# Patient Record
Sex: MALE | Race: WHITE | NOT HISPANIC OR LATINO | Employment: FULL TIME | URBAN - METROPOLITAN AREA
[De-identification: names, ages, dates, MRNs, and addresses within clinical notes are randomized per-mention and may not be internally consistent; named-entity substitution may affect disease eponyms.]

---

## 2017-03-29 ENCOUNTER — GENERIC CONVERSION - ENCOUNTER (OUTPATIENT)
Dept: OTHER | Facility: OTHER | Age: 52
End: 2017-03-29

## 2017-04-08 ENCOUNTER — TRANSCRIBE ORDERS (OUTPATIENT)
Dept: ADMINISTRATIVE | Facility: HOSPITAL | Age: 52
End: 2017-04-08

## 2017-04-08 ENCOUNTER — APPOINTMENT (OUTPATIENT)
Dept: LAB | Facility: MEDICAL CENTER | Age: 52
End: 2017-04-08
Payer: COMMERCIAL

## 2017-04-08 DIAGNOSIS — E78.00 PURE HYPERCHOLESTEROLEMIA: ICD-10-CM

## 2017-04-08 DIAGNOSIS — E03.9 HYPOTHYROIDISM: ICD-10-CM

## 2017-04-08 LAB
ALBUMIN SERPL BCP-MCNC: 3.7 G/DL (ref 3.5–5)
ALP SERPL-CCNC: 72 U/L (ref 46–116)
ALT SERPL W P-5'-P-CCNC: 23 U/L (ref 12–78)
AST SERPL W P-5'-P-CCNC: 13 U/L (ref 5–45)
BILIRUB DIRECT SERPL-MCNC: 0.09 MG/DL (ref 0–0.2)
BILIRUB SERPL-MCNC: 0.41 MG/DL (ref 0.2–1)
CHOLEST SERPL-MCNC: 151 MG/DL (ref 50–200)
CK SERPL-CCNC: 98 U/L (ref 39–308)
HDLC SERPL-MCNC: 47 MG/DL (ref 40–60)
LDLC SERPL CALC-MCNC: 82 MG/DL (ref 0–100)
PROT SERPL-MCNC: 7.1 G/DL (ref 6.4–8.2)
TRIGL SERPL-MCNC: 109 MG/DL
TSH SERPL DL<=0.05 MIU/L-ACNC: 3.84 UIU/ML (ref 0.36–3.74)

## 2017-04-08 PROCEDURE — 84443 ASSAY THYROID STIM HORMONE: CPT

## 2017-04-08 PROCEDURE — 82550 ASSAY OF CK (CPK): CPT

## 2017-04-08 PROCEDURE — 80061 LIPID PANEL: CPT

## 2017-04-08 PROCEDURE — 80076 HEPATIC FUNCTION PANEL: CPT

## 2017-04-08 PROCEDURE — 36415 COLL VENOUS BLD VENIPUNCTURE: CPT

## 2017-04-11 ENCOUNTER — ALLSCRIPTS OFFICE VISIT (OUTPATIENT)
Dept: OTHER | Facility: OTHER | Age: 52
End: 2017-04-11

## 2017-04-11 DIAGNOSIS — E78.00 PURE HYPERCHOLESTEROLEMIA: ICD-10-CM

## 2017-04-11 DIAGNOSIS — E03.9 HYPOTHYROIDISM: ICD-10-CM

## 2017-07-03 ENCOUNTER — APPOINTMENT (EMERGENCY)
Dept: RADIOLOGY | Facility: HOSPITAL | Age: 52
End: 2017-07-03
Payer: COMMERCIAL

## 2017-07-03 ENCOUNTER — GENERIC CONVERSION - ENCOUNTER (OUTPATIENT)
Dept: OTHER | Facility: OTHER | Age: 52
End: 2017-07-03

## 2017-07-03 ENCOUNTER — ANESTHESIA EVENT (OUTPATIENT)
Dept: PERIOP | Facility: HOSPITAL | Age: 52
End: 2017-07-03

## 2017-07-03 ENCOUNTER — HOSPITAL ENCOUNTER (OUTPATIENT)
Facility: HOSPITAL | Age: 52
Setting detail: OBSERVATION
Discharge: HOME | End: 2017-07-04
Attending: EMERGENCY MEDICINE | Admitting: INTERNAL MEDICINE
Payer: COMMERCIAL

## 2017-07-03 DIAGNOSIS — K08.89 DENTALGIA: ICD-10-CM

## 2017-07-03 DIAGNOSIS — K02.9 DENTAL CARIES: ICD-10-CM

## 2017-07-03 DIAGNOSIS — K04.7 DENTAL ABSCESS: ICD-10-CM

## 2017-07-03 DIAGNOSIS — L03.211 FACIAL CELLULITIS: Primary | ICD-10-CM

## 2017-07-03 PROBLEM — J45.909 ASTHMA: Chronic | Status: ACTIVE | Noted: 2017-07-03

## 2017-07-03 PROBLEM — I10 BENIGN ESSENTIAL HTN: Chronic | Status: ACTIVE | Noted: 2017-07-03

## 2017-07-03 PROBLEM — M17.0 PRIMARY OSTEOARTHRITIS OF BOTH KNEES: Chronic | Status: ACTIVE | Noted: 2017-07-03

## 2017-07-03 PROBLEM — E78.5 HLD (HYPERLIPIDEMIA): Chronic | Status: ACTIVE | Noted: 2017-07-03

## 2017-07-03 PROBLEM — E03.9 HYPOTHYROID: Chronic | Status: ACTIVE | Noted: 2017-07-03

## 2017-07-03 LAB
ANION GAP SERPL CALCULATED.3IONS-SCNC: 6 MMOL/L (ref 4–13)
BASOPHILS # BLD AUTO: 0.03 THOUSANDS/ΜL (ref 0–0.1)
BASOPHILS NFR BLD AUTO: 0 % (ref 0–1)
BUN SERPL-MCNC: 10 MG/DL (ref 5–25)
CALCIUM SERPL-MCNC: 8.8 MG/DL (ref 8.3–10.1)
CHLORIDE SERPL-SCNC: 100 MMOL/L (ref 100–108)
CO2 SERPL-SCNC: 32 MMOL/L (ref 21–32)
CREAT SERPL-MCNC: 0.76 MG/DL (ref 0.6–1.3)
EOSINOPHIL # BLD AUTO: 0.29 THOUSAND/ΜL (ref 0–0.61)
EOSINOPHIL NFR BLD AUTO: 3 % (ref 0–6)
ERYTHROCYTE [DISTWIDTH] IN BLOOD BY AUTOMATED COUNT: 15.1 % (ref 11.6–15.1)
GFR SERPL CREATININE-BSD FRML MDRD: >60 ML/MIN/1.73SQ M
GLUCOSE SERPL-MCNC: 116 MG/DL (ref 65–140)
HCT VFR BLD AUTO: 46.3 % (ref 36.5–49.3)
HGB BLD-MCNC: 15.5 G/DL (ref 12–17)
LYMPHOCYTES # BLD AUTO: 1.37 THOUSANDS/ΜL (ref 0.6–4.47)
LYMPHOCYTES NFR BLD AUTO: 14 % (ref 14–44)
MCH RBC QN AUTO: 29 PG (ref 26.8–34.3)
MCHC RBC AUTO-ENTMCNC: 33.5 G/DL (ref 31.4–37.4)
MCV RBC AUTO: 87 FL (ref 82–98)
MONOCYTES # BLD AUTO: 0.89 THOUSAND/ΜL (ref 0.17–1.22)
MONOCYTES NFR BLD AUTO: 9 % (ref 4–12)
NEUTROPHILS # BLD AUTO: 7.35 THOUSANDS/ΜL (ref 1.85–7.62)
NEUTS SEG NFR BLD AUTO: 74 % (ref 43–75)
NRBC BLD AUTO-RTO: 0 /100 WBCS
PLATELET # BLD AUTO: 157 THOUSANDS/UL (ref 149–390)
PMV BLD AUTO: 10.6 FL (ref 8.9–12.7)
POTASSIUM SERPL-SCNC: 3.8 MMOL/L (ref 3.5–5.3)
RBC # BLD AUTO: 5.34 MILLION/UL (ref 3.88–5.62)
SODIUM SERPL-SCNC: 138 MMOL/L (ref 136–145)
WBC # BLD AUTO: 9.96 THOUSAND/UL (ref 4.31–10.16)

## 2017-07-03 PROCEDURE — 94760 N-INVAS EAR/PLS OXIMETRY 1: CPT

## 2017-07-03 PROCEDURE — 36415 COLL VENOUS BLD VENIPUNCTURE: CPT | Performed by: EMERGENCY MEDICINE

## 2017-07-03 PROCEDURE — 94762 N-INVAS EAR/PLS OXIMTRY CONT: CPT

## 2017-07-03 PROCEDURE — 96365 THER/PROPH/DIAG IV INF INIT: CPT

## 2017-07-03 PROCEDURE — 85025 COMPLETE CBC W/AUTO DIFF WBC: CPT | Performed by: EMERGENCY MEDICINE

## 2017-07-03 PROCEDURE — 99285 EMERGENCY DEPT VISIT HI MDM: CPT

## 2017-07-03 PROCEDURE — 70491 CT SOFT TISSUE NECK W/DYE: CPT

## 2017-07-03 PROCEDURE — 80048 BASIC METABOLIC PNL TOTAL CA: CPT | Performed by: EMERGENCY MEDICINE

## 2017-07-03 PROCEDURE — 93005 ELECTROCARDIOGRAM TRACING: CPT | Performed by: STUDENT IN AN ORGANIZED HEALTH CARE EDUCATION/TRAINING PROGRAM

## 2017-07-03 RX ORDER — FLUTICASONE PROPIONATE 220 UG/1
1 AEROSOL, METERED RESPIRATORY (INHALATION) EVERY 12 HOURS SCHEDULED
Status: DISCONTINUED | OUTPATIENT
Start: 2017-07-03 | End: 2017-07-04 | Stop reason: HOSPADM

## 2017-07-03 RX ORDER — HYDROCODONE BITARTRATE AND ACETAMINOPHEN 5; 300 MG/1; MG/1
1 TABLET ORAL EVERY 6 HOURS PRN
COMMUNITY
End: 2018-10-23 | Stop reason: ALTCHOICE

## 2017-07-03 RX ORDER — ATORVASTATIN CALCIUM 20 MG/1
20 TABLET, FILM COATED ORAL DAILY
COMMUNITY
End: 2018-05-10 | Stop reason: SDUPTHER

## 2017-07-03 RX ORDER — SODIUM CHLORIDE 9 MG/ML
125 INJECTION, SOLUTION INTRAVENOUS CONTINUOUS
Status: DISCONTINUED | OUTPATIENT
Start: 2017-07-03 | End: 2017-07-04

## 2017-07-03 RX ORDER — ATORVASTATIN CALCIUM 20 MG/1
20 TABLET, FILM COATED ORAL
Status: DISCONTINUED | OUTPATIENT
Start: 2017-07-03 | End: 2017-07-04 | Stop reason: HOSPADM

## 2017-07-03 RX ORDER — AMOXICILLIN AND CLAVULANATE POTASSIUM 875; 125 MG/1; MG/1
1 TABLET, FILM COATED ORAL EVERY 12 HOURS SCHEDULED
COMMUNITY
End: 2017-07-04 | Stop reason: HOSPADM

## 2017-07-03 RX ORDER — MORPHINE SULFATE 4 MG/ML
4 INJECTION, SOLUTION INTRAMUSCULAR; INTRAVENOUS EVERY 4 HOURS PRN
Status: DISCONTINUED | OUTPATIENT
Start: 2017-07-03 | End: 2017-07-04

## 2017-07-03 RX ORDER — CLINDAMYCIN PHOSPHATE 600 MG/50ML
600 INJECTION INTRAVENOUS EVERY 8 HOURS
Status: DISCONTINUED | OUTPATIENT
Start: 2017-07-03 | End: 2017-07-04 | Stop reason: HOSPADM

## 2017-07-03 RX ORDER — CLINDAMYCIN PHOSPHATE 600 MG/50ML
600 INJECTION INTRAVENOUS ONCE
Status: COMPLETED | OUTPATIENT
Start: 2017-07-03 | End: 2017-07-03

## 2017-07-03 RX ORDER — LOSARTAN POTASSIUM 50 MG/1
50 TABLET ORAL DAILY
COMMUNITY
End: 2018-07-25 | Stop reason: SDUPTHER

## 2017-07-03 RX ORDER — LEVOTHYROXINE SODIUM 0.2 MG/1
225 TABLET ORAL DAILY
COMMUNITY
End: 2018-06-22 | Stop reason: SDUPTHER

## 2017-07-03 RX ORDER — LOSARTAN POTASSIUM 50 MG/1
50 TABLET ORAL DAILY
Status: DISCONTINUED | OUTPATIENT
Start: 2017-07-03 | End: 2017-07-04 | Stop reason: HOSPADM

## 2017-07-03 RX ORDER — ACETAMINOPHEN 325 MG/1
650 TABLET ORAL EVERY 6 HOURS PRN
Status: DISCONTINUED | OUTPATIENT
Start: 2017-07-03 | End: 2017-07-03

## 2017-07-03 RX ORDER — IBUPROFEN 600 MG/1
600 TABLET ORAL EVERY 6 HOURS PRN
Status: DISCONTINUED | OUTPATIENT
Start: 2017-07-03 | End: 2017-07-03

## 2017-07-03 RX ADMIN — DEXAMETHASONE SODIUM PHOSPHATE 10 MG: 10 INJECTION, SOLUTION INTRAMUSCULAR; INTRAVENOUS at 22:00

## 2017-07-03 RX ADMIN — ATORVASTATIN CALCIUM 20 MG: 20 TABLET, FILM COATED ORAL at 15:41

## 2017-07-03 RX ADMIN — CLINDAMYCIN PHOSPHATE 600 MG: 12 INJECTION, SOLUTION INTRAMUSCULAR; INTRAVENOUS at 13:07

## 2017-07-03 RX ADMIN — IOHEXOL 84 ML: 350 INJECTION, SOLUTION INTRAVENOUS at 05:21

## 2017-07-03 RX ADMIN — SODIUM CHLORIDE 125 ML/HR: 0.9 INJECTION, SOLUTION INTRAVENOUS at 17:49

## 2017-07-03 RX ADMIN — LOSARTAN POTASSIUM 50 MG: 50 TABLET, FILM COATED ORAL at 09:39

## 2017-07-03 RX ADMIN — DEXAMETHASONE SODIUM PHOSPHATE 10 MG: 10 INJECTION, SOLUTION INTRAMUSCULAR; INTRAVENOUS at 07:31

## 2017-07-03 RX ADMIN — DEXAMETHASONE SODIUM PHOSPHATE 10 MG: 10 INJECTION, SOLUTION INTRAMUSCULAR; INTRAVENOUS at 15:40

## 2017-07-03 RX ADMIN — MORPHINE SULFATE 4 MG: 4 INJECTION, SOLUTION INTRAMUSCULAR; INTRAVENOUS at 07:37

## 2017-07-03 RX ADMIN — CLINDAMYCIN PHOSPHATE 600 MG: 12 INJECTION, SOLUTION INTRAMUSCULAR; INTRAVENOUS at 19:47

## 2017-07-03 RX ADMIN — SODIUM CHLORIDE 125 ML/HR: 0.9 INJECTION, SOLUTION INTRAVENOUS at 07:33

## 2017-07-03 RX ADMIN — CLINDAMYCIN PHOSPHATE 600 MG: 12 INJECTION, SOLUTION INTRAMUSCULAR; INTRAVENOUS at 05:20

## 2017-07-04 ENCOUNTER — ANESTHESIA (OUTPATIENT)
Dept: PERIOP | Facility: HOSPITAL | Age: 52
End: 2017-07-04

## 2017-07-04 VITALS
HEIGHT: 72 IN | OXYGEN SATURATION: 96 % | BODY MASS INDEX: 37.93 KG/M2 | WEIGHT: 280 LBS | RESPIRATION RATE: 18 BRPM | HEART RATE: 64 BPM | DIASTOLIC BLOOD PRESSURE: 62 MMHG | TEMPERATURE: 97.7 F | SYSTOLIC BLOOD PRESSURE: 129 MMHG

## 2017-07-04 LAB
ANION GAP SERPL CALCULATED.3IONS-SCNC: 3 MMOL/L (ref 4–13)
BUN SERPL-MCNC: 14 MG/DL (ref 5–25)
CALCIUM SERPL-MCNC: 8.8 MG/DL (ref 8.3–10.1)
CHLORIDE SERPL-SCNC: 105 MMOL/L (ref 100–108)
CO2 SERPL-SCNC: 31 MMOL/L (ref 21–32)
CREAT SERPL-MCNC: 0.78 MG/DL (ref 0.6–1.3)
ERYTHROCYTE [DISTWIDTH] IN BLOOD BY AUTOMATED COUNT: 14.9 % (ref 11.6–15.1)
GFR SERPL CREATININE-BSD FRML MDRD: >60 ML/MIN/1.73SQ M
GLUCOSE SERPL-MCNC: 161 MG/DL (ref 65–140)
HCT VFR BLD AUTO: 43.7 % (ref 36.5–49.3)
HGB BLD-MCNC: 14.1 G/DL (ref 12–17)
MCH RBC QN AUTO: 28.2 PG (ref 26.8–34.3)
MCHC RBC AUTO-ENTMCNC: 32.3 G/DL (ref 31.4–37.4)
MCV RBC AUTO: 87 FL (ref 82–98)
PLATELET # BLD AUTO: 151 THOUSANDS/UL (ref 149–390)
PMV BLD AUTO: 10.1 FL (ref 8.9–12.7)
POTASSIUM SERPL-SCNC: 4.4 MMOL/L (ref 3.5–5.3)
RBC # BLD AUTO: 5 MILLION/UL (ref 3.88–5.62)
SODIUM SERPL-SCNC: 139 MMOL/L (ref 136–145)
WBC # BLD AUTO: 13.75 THOUSAND/UL (ref 4.31–10.16)

## 2017-07-04 PROCEDURE — 80048 BASIC METABOLIC PNL TOTAL CA: CPT | Performed by: STUDENT IN AN ORGANIZED HEALTH CARE EDUCATION/TRAINING PROGRAM

## 2017-07-04 PROCEDURE — 85027 COMPLETE CBC AUTOMATED: CPT | Performed by: STUDENT IN AN ORGANIZED HEALTH CARE EDUCATION/TRAINING PROGRAM

## 2017-07-04 RX ORDER — ACETAMINOPHEN 325 MG/1
650 TABLET ORAL EVERY 6 HOURS PRN
Status: DISCONTINUED | OUTPATIENT
Start: 2017-07-04 | End: 2017-07-04 | Stop reason: HOSPADM

## 2017-07-04 RX ORDER — CLINDAMYCIN HYDROCHLORIDE 300 MG/1
300 CAPSULE ORAL 4 TIMES DAILY
Qty: 32 CAPSULE | Refills: 0 | Status: SHIPPED | OUTPATIENT
Start: 2017-07-04 | End: 2017-07-12

## 2017-07-04 RX ADMIN — FLUTICASONE PROPIONATE 1 PUFF: 220 AEROSOL, METERED RESPIRATORY (INHALATION) at 06:39

## 2017-07-04 RX ADMIN — ATORVASTATIN CALCIUM 20 MG: 20 TABLET, FILM COATED ORAL at 16:53

## 2017-07-04 RX ADMIN — LOSARTAN POTASSIUM 50 MG: 50 TABLET, FILM COATED ORAL at 10:55

## 2017-07-04 RX ADMIN — SODIUM CHLORIDE 125 ML/HR: 0.9 INJECTION, SOLUTION INTRAVENOUS at 02:25

## 2017-07-04 RX ADMIN — CLINDAMYCIN PHOSPHATE 600 MG: 12 INJECTION, SOLUTION INTRAMUSCULAR; INTRAVENOUS at 12:30

## 2017-07-04 RX ADMIN — CLINDAMYCIN PHOSPHATE 600 MG: 12 INJECTION, SOLUTION INTRAMUSCULAR; INTRAVENOUS at 19:32

## 2017-07-04 RX ADMIN — LEVOTHYROXINE SODIUM 225 MCG: 125 TABLET ORAL at 06:36

## 2017-07-04 RX ADMIN — CLINDAMYCIN PHOSPHATE 600 MG: 12 INJECTION, SOLUTION INTRAMUSCULAR; INTRAVENOUS at 04:07

## 2017-07-04 RX ADMIN — DEXAMETHASONE SODIUM PHOSPHATE 10 MG: 10 INJECTION, SOLUTION INTRAMUSCULAR; INTRAVENOUS at 04:18

## 2017-07-05 LAB
ATRIAL RATE: 67 BPM
P AXIS: 33 DEGREES
PR INTERVAL: 168 MS
QRS AXIS: 63 DEGREES
QRSD INTERVAL: 92 MS
QT INTERVAL: 390 MS
QTC INTERVAL: 412 MS
T WAVE AXIS: 42 DEGREES
VENTRICULAR RATE: 67 BPM

## 2017-07-19 ENCOUNTER — ALLSCRIPTS OFFICE VISIT (OUTPATIENT)
Dept: OTHER | Facility: OTHER | Age: 52
End: 2017-07-19

## 2017-11-01 ENCOUNTER — APPOINTMENT (OUTPATIENT)
Dept: LAB | Facility: MEDICAL CENTER | Age: 52
End: 2017-11-01
Payer: COMMERCIAL

## 2017-11-01 DIAGNOSIS — E03.9 HYPOTHYROIDISM: ICD-10-CM

## 2017-11-01 DIAGNOSIS — E78.00 PURE HYPERCHOLESTEROLEMIA: ICD-10-CM

## 2017-11-01 LAB
CHOLEST SERPL-MCNC: 169 MG/DL (ref 50–200)
HDLC SERPL-MCNC: 43 MG/DL (ref 40–60)
LDLC SERPL CALC-MCNC: 99 MG/DL (ref 0–100)
TRIGL SERPL-MCNC: 133 MG/DL
TSH SERPL DL<=0.05 MIU/L-ACNC: 8.07 UIU/ML (ref 0.36–3.74)

## 2017-11-01 PROCEDURE — 84443 ASSAY THYROID STIM HORMONE: CPT

## 2017-11-01 PROCEDURE — 36415 COLL VENOUS BLD VENIPUNCTURE: CPT

## 2017-11-01 PROCEDURE — 80061 LIPID PANEL: CPT

## 2017-11-07 ENCOUNTER — GENERIC CONVERSION - ENCOUNTER (OUTPATIENT)
Dept: OTHER | Facility: OTHER | Age: 52
End: 2017-11-07

## 2017-11-07 DIAGNOSIS — I10 ESSENTIAL (PRIMARY) HYPERTENSION: ICD-10-CM

## 2017-11-07 DIAGNOSIS — E03.9 HYPOTHYROIDISM: ICD-10-CM

## 2017-11-07 DIAGNOSIS — E78.00 PURE HYPERCHOLESTEROLEMIA: ICD-10-CM

## 2018-01-13 VITALS
HEART RATE: 65 BPM | HEIGHT: 72 IN | WEIGHT: 289 LBS | SYSTOLIC BLOOD PRESSURE: 130 MMHG | DIASTOLIC BLOOD PRESSURE: 76 MMHG | BODY MASS INDEX: 39.14 KG/M2

## 2018-01-13 VITALS
HEART RATE: 75 BPM | WEIGHT: 284 LBS | DIASTOLIC BLOOD PRESSURE: 74 MMHG | BODY MASS INDEX: 38.47 KG/M2 | OXYGEN SATURATION: 95 % | SYSTOLIC BLOOD PRESSURE: 116 MMHG | HEIGHT: 72 IN | TEMPERATURE: 99.4 F

## 2018-01-22 VITALS
BODY MASS INDEX: 40.01 KG/M2 | HEIGHT: 72 IN | TEMPERATURE: 99.1 F | DIASTOLIC BLOOD PRESSURE: 72 MMHG | WEIGHT: 295.38 LBS | SYSTOLIC BLOOD PRESSURE: 124 MMHG | OXYGEN SATURATION: 96 % | HEART RATE: 73 BPM

## 2018-02-24 ENCOUNTER — APPOINTMENT (OUTPATIENT)
Dept: LAB | Facility: MEDICAL CENTER | Age: 53
End: 2018-02-24
Payer: COMMERCIAL

## 2018-02-24 DIAGNOSIS — E78.00 PURE HYPERCHOLESTEROLEMIA: ICD-10-CM

## 2018-02-24 DIAGNOSIS — I10 ESSENTIAL (PRIMARY) HYPERTENSION: ICD-10-CM

## 2018-02-24 DIAGNOSIS — E03.9 HYPOTHYROIDISM: ICD-10-CM

## 2018-02-24 LAB
ALBUMIN SERPL BCP-MCNC: 3.8 G/DL (ref 3.5–5)
ALP SERPL-CCNC: 69 U/L (ref 46–116)
ALT SERPL W P-5'-P-CCNC: 23 U/L (ref 12–78)
ANION GAP SERPL CALCULATED.3IONS-SCNC: 4 MMOL/L (ref 4–13)
AST SERPL W P-5'-P-CCNC: 16 U/L (ref 5–45)
BASOPHILS # BLD AUTO: 0.04 THOUSANDS/ΜL (ref 0–0.1)
BASOPHILS NFR BLD AUTO: 1 % (ref 0–1)
BILIRUB SERPL-MCNC: 0.47 MG/DL (ref 0.2–1)
BILIRUB UR QL STRIP: NEGATIVE
BUN SERPL-MCNC: 16 MG/DL (ref 5–25)
CALCIUM SERPL-MCNC: 8.9 MG/DL (ref 8.3–10.1)
CHLORIDE SERPL-SCNC: 103 MMOL/L (ref 100–108)
CHOLEST SERPL-MCNC: 135 MG/DL (ref 50–200)
CLARITY UR: CLEAR
CO2 SERPL-SCNC: 31 MMOL/L (ref 21–32)
COLOR UR: YELLOW
CREAT SERPL-MCNC: 0.77 MG/DL (ref 0.6–1.3)
EOSINOPHIL # BLD AUTO: 0.33 THOUSAND/ΜL (ref 0–0.61)
EOSINOPHIL NFR BLD AUTO: 5 % (ref 0–6)
ERYTHROCYTE [DISTWIDTH] IN BLOOD BY AUTOMATED COUNT: 15.6 % (ref 11.6–15.1)
GFR SERPL CREATININE-BSD FRML MDRD: 106 ML/MIN/1.73SQ M
GLUCOSE P FAST SERPL-MCNC: 89 MG/DL (ref 65–99)
GLUCOSE UR STRIP-MCNC: NEGATIVE MG/DL
HCT VFR BLD AUTO: 42.9 % (ref 36.5–49.3)
HDLC SERPL-MCNC: 53 MG/DL (ref 40–60)
HGB BLD-MCNC: 13.6 G/DL (ref 12–17)
HGB UR QL STRIP.AUTO: NEGATIVE
KETONES UR STRIP-MCNC: NEGATIVE MG/DL
LDLC SERPL CALC-MCNC: 68 MG/DL (ref 0–100)
LEUKOCYTE ESTERASE UR QL STRIP: NEGATIVE
LYMPHOCYTES # BLD AUTO: 2.01 THOUSANDS/ΜL (ref 0.6–4.47)
LYMPHOCYTES NFR BLD AUTO: 32 % (ref 14–44)
MCH RBC QN AUTO: 27.9 PG (ref 26.8–34.3)
MCHC RBC AUTO-ENTMCNC: 31.7 G/DL (ref 31.4–37.4)
MCV RBC AUTO: 88 FL (ref 82–98)
MONOCYTES # BLD AUTO: 0.71 THOUSAND/ΜL (ref 0.17–1.22)
MONOCYTES NFR BLD AUTO: 11 % (ref 4–12)
NEUTROPHILS # BLD AUTO: 3.25 THOUSANDS/ΜL (ref 1.85–7.62)
NEUTS SEG NFR BLD AUTO: 51 % (ref 43–75)
NITRITE UR QL STRIP: NEGATIVE
NRBC BLD AUTO-RTO: 0 /100 WBCS
PH UR STRIP.AUTO: 6.5 [PH] (ref 4.5–8)
PLATELET # BLD AUTO: 156 THOUSANDS/UL (ref 149–390)
PMV BLD AUTO: 10.2 FL (ref 8.9–12.7)
POTASSIUM SERPL-SCNC: 4.4 MMOL/L (ref 3.5–5.3)
PROT SERPL-MCNC: 7.2 G/DL (ref 6.4–8.2)
PROT UR STRIP-MCNC: NEGATIVE MG/DL
RBC # BLD AUTO: 4.88 MILLION/UL (ref 3.88–5.62)
SODIUM SERPL-SCNC: 138 MMOL/L (ref 136–145)
SP GR UR STRIP.AUTO: 1.02 (ref 1–1.03)
TRIGL SERPL-MCNC: 68 MG/DL
TSH SERPL DL<=0.05 MIU/L-ACNC: 2.24 UIU/ML (ref 0.36–3.74)
UROBILINOGEN UR QL STRIP.AUTO: 0.2 E.U./DL
WBC # BLD AUTO: 6.35 THOUSAND/UL (ref 4.31–10.16)

## 2018-02-24 PROCEDURE — 80053 COMPREHEN METABOLIC PANEL: CPT

## 2018-02-24 PROCEDURE — 81003 URINALYSIS AUTO W/O SCOPE: CPT

## 2018-02-24 PROCEDURE — 85025 COMPLETE CBC W/AUTO DIFF WBC: CPT

## 2018-02-24 PROCEDURE — 84443 ASSAY THYROID STIM HORMONE: CPT

## 2018-02-24 PROCEDURE — 80061 LIPID PANEL: CPT

## 2018-02-24 PROCEDURE — 36415 COLL VENOUS BLD VENIPUNCTURE: CPT

## 2018-04-04 ENCOUNTER — OFFICE VISIT (OUTPATIENT)
Dept: INTERNAL MEDICINE CLINIC | Age: 53
End: 2018-04-04
Payer: COMMERCIAL

## 2018-04-04 VITALS
TEMPERATURE: 98.2 F | DIASTOLIC BLOOD PRESSURE: 76 MMHG | WEIGHT: 291.4 LBS | SYSTOLIC BLOOD PRESSURE: 122 MMHG | HEIGHT: 71 IN | BODY MASS INDEX: 40.8 KG/M2 | HEART RATE: 84 BPM | OXYGEN SATURATION: 96 %

## 2018-04-04 DIAGNOSIS — E03.9 HYPOTHYROIDISM, UNSPECIFIED TYPE: Primary | Chronic | ICD-10-CM

## 2018-04-04 DIAGNOSIS — I10 BENIGN ESSENTIAL HTN: Chronic | ICD-10-CM

## 2018-04-04 PROCEDURE — 3074F SYST BP LT 130 MM HG: CPT | Performed by: INTERNAL MEDICINE

## 2018-04-04 PROCEDURE — 99214 OFFICE O/P EST MOD 30 MIN: CPT | Performed by: INTERNAL MEDICINE

## 2018-04-04 PROCEDURE — 3008F BODY MASS INDEX DOCD: CPT | Performed by: INTERNAL MEDICINE

## 2018-04-04 PROCEDURE — 3078F DIAST BP <80 MM HG: CPT | Performed by: INTERNAL MEDICINE

## 2018-04-04 RX ORDER — ALBUTEROL SULFATE 90 UG/1
1-2 AEROSOL, METERED RESPIRATORY (INHALATION)
COMMUNITY
Start: 2015-01-12 | End: 2021-01-07 | Stop reason: SDUPTHER

## 2018-04-04 RX ORDER — LEVOTHYROXINE SODIUM 0.05 MG/1
TABLET ORAL
Refills: 0 | COMMUNITY
Start: 2018-03-29 | End: 2018-07-25 | Stop reason: SDUPTHER

## 2018-04-04 NOTE — PROGRESS NOTES
Assessment/Plan:     1  Hypothyroidism   TSH is within normal limits  We will continue levothyroxine at 250 mcg daily  Will check TSH before next visit     2  Hypertension   blood pressure is well controlled with losartan 50 mg daily  Will continue same dose     3  Hyperlipidemia   lipid profile is in excellent range  Continue Lipitor 20 mg daily   also advised about diet exercise and some weight loss     Diagnoses and all orders for this visit:    Hypothyroidism, unspecified type  -     TSH, 3rd generation with T4 reflex; Future    Benign essential HTN    Other orders  -     levothyroxine 50 mcg tablet;   -     albuterol (PROAIR HFA) 90 mcg/act inhaler; Inhale 1-2 puffs          Subjective:          Patient ID: Robert Haines is a 50 y o  male  Hypertension   This is a chronic problem  The current episode started more than 1 year ago  The problem is controlled  Pertinent negatives include no anxiety, chest pain, headaches, malaise/fatigue, neck pain, palpitations, peripheral edema or shortness of breath  There are no associated agents to hypertension  There are no known risk factors for coronary artery disease  Past treatments include angiotensin blockers  There is no history of angina  There is no history of chronic renal disease or a hypertension causing med  Hyperlipidemia   He has no history of chronic renal disease  Pertinent negatives include no chest pain or shortness of breath  Asthma   There is no cough or shortness of breath  Pertinent negatives include no chest pain, ear pain, fever, headaches, malaise/fatigue, postnasal drip, sore throat or trouble swallowing  His past medical history is significant for asthma         The following portions of the patient's history were reviewed and updated as appropriate: allergies, current medications, past family history, past medical history, past social history, past surgical history and problem list     Review of Systems   Constitutional: Negative for fatigue, fever and malaise/fatigue  HENT: Negative for congestion, ear discharge, ear pain, postnasal drip, sinus pressure, sore throat, tinnitus and trouble swallowing  Eyes: Negative for discharge, itching and visual disturbance  Respiratory: Negative for cough and shortness of breath  Cardiovascular: Negative for chest pain and palpitations  Gastrointestinal: Negative for abdominal pain, diarrhea, nausea and vomiting  Endocrine: Negative for cold intolerance and polyuria  Genitourinary: Negative for difficulty urinating, dysuria and urgency  Musculoskeletal: Negative for arthralgias and neck pain  Skin: Negative for rash  Allergic/Immunologic: Negative for environmental allergies  Neurological: Negative for dizziness, weakness and headaches  Psychiatric/Behavioral: Negative  The patient is not nervous/anxious            Past Medical History:   Diagnosis Date    Asthma     Disease of thyroid gland     Hematoma of left axilla     Last Assessed:9/23/2015    Hypertension     Hypothyroidism     Toxic multinodular goiter     Last Assessed:12/26/2013         Current Outpatient Prescriptions:     albuterol (PROAIR HFA) 90 mcg/act inhaler, Inhale 1-2 puffs, Disp: , Rfl:     atorvastatin (LIPITOR) 20 mg tablet, Take 20 mg by mouth daily, Disp: , Rfl:     Fluticasone Propionate, Inhal, (FLOVENT DISKUS) 250 MCG/BLIST AEPB, Inhale 1 puff daily, Disp: , Rfl:     levothyroxine 200 mcg tablet, Take 225 mcg by mouth daily, Disp: , Rfl:     losartan (COZAAR) 50 mg tablet, Take 50 mg by mouth daily, Disp: , Rfl:     HYDROcodone-acetaminophen (XODOL) 5-300 MG per tablet, Take 1 tablet by mouth every 6 (six) hours as needed for moderate pain, Disp: , Rfl:     levothyroxine 50 mcg tablet, , Disp: , Rfl: 0    Allergies   Allergen Reactions    Cat Hair Extract        Social History   Past Surgical History:   Procedure Laterality Date    KNEE ARTHROSCOPY Left      Family History   Problem Relation Age of Onset    Diabetes Mother      mellitus    Hypothyroidism Sister     Cancer Maternal Grandmother        Objective:  /76 (BP Location: Left arm, Patient Position: Sitting, Cuff Size: Standard)   Pulse 84   Temp 98 2 °F (36 8 °C) (Tympanic)   Ht 5' 10 59" (1 793 m)   Wt 132 kg (291 lb 6 4 oz)   SpO2 96%   BMI 41 11 kg/m²   Body mass index is 41 11 kg/m²  Physical Exam   Constitutional: He appears well-developed  HENT:   Head: Normocephalic  Right Ear: External ear normal    Left Ear: External ear normal    Mouth/Throat: Oropharynx is clear and moist    Eyes: Pupils are equal, round, and reactive to light  No scleral icterus  Neck: Normal range of motion  Neck supple  No tracheal deviation present  No thyromegaly present  Cardiovascular: Normal rate, regular rhythm and normal heart sounds  Pulmonary/Chest: Effort normal and breath sounds normal  No respiratory distress  He exhibits no tenderness  Abdominal: Soft  Bowel sounds are normal  He exhibits no mass  There is no tenderness  Musculoskeletal: Normal range of motion  He exhibits edema  Trace bilateral pedal edema noted with superficial varicose veins   Lymphadenopathy:     He has no cervical adenopathy  Neurological: He is alert  No cranial nerve deficit  Skin: Skin is warm  Psychiatric: He has a normal mood and affect   His behavior is normal

## 2018-05-10 DIAGNOSIS — E03.9 HYPOTHYROIDISM, UNSPECIFIED TYPE: Primary | ICD-10-CM

## 2018-05-10 DIAGNOSIS — E78.00 HYPERCHOLESTEROLEMIA: ICD-10-CM

## 2018-05-10 RX ORDER — ATORVASTATIN CALCIUM 20 MG/1
20 TABLET, FILM COATED ORAL DAILY
Qty: 90 TABLET | Refills: 1 | Status: SHIPPED | OUTPATIENT
Start: 2018-05-10 | End: 2019-01-08 | Stop reason: SDUPTHER

## 2018-05-10 RX ORDER — LEVOTHYROXINE SODIUM 0.2 MG/1
200 TABLET ORAL DAILY
Qty: 30 TABLET | Refills: 5 | Status: CANCELLED | OUTPATIENT
Start: 2018-05-10

## 2018-06-22 DIAGNOSIS — J45.909 PERSISTENT ASTHMA WITHOUT COMPLICATION, UNSPECIFIED ASTHMA SEVERITY: ICD-10-CM

## 2018-06-22 DIAGNOSIS — E03.9 HYPOTHYROIDISM, UNSPECIFIED TYPE: Primary | ICD-10-CM

## 2018-06-22 RX ORDER — LEVOTHYROXINE SODIUM 0.2 MG/1
225 TABLET ORAL DAILY
Qty: 30 TABLET | Refills: 3 | Status: SHIPPED | OUTPATIENT
Start: 2018-06-22 | End: 2018-11-27 | Stop reason: SDUPTHER

## 2018-07-25 DIAGNOSIS — E03.9 HYPOTHYROIDISM, UNSPECIFIED TYPE: ICD-10-CM

## 2018-07-25 DIAGNOSIS — I10 ESSENTIAL HYPERTENSION, BENIGN: Primary | ICD-10-CM

## 2018-07-25 RX ORDER — LOSARTAN POTASSIUM 50 MG/1
50 TABLET ORAL DAILY
Qty: 90 TABLET | Refills: 0 | Status: SHIPPED | OUTPATIENT
Start: 2018-07-25 | End: 2018-11-27 | Stop reason: SDUPTHER

## 2018-07-25 RX ORDER — LEVOTHYROXINE SODIUM 0.05 MG/1
50 TABLET ORAL DAILY
Qty: 90 TABLET | Refills: 0 | Status: SHIPPED | OUTPATIENT
Start: 2018-07-25 | End: 2018-11-27 | Stop reason: SDUPTHER

## 2018-07-25 NOTE — TELEPHONE ENCOUNTER
Please ensure pt is taking 250mcg of levothyroxine daily and that he does not need refill of the 200mcg  Thanks  rx sent to pharm

## 2018-07-26 NOTE — TELEPHONE ENCOUNTER
LMOM for pt  To make sure he is still taking Levothyroxine 250 mcg daily and if he needs a refill of 200 mcg also

## 2018-07-26 NOTE — TELEPHONE ENCOUNTER
Patient called back--he is taking 50mcg and 200 mcg of the levothyroxcin  Georgette Ormond He only needed the 50 filled    That is what we filled

## 2018-08-22 ENCOUNTER — APPOINTMENT (OUTPATIENT)
Dept: URGENT CARE | Age: 53
End: 2018-08-22
Payer: OTHER MISCELLANEOUS

## 2018-08-22 PROCEDURE — G0383 LEV 4 HOSP TYPE B ED VISIT: HCPCS

## 2018-08-22 PROCEDURE — 99284 EMERGENCY DEPT VISIT MOD MDM: CPT

## 2018-10-15 ENCOUNTER — APPOINTMENT (OUTPATIENT)
Dept: LAB | Facility: MEDICAL CENTER | Age: 53
End: 2018-10-15
Payer: COMMERCIAL

## 2018-10-15 DIAGNOSIS — E03.9 HYPOTHYROIDISM, UNSPECIFIED TYPE: Chronic | ICD-10-CM

## 2018-10-15 LAB — TSH SERPL DL<=0.05 MIU/L-ACNC: 2.27 UIU/ML (ref 0.36–3.74)

## 2018-10-15 PROCEDURE — 36415 COLL VENOUS BLD VENIPUNCTURE: CPT

## 2018-10-15 PROCEDURE — 84443 ASSAY THYROID STIM HORMONE: CPT

## 2018-10-22 RX ORDER — GATIFLOXACIN 5 MG/ML
SOLUTION/ DROPS OPHTHALMIC
Refills: 1 | COMMUNITY
Start: 2018-09-08 | End: 2018-10-23 | Stop reason: ALTCHOICE

## 2018-10-23 ENCOUNTER — APPOINTMENT (OUTPATIENT)
Dept: URGENT CARE | Age: 53
End: 2018-10-23
Payer: OTHER MISCELLANEOUS

## 2018-10-23 ENCOUNTER — OFFICE VISIT (OUTPATIENT)
Dept: INTERNAL MEDICINE CLINIC | Age: 53
End: 2018-10-23
Payer: COMMERCIAL

## 2018-10-23 VITALS
DIASTOLIC BLOOD PRESSURE: 78 MMHG | HEART RATE: 78 BPM | TEMPERATURE: 97.7 F | OXYGEN SATURATION: 97 % | WEIGHT: 287.8 LBS | SYSTOLIC BLOOD PRESSURE: 126 MMHG | BODY MASS INDEX: 40.61 KG/M2

## 2018-10-23 DIAGNOSIS — J45.20 MILD INTERMITTENT ASTHMA WITHOUT COMPLICATION: Chronic | ICD-10-CM

## 2018-10-23 DIAGNOSIS — Z12.11 COLON CANCER SCREENING: Primary | ICD-10-CM

## 2018-10-23 DIAGNOSIS — E03.9 HYPOTHYROIDISM, UNSPECIFIED TYPE: Chronic | ICD-10-CM

## 2018-10-23 DIAGNOSIS — I10 BENIGN ESSENTIAL HTN: Chronic | ICD-10-CM

## 2018-10-23 PROCEDURE — G0382 LEV 3 HOSP TYPE B ED VISIT: HCPCS | Performed by: PREVENTIVE MEDICINE

## 2018-10-23 PROCEDURE — 3074F SYST BP LT 130 MM HG: CPT | Performed by: INTERNAL MEDICINE

## 2018-10-23 PROCEDURE — 3078F DIAST BP <80 MM HG: CPT | Performed by: INTERNAL MEDICINE

## 2018-10-23 PROCEDURE — 1036F TOBACCO NON-USER: CPT | Performed by: INTERNAL MEDICINE

## 2018-10-23 PROCEDURE — 99214 OFFICE O/P EST MOD 30 MIN: CPT | Performed by: INTERNAL MEDICINE

## 2018-10-23 PROCEDURE — 99283 EMERGENCY DEPT VISIT LOW MDM: CPT | Performed by: PREVENTIVE MEDICINE

## 2018-10-23 NOTE — PROGRESS NOTES
Assessment/Plan:    No problem-specific Assessment & Plan notes found for this encounter  Diagnoses and all orders for this visit:    Colon cancer screening  -     Ambulatory referral to Gastroenterology; Future    Hypothyroidism, unspecified type  Patient is taking 250 mcg of levothyroxine his TSH is normal he does not have any signs or symptoms of hypothyroidism or hyperthyroidism  Mild intermittent asthma without complication   patient has a mild intermittent asthma he does not complain of any asthma symptoms right now but on physical examination his lungs are little tighter and also some expiratory rhonchi I will recommend him to use his albuterol inhaler with least 2 times a day and if it is short of breath or cough then he can use it as needed  Benign essential HTN   attention is very well controlled       he had a laceration of his the finger on the left hand and was recently seen in the [place] [id number] that is the urgent care and the sutures were placed,  He had a tetanus shot 2 months ago so no new tetanus injection was given  Subjective:      Patient ID: Ana Em is a 48 y o  male  HPI   today is a laceration of the left hand  The following portions of the patient's history were reviewed and updated as appropriate: allergies, current medications, past family history, past medical history, past social history, past surgical history and problem list     Review of Systems   Constitutional: Negative for appetite change, fatigue and fever  HENT: Negative for congestion, ear discharge, ear pain, hearing loss, nosebleeds, postnasal drip, sinus pressure, sneezing, sore throat, tinnitus, trouble swallowing and voice change  Eyes: Negative for pain, discharge, redness, itching and visual disturbance  Respiratory: Negative for cough, chest tightness, shortness of breath and wheezing  Cardiovascular: Negative for chest pain, palpitations and leg swelling     Gastrointestinal: Negative for abdominal pain, blood in stool, constipation, diarrhea, nausea and vomiting  Endocrine: Negative for cold intolerance and polyuria  Genitourinary: Negative for difficulty urinating, dysuria, hematuria and urgency  Musculoskeletal: Negative for arthralgias, back pain, gait problem, joint swelling and neck pain  Skin: Negative for rash and wound  Allergic/Immunologic: Negative for environmental allergies  Neurological: Negative for dizziness, tremors, seizures, weakness, light-headedness, numbness and headaches  Hematological: Negative for adenopathy  Does not bruise/bleed easily  Psychiatric/Behavioral: Negative  Negative for behavioral problems and confusion  The patient is not nervous/anxious            Past Medical History:   Diagnosis Date    Asthma     Disease of thyroid gland     Hematoma of left axilla     Last Assessed:9/23/2015    Hypertension     Hypothyroidism     Toxic multinodular goiter     Last Assessed:12/26/2013         Current Outpatient Prescriptions:     albuterol (PROAIR HFA) 90 mcg/act inhaler, Inhale 1-2 puffs, Disp: , Rfl:     atorvastatin (LIPITOR) 20 mg tablet, Take 1 tablet (20 mg total) by mouth daily, Disp: 90 tablet, Rfl: 1    Fluticasone Propionate, Inhal, (FLOVENT DISKUS) 250 MCG/BLIST AEPB, Inhale 1 puff daily, Disp: 60 each, Rfl: 3    levothyroxine 200 mcg tablet, Take 1 tablet (200 mcg total) by mouth daily, Disp: 30 tablet, Rfl: 3    levothyroxine 50 mcg tablet, Take 1 tablet (50 mcg total) by mouth daily, Disp: 90 tablet, Rfl: 0    losartan (COZAAR) 50 mg tablet, Take 1 tablet (50 mg total) by mouth daily, Disp: 90 tablet, Rfl: 0    Allergies   Allergen Reactions    Cat Hair Extract        Social History   Past Surgical History:   Procedure Laterality Date    KNEE ARTHROSCOPY Left      Family History   Problem Relation Age of Onset    Diabetes Mother         mellitus    Hypothyroidism Sister     Cancer Maternal Grandmother        Objective:  BP 126/78 (BP Location: Left arm, Patient Position: Sitting, Cuff Size: Large)   Pulse 78   Temp 97 7 °F (36 5 °C) (Tympanic)   Wt 131 kg (287 lb 12 8 oz) Comment: with shoes  SpO2 97%   BMI 40 61 kg/m²        Physical Exam   Constitutional: He is oriented to person, place, and time  He appears well-developed and well-nourished  HENT:   Right Ear: External ear normal    Mouth/Throat: Oropharynx is clear and moist    Eyes: Pupils are equal, round, and reactive to light  Conjunctivae and EOM are normal    Neck: Normal range of motion  No JVD present  No thyromegaly present  Cardiovascular: Normal rate, regular rhythm, normal heart sounds and intact distal pulses  Pulmonary/Chest:   Decreased breath sounds bilaterally and some expiratory rhonchi   Abdominal: Soft  Bowel sounds are normal    Musculoskeletal: Normal range of motion  Lymphadenopathy:     He has no cervical adenopathy  Neurological: He is alert and oriented to person, place, and time  He has normal reflexes  Skin: Skin is dry  Psychiatric: He has a normal mood and affect  His behavior is normal  Judgment and thought content normal    Nursing note and vitals reviewed          Recent Results (from the past 672 hour(s))   TSH, 3rd generation with T4 reflex    Collection Time: 10/15/18  7:58 AM   Result Value Ref Range    TSH 3RD GENERATON 2 270 0 358 - 3 740 uIU/mL

## 2018-11-01 ENCOUNTER — APPOINTMENT (OUTPATIENT)
Dept: URGENT CARE | Age: 53
End: 2018-11-01
Payer: OTHER MISCELLANEOUS

## 2018-11-01 PROCEDURE — 99213 OFFICE O/P EST LOW 20 MIN: CPT | Performed by: PREVENTIVE MEDICINE

## 2018-11-27 DIAGNOSIS — I10 ESSENTIAL HYPERTENSION, BENIGN: ICD-10-CM

## 2018-11-27 DIAGNOSIS — E03.9 HYPOTHYROIDISM, UNSPECIFIED TYPE: ICD-10-CM

## 2018-11-27 RX ORDER — LEVOTHYROXINE SODIUM 0.05 MG/1
50 TABLET ORAL DAILY
Qty: 90 TABLET | Refills: 1 | Status: SHIPPED | OUTPATIENT
Start: 2018-11-27 | End: 2019-09-18 | Stop reason: SDUPTHER

## 2018-11-27 RX ORDER — LOSARTAN POTASSIUM 50 MG/1
50 TABLET ORAL DAILY
Qty: 90 TABLET | Refills: 1 | Status: SHIPPED | OUTPATIENT
Start: 2018-11-27 | End: 2019-09-18 | Stop reason: SDUPTHER

## 2018-11-27 RX ORDER — LEVOTHYROXINE SODIUM 0.2 MG/1
200 TABLET ORAL DAILY
Qty: 90 TABLET | Refills: 1 | Status: SHIPPED | OUTPATIENT
Start: 2018-11-27 | End: 2019-09-18 | Stop reason: SDUPTHER

## 2019-01-08 DIAGNOSIS — E78.00 HYPERCHOLESTEROLEMIA: ICD-10-CM

## 2019-01-08 RX ORDER — ATORVASTATIN CALCIUM 20 MG/1
20 TABLET, FILM COATED ORAL DAILY
Qty: 90 TABLET | Refills: 1 | Status: SHIPPED | OUTPATIENT
Start: 2019-01-08 | End: 2019-11-07 | Stop reason: SDUPTHER

## 2019-02-23 ENCOUNTER — TRANSCRIBE ORDERS (OUTPATIENT)
Dept: ADMINISTRATIVE | Facility: HOSPITAL | Age: 54
End: 2019-02-23

## 2019-02-23 ENCOUNTER — APPOINTMENT (OUTPATIENT)
Dept: LAB | Facility: MEDICAL CENTER | Age: 54
End: 2019-02-23
Payer: COMMERCIAL

## 2019-02-23 DIAGNOSIS — E03.9 HYPOTHYROIDISM, UNSPECIFIED TYPE: ICD-10-CM

## 2019-02-23 DIAGNOSIS — I10 BENIGN ESSENTIAL HTN: ICD-10-CM

## 2019-02-23 DIAGNOSIS — I10 ESSENTIAL HYPERTENSION, BENIGN: Primary | ICD-10-CM

## 2019-02-23 LAB
ALBUMIN SERPL BCP-MCNC: 3.9 G/DL (ref 3.5–5)
ALP SERPL-CCNC: 67 U/L (ref 46–116)
ALT SERPL W P-5'-P-CCNC: 22 U/L (ref 12–78)
ANION GAP SERPL CALCULATED.3IONS-SCNC: 3 MMOL/L (ref 4–13)
AST SERPL W P-5'-P-CCNC: 14 U/L (ref 5–45)
BASOPHILS # BLD AUTO: 0.06 THOUSANDS/ΜL (ref 0–0.1)
BASOPHILS NFR BLD AUTO: 1 % (ref 0–1)
BILIRUB SERPL-MCNC: 0.38 MG/DL (ref 0.2–1)
BUN SERPL-MCNC: 16 MG/DL (ref 5–25)
CALCIUM SERPL-MCNC: 8.7 MG/DL (ref 8.3–10.1)
CHLORIDE SERPL-SCNC: 106 MMOL/L (ref 100–108)
CHOLEST SERPL-MCNC: 155 MG/DL (ref 50–200)
CO2 SERPL-SCNC: 32 MMOL/L (ref 21–32)
CREAT SERPL-MCNC: 0.76 MG/DL (ref 0.6–1.3)
EOSINOPHIL # BLD AUTO: 0.31 THOUSAND/ΜL (ref 0–0.61)
EOSINOPHIL NFR BLD AUTO: 5 % (ref 0–6)
ERYTHROCYTE [DISTWIDTH] IN BLOOD BY AUTOMATED COUNT: 15.8 % (ref 11.6–15.1)
GFR SERPL CREATININE-BSD FRML MDRD: 106 ML/MIN/1.73SQ M
GLUCOSE P FAST SERPL-MCNC: 92 MG/DL (ref 65–99)
HCT VFR BLD AUTO: 45.8 % (ref 36.5–49.3)
HDLC SERPL-MCNC: 50 MG/DL (ref 40–60)
HGB BLD-MCNC: 14 G/DL (ref 12–17)
IMM GRANULOCYTES # BLD AUTO: 0.01 THOUSAND/UL (ref 0–0.2)
IMM GRANULOCYTES NFR BLD AUTO: 0 % (ref 0–2)
LDLC SERPL CALC-MCNC: 86 MG/DL (ref 0–100)
LYMPHOCYTES # BLD AUTO: 1.83 THOUSANDS/ΜL (ref 0.6–4.47)
LYMPHOCYTES NFR BLD AUTO: 30 % (ref 14–44)
MCH RBC QN AUTO: 28.4 PG (ref 26.8–34.3)
MCHC RBC AUTO-ENTMCNC: 30.6 G/DL (ref 31.4–37.4)
MCV RBC AUTO: 93 FL (ref 82–98)
MONOCYTES # BLD AUTO: 0.48 THOUSAND/ΜL (ref 0.17–1.22)
MONOCYTES NFR BLD AUTO: 8 % (ref 4–12)
NEUTROPHILS # BLD AUTO: 3.4 THOUSANDS/ΜL (ref 1.85–7.62)
NEUTS SEG NFR BLD AUTO: 56 % (ref 43–75)
NONHDLC SERPL-MCNC: 105 MG/DL
NRBC BLD AUTO-RTO: 0 /100 WBCS
PLATELET # BLD AUTO: 165 THOUSANDS/UL (ref 149–390)
PMV BLD AUTO: 10.6 FL (ref 8.9–12.7)
POTASSIUM SERPL-SCNC: 4.3 MMOL/L (ref 3.5–5.3)
PROT SERPL-MCNC: 7.2 G/DL (ref 6.4–8.2)
RBC # BLD AUTO: 4.93 MILLION/UL (ref 3.88–5.62)
SODIUM SERPL-SCNC: 141 MMOL/L (ref 136–145)
T4 FREE SERPL-MCNC: 0.92 NG/DL (ref 0.76–1.46)
TRIGL SERPL-MCNC: 97 MG/DL
TSH SERPL DL<=0.05 MIU/L-ACNC: 4.37 UIU/ML (ref 0.36–3.74)
WBC # BLD AUTO: 6.09 THOUSAND/UL (ref 4.31–10.16)

## 2019-02-23 PROCEDURE — 84443 ASSAY THYROID STIM HORMONE: CPT

## 2019-02-23 PROCEDURE — 80061 LIPID PANEL: CPT | Performed by: INTERNAL MEDICINE

## 2019-02-23 PROCEDURE — 84439 ASSAY OF FREE THYROXINE: CPT

## 2019-02-23 PROCEDURE — 85025 COMPLETE CBC W/AUTO DIFF WBC: CPT | Performed by: INTERNAL MEDICINE

## 2019-02-23 PROCEDURE — 36415 COLL VENOUS BLD VENIPUNCTURE: CPT | Performed by: INTERNAL MEDICINE

## 2019-02-23 PROCEDURE — 80053 COMPREHEN METABOLIC PANEL: CPT | Performed by: INTERNAL MEDICINE

## 2019-04-01 ENCOUNTER — OFFICE VISIT (OUTPATIENT)
Dept: INTERNAL MEDICINE CLINIC | Facility: CLINIC | Age: 54
End: 2019-04-01
Payer: COMMERCIAL

## 2019-04-01 VITALS
DIASTOLIC BLOOD PRESSURE: 92 MMHG | OXYGEN SATURATION: 97 % | SYSTOLIC BLOOD PRESSURE: 162 MMHG | TEMPERATURE: 99.1 F | WEIGHT: 294 LBS | BODY MASS INDEX: 41.48 KG/M2 | HEART RATE: 77 BPM

## 2019-04-01 DIAGNOSIS — Z12.11 COLON CANCER SCREENING: Primary | ICD-10-CM

## 2019-04-01 DIAGNOSIS — I10 BENIGN ESSENTIAL HTN: Chronic | ICD-10-CM

## 2019-04-01 DIAGNOSIS — J45.20 MILD INTERMITTENT ASTHMA WITHOUT COMPLICATION: Chronic | ICD-10-CM

## 2019-04-01 DIAGNOSIS — E78.01 FAMILIAL HYPERCHOLESTEROLEMIA: Chronic | ICD-10-CM

## 2019-04-01 DIAGNOSIS — E03.9 HYPOTHYROIDISM, UNSPECIFIED TYPE: Chronic | ICD-10-CM

## 2019-04-01 PROBLEM — L03.211 FACIAL CELLULITIS: Status: RESOLVED | Noted: 2017-07-03 | Resolved: 2019-04-01

## 2019-04-01 PROCEDURE — 1036F TOBACCO NON-USER: CPT | Performed by: INTERNAL MEDICINE

## 2019-04-01 PROCEDURE — 99214 OFFICE O/P EST MOD 30 MIN: CPT | Performed by: INTERNAL MEDICINE

## 2019-05-08 ENCOUNTER — CONSULT (OUTPATIENT)
Dept: BARIATRICS | Facility: CLINIC | Age: 54
End: 2019-05-08
Payer: COMMERCIAL

## 2019-05-08 VITALS
TEMPERATURE: 99.1 F | RESPIRATION RATE: 16 BRPM | HEIGHT: 71 IN | WEIGHT: 285.5 LBS | DIASTOLIC BLOOD PRESSURE: 70 MMHG | BODY MASS INDEX: 39.97 KG/M2 | HEART RATE: 78 BPM | SYSTOLIC BLOOD PRESSURE: 132 MMHG

## 2019-05-08 DIAGNOSIS — E78.01 FAMILIAL HYPERCHOLESTEROLEMIA: Chronic | ICD-10-CM

## 2019-05-08 DIAGNOSIS — I10 BENIGN ESSENTIAL HTN: Chronic | ICD-10-CM

## 2019-05-08 DIAGNOSIS — E03.9 HYPOTHYROIDISM, UNSPECIFIED TYPE: Chronic | ICD-10-CM

## 2019-05-08 DIAGNOSIS — E66.01 OBESITY, CLASS III, BMI 40-49.9 (MORBID OBESITY) (HCC): Primary | ICD-10-CM

## 2019-05-08 PROBLEM — E66.813 OBESITY, CLASS III, BMI 40-49.9 (MORBID OBESITY): Status: ACTIVE | Noted: 2019-05-08

## 2019-05-08 PROCEDURE — 99244 OFF/OP CNSLTJ NEW/EST MOD 40: CPT | Performed by: PHYSICIAN ASSISTANT

## 2019-06-12 ENCOUNTER — TELEPHONE (OUTPATIENT)
Dept: BARIATRICS | Facility: CLINIC | Age: 54
End: 2019-06-12

## 2019-09-18 DIAGNOSIS — E03.9 HYPOTHYROIDISM, UNSPECIFIED TYPE: ICD-10-CM

## 2019-09-18 DIAGNOSIS — I10 ESSENTIAL HYPERTENSION, BENIGN: ICD-10-CM

## 2019-09-18 NOTE — TELEPHONE ENCOUNTER
Pt is requesting refill on Levothyroxine 200mcg and 50mcg, losartan 50mg      Last OV:04/01/19  Future OV:10/01/19

## 2019-09-19 RX ORDER — LEVOTHYROXINE SODIUM 0.05 MG/1
50 TABLET ORAL DAILY
Qty: 90 TABLET | Refills: 1 | Status: SHIPPED | OUTPATIENT
Start: 2019-09-19 | End: 2020-06-16 | Stop reason: SDUPTHER

## 2019-09-19 RX ORDER — LEVOTHYROXINE SODIUM 0.2 MG/1
200 TABLET ORAL DAILY
Qty: 90 TABLET | Refills: 1 | Status: SHIPPED | OUTPATIENT
Start: 2019-09-19 | End: 2020-06-16 | Stop reason: SDUPTHER

## 2019-09-19 RX ORDER — LOSARTAN POTASSIUM 50 MG/1
50 TABLET ORAL DAILY
Qty: 90 TABLET | Refills: 1 | Status: SHIPPED | OUTPATIENT
Start: 2019-09-19 | End: 2020-06-16 | Stop reason: SDUPTHER

## 2019-09-21 ENCOUNTER — APPOINTMENT (OUTPATIENT)
Dept: LAB | Facility: MEDICAL CENTER | Age: 54
End: 2019-09-21
Payer: COMMERCIAL

## 2019-09-21 DIAGNOSIS — E03.9 HYPOTHYROIDISM, UNSPECIFIED TYPE: ICD-10-CM

## 2019-09-21 LAB
T4 FREE SERPL-MCNC: 0.68 NG/DL (ref 0.76–1.46)
TSH SERPL DL<=0.05 MIU/L-ACNC: 12 UIU/ML (ref 0.36–3.74)

## 2019-09-21 PROCEDURE — 84439 ASSAY OF FREE THYROXINE: CPT

## 2019-09-21 PROCEDURE — 36415 COLL VENOUS BLD VENIPUNCTURE: CPT

## 2019-09-21 PROCEDURE — 84443 ASSAY THYROID STIM HORMONE: CPT

## 2019-10-01 ENCOUNTER — OFFICE VISIT (OUTPATIENT)
Dept: INTERNAL MEDICINE CLINIC | Age: 54
End: 2019-10-01
Payer: COMMERCIAL

## 2019-10-01 VITALS
DIASTOLIC BLOOD PRESSURE: 72 MMHG | OXYGEN SATURATION: 96 % | BODY MASS INDEX: 39.06 KG/M2 | HEART RATE: 72 BPM | HEIGHT: 72 IN | SYSTOLIC BLOOD PRESSURE: 128 MMHG | TEMPERATURE: 97.8 F | WEIGHT: 288.4 LBS

## 2019-10-01 DIAGNOSIS — E66.01 OBESITY, CLASS III, BMI 40-49.9 (MORBID OBESITY) (HCC): ICD-10-CM

## 2019-10-01 DIAGNOSIS — M17.0 PRIMARY OSTEOARTHRITIS OF BOTH KNEES: Chronic | ICD-10-CM

## 2019-10-01 DIAGNOSIS — I10 BENIGN ESSENTIAL HTN: Chronic | ICD-10-CM

## 2019-10-01 DIAGNOSIS — E03.9 HYPOTHYROIDISM, UNSPECIFIED TYPE: Primary | Chronic | ICD-10-CM

## 2019-10-01 PROCEDURE — 3008F BODY MASS INDEX DOCD: CPT | Performed by: INTERNAL MEDICINE

## 2019-10-01 PROCEDURE — 3074F SYST BP LT 130 MM HG: CPT | Performed by: INTERNAL MEDICINE

## 2019-10-01 PROCEDURE — 99214 OFFICE O/P EST MOD 30 MIN: CPT | Performed by: INTERNAL MEDICINE

## 2019-10-01 PROCEDURE — 3078F DIAST BP <80 MM HG: CPT | Performed by: INTERNAL MEDICINE

## 2019-10-01 NOTE — PROGRESS NOTES
Assessment/Plan:  BMI Counseling: Body mass index is 39 49 kg/m²  The BMI is above normal  Nutrition recommendations include reducing portion sizes, decreasing overall calorie intake, 3-5 servings of fruits/vegetables daily, reducing fast food intake, consuming healthier snacks, decreasing soda and/or juice intake and moderation in carbohydrate intake  Exercise recommendations include exercising 3-5 times per week  Hypothyroid  TSH was 4 before now it is 12 according to the patient he is under lot of stress and was missing his doses he was very much compliant before and his TSH was always within normal range he does not have any symptoms of hypothyroidism but I recommend to continue to use his thyroid medication as it is because I do not want to increase his does when he is saying that he is not compliant I will check his TSH in 6 week and we will go from there I will recommend him to take his medications regularly 1st thing in the morning any fecal forget to take it in the morning then he can take it any time but at least he should take it, family if he forget to take it in the morning he should take it 2 hours after any kind of meal or any food       Diagnoses and all orders for this visit:    Hypothyroidism, unspecified type  -     TSH, 3rd generation with Free T4 reflex; Future    Benign essential HTN    Primary osteoarthritis of both knees    Obesity, Class III, BMI 40-49 9 (morbid obesity) ([facility] [address] )  -     Lipid panel; Future  -     Hemoglobin A1C; Future  -     Glucose, fasting; Future        Subjective:   Chief Complaint   Patient presents with    Follow-up     hypothyroidism- labs 9/21/19- pt states no meds needed today      Patient ID: Nahun Gonzalez is a 48 y o  male      HPI  This is a very pleasant 48 years young gentleman who is under lot of stress recently because of the family issues came for his regular follow-up visit for hypothyroidism hypertension morbid obesity he is doing well symptomatic, but his TSH is about 12 he is accepting that he is missing some of his the Synthroid doses I will repeat the TSH in about 6 week and will go from there I do not think so I will increase the dose he is already on 250 mcg of Synthroid, I will follow him up with the lipid panel and also TSH  Attention is controlled weight is stable but not going down I recommend diet and exercise he went to the weight management program but was not happy with them they were pushing into the meal plans, which he cannot of 4 or he cannot take care of it because he is traveling to work all the time  The following portions of the patient's history were reviewed and updated as appropriate: allergies, current medications, past family history, past medical history, past social history, past surgical history and problem list     Review of Systems   Constitutional: Positive for fatigue  Negative for activity change and fever  HENT: Negative for congestion, sinus pain and sore throat  Eyes: Negative for discharge  Respiratory: Negative for cough and shortness of breath  Cardiovascular: Negative for chest pain and palpitations  Gastrointestinal: Negative for constipation, diarrhea and nausea  Genitourinary: Negative for hematuria and urgency  Musculoskeletal: Negative for back pain and gait problem  Neurological: Negative for dizziness, weakness and light-headedness  Psychiatric/Behavioral: Negative for sleep disturbance  The patient is not nervous/anxious            Past Medical History:   Diagnosis Date    Asthma     Disease of thyroid gland     Hematoma of left axilla     Last Assessed:9/23/2015    Hypertension     Hyperthyroidism     Hypothyroidism     Toxic multinodular goiter     Last Assessed:12/26/2013         Current Outpatient Medications:     albuterol (PROAIR HFA) 90 mcg/act inhaler, Inhale 1-2 puffs, Disp: , Rfl:     atorvastatin (LIPITOR) 20 mg tablet, Take 1 tablet (20 mg total) by mouth daily, Disp: 90 tablet, Rfl: 1    Fluticasone Propionate, Inhal, (FLOVENT DISKUS) 250 MCG/BLIST AEPB, Inhale 1 puff daily, Disp: 60 each, Rfl: 3    levothyroxine 200 mcg tablet, Take 1 tablet (200 mcg total) by mouth daily, Disp: 90 tablet, Rfl: 1    levothyroxine 50 mcg tablet, Take 1 tablet (50 mcg total) by mouth daily, Disp: 90 tablet, Rfl: 1    losartan (COZAAR) 50 mg tablet, Take 1 tablet (50 mg total) by mouth daily, Disp: 90 tablet, Rfl: 1    Allergies   Allergen Reactions    Cat Hair Extract      Eye irritation       Social History   Past Surgical History:   Procedure Laterality Date    KNEE ARTHROSCOPY Left     THYROID SURGERY      Ablation     Family History   Problem Relation Age of Onset    Diabetes Mother         mellitus    Hypothyroidism Sister     Cancer Maternal Grandmother     Parkinsonism Father     Heart disease Neg Hx     Stroke Neg Hx        Objective:  /72 (BP Location: Left arm, Patient Position: Sitting, Cuff Size: Standard)   Pulse 72   Temp 97 8 °F (36 6 °C) (Tympanic)   Ht 5' 11 65" (1 82 m) Comment: shoes on  Wt 131 kg (288 lb 6 4 oz) Comment: shoes on  SpO2 96%   BMI 39 49 kg/m²        Physical Exam   Constitutional: He is oriented to person, place, and time  He appears well-developed and well-nourished  HENT:   Right Ear: External ear normal    Mouth/Throat: Oropharynx is clear and moist    Eyes: Pupils are equal, round, and reactive to light  Conjunctivae and EOM are normal    Neck: Normal range of motion  No JVD present  No thyromegaly present  Cardiovascular: Normal rate, regular rhythm, normal heart sounds and intact distal pulses  Pulmonary/Chest: Breath sounds normal    Abdominal: Soft  Bowel sounds are normal    Musculoskeletal: Normal range of motion  Lymphadenopathy:     He has no cervical adenopathy  Neurological: He is alert and oriented to person, place, and time  He has normal reflexes  Skin: Skin is dry     Psychiatric: He has a normal mood and affect   His behavior is normal  Judgment and thought content normal          Recent Results (from the past 672 hour(s))   TSH, 3rd generation with Free T4 reflex    Collection Time: 09/21/19  7:52 AM   Result Value Ref Range    TSH 3RD GENERATON 12 000 (H) 0 358 - 3 740 uIU/mL   T4, free    Collection Time: 09/21/19  7:52 AM   Result Value Ref Range    Free T4 0 68 (L) 0 76 - 1 46 ng/dL

## 2019-10-01 NOTE — ASSESSMENT & PLAN NOTE
Hypertension is controlled weight is very able he is not able to lose any weight this could be because of his hypothyroidism

## 2019-10-01 NOTE — ASSESSMENT & PLAN NOTE
TSH was 4 before now it is 12 according to the patient he is under lot of stress and was missing his doses he was very much compliant before and his TSH was always within normal range he does not have any symptoms of hypothyroidism but I recommend to continue to use his thyroid medication as it is because I do not want to increase his does when he is saying that he is not compliant I will check his TSH in 6 week and we will go from there I will recommend him to take his medications regularly 1st thing in the morning any fecal forget to take it in the morning then he can take it any time but at least he should take it, family if he forget to take it in the morning he should take it 2 hours after any kind of meal or any food

## 2019-10-25 DIAGNOSIS — J45.909 PERSISTENT ASTHMA WITHOUT COMPLICATION, UNSPECIFIED ASTHMA SEVERITY: ICD-10-CM

## 2019-11-07 DIAGNOSIS — E78.00 HYPERCHOLESTEROLEMIA: ICD-10-CM

## 2019-11-07 RX ORDER — ATORVASTATIN CALCIUM 20 MG/1
20 TABLET, FILM COATED ORAL DAILY
Qty: 90 TABLET | Refills: 1 | Status: SHIPPED | OUTPATIENT
Start: 2019-11-07 | End: 2020-06-16 | Stop reason: SDUPTHER

## 2019-12-14 ENCOUNTER — APPOINTMENT (OUTPATIENT)
Dept: LAB | Facility: MEDICAL CENTER | Age: 54
End: 2019-12-14
Payer: COMMERCIAL

## 2019-12-14 DIAGNOSIS — E66.01 OBESITY, CLASS III, BMI 40-49.9 (MORBID OBESITY) (HCC): ICD-10-CM

## 2019-12-14 DIAGNOSIS — E03.9 HYPOTHYROIDISM, UNSPECIFIED TYPE: ICD-10-CM

## 2019-12-14 LAB
CHOLEST SERPL-MCNC: 164 MG/DL (ref 50–200)
EST. AVERAGE GLUCOSE BLD GHB EST-MCNC: 120 MG/DL
GLUCOSE P FAST SERPL-MCNC: 101 MG/DL (ref 65–99)
HBA1C MFR BLD: 5.8 % (ref 4.2–6.3)
HDLC SERPL-MCNC: 44 MG/DL
LDLC SERPL CALC-MCNC: 99 MG/DL (ref 0–100)
NONHDLC SERPL-MCNC: 120 MG/DL
T4 FREE SERPL-MCNC: 0.96 NG/DL (ref 0.76–1.46)
TRIGL SERPL-MCNC: 105 MG/DL
TSH SERPL DL<=0.05 MIU/L-ACNC: 6.14 UIU/ML (ref 0.36–3.74)

## 2019-12-14 PROCEDURE — 84439 ASSAY OF FREE THYROXINE: CPT

## 2019-12-14 PROCEDURE — 36415 COLL VENOUS BLD VENIPUNCTURE: CPT

## 2019-12-14 PROCEDURE — 82947 ASSAY GLUCOSE BLOOD QUANT: CPT

## 2019-12-14 PROCEDURE — 84443 ASSAY THYROID STIM HORMONE: CPT

## 2019-12-14 PROCEDURE — 80061 LIPID PANEL: CPT

## 2019-12-14 PROCEDURE — 83036 HEMOGLOBIN GLYCOSYLATED A1C: CPT

## 2020-01-07 ENCOUNTER — OFFICE VISIT (OUTPATIENT)
Dept: INTERNAL MEDICINE CLINIC | Age: 55
End: 2020-01-07
Payer: COMMERCIAL

## 2020-01-07 VITALS
SYSTOLIC BLOOD PRESSURE: 122 MMHG | DIASTOLIC BLOOD PRESSURE: 76 MMHG | HEART RATE: 75 BPM | WEIGHT: 288.5 LBS | OXYGEN SATURATION: 98 % | BODY MASS INDEX: 39.08 KG/M2 | HEIGHT: 72 IN | TEMPERATURE: 98.5 F

## 2020-01-07 DIAGNOSIS — E66.01 OBESITY, CLASS III, BMI 40-49.9 (MORBID OBESITY) (HCC): Primary | ICD-10-CM

## 2020-01-07 DIAGNOSIS — J45.20 MILD INTERMITTENT ASTHMA WITHOUT COMPLICATION: Chronic | ICD-10-CM

## 2020-01-07 DIAGNOSIS — I10 BENIGN ESSENTIAL HTN: Chronic | ICD-10-CM

## 2020-01-07 DIAGNOSIS — Z11.4 ENCOUNTER FOR SCREENING FOR HIV: ICD-10-CM

## 2020-01-07 DIAGNOSIS — E03.9 HYPOTHYROIDISM, UNSPECIFIED TYPE: Chronic | ICD-10-CM

## 2020-01-07 PROCEDURE — 99213 OFFICE O/P EST LOW 20 MIN: CPT | Performed by: INTERNAL MEDICINE

## 2020-01-07 PROCEDURE — 3074F SYST BP LT 130 MM HG: CPT | Performed by: INTERNAL MEDICINE

## 2020-01-07 PROCEDURE — 1036F TOBACCO NON-USER: CPT | Performed by: INTERNAL MEDICINE

## 2020-01-07 PROCEDURE — 3008F BODY MASS INDEX DOCD: CPT | Performed by: INTERNAL MEDICINE

## 2020-01-07 PROCEDURE — 3078F DIAST BP <80 MM HG: CPT | Performed by: INTERNAL MEDICINE

## 2020-01-07 NOTE — PROGRESS NOTES
Assessment/Plan:     Diagnoses and all orders for this visit:    Obesity, Class III, BMI 40-49 9 (morbid obesity) (HCC)    Benign essential HTN    Mild intermittent asthma without complication    Hypothyroidism, unspecified type  -     TSH, 3rd generation with Free T4 reflex; Future    Encounter for screening for HIV  -     HIV 1/2 AG-AB combo; Future        BMI Counseling: Body mass index is 39 51 kg/m²  The BMI is above normal  Nutrition recommendations include decreasing portion sizes, encouraging healthy choices of fruits and vegetables and moderation in carbohydrate intake  Exercise recommendations include moderate physical activity 150 minutes/week  Subjective:   Chief Complaint   Patient presents with    Follow-up     Hypothyroidism-Labs results        Patient ID: Pradeep Avalso is a 48 y o  male  HPI  This is 48 years young gentleman who is here for the follow-up last time he had problem with the compliance of his medications and also his TSH was high this time the TSH is much better his lipid panel is excellent weight is still a problem he need to lose weight  Blood pressure is very well controlled  Continued to have the problem with dry skin  Mostly in his hands  Hypercholesterolemia he is on atorvastatin for that and he is doing well his lipid panel is excellent and the blood workup shows no problems with the lip liver enzymes elevation, asthma control is good  The following portions of the patient's history were reviewed and updated as appropriate: allergies, current medications, past family history, past medical history, past social history, past surgical history and problem list     Review of Systems   Constitutional: Negative for appetite change, fatigue and fever  HENT: Negative for congestion, ear pain, hearing loss, nosebleeds, sneezing, tinnitus and voice change  Eyes: Negative for pain, discharge and redness  Respiratory: Negative for cough, chest tightness and wheezing  Cardiovascular: Negative for chest pain, palpitations and leg swelling  Gastrointestinal: Negative for abdominal pain, blood in stool, constipation, diarrhea, nausea and vomiting  Genitourinary: Negative for difficulty urinating, dysuria, hematuria and urgency  Musculoskeletal: Negative for arthralgias, back pain, gait problem and joint swelling  Skin: Negative for rash and wound  Allergic/Immunologic: Negative for environmental allergies  Neurological: Negative for dizziness, tremors, seizures, weakness, light-headedness and numbness  Hematological: Negative for adenopathy  Does not bruise/bleed easily  Psychiatric/Behavioral: Negative for behavioral problems and confusion  The patient is not nervous/anxious            Past Medical History:   Diagnosis Date    Asthma     Disease of thyroid gland     Hematoma of left axilla     Last Assessed:9/23/2015    Hypertension     Hyperthyroidism     Hypothyroidism     Toxic multinodular goiter     Last Assessed:12/26/2013         Current Outpatient Medications:     albuterol (PROAIR HFA) 90 mcg/act inhaler, Inhale 1-2 puffs, Disp: , Rfl:     atorvastatin (LIPITOR) 20 mg tablet, Take 1 tablet (20 mg total) by mouth daily, Disp: 90 tablet, Rfl: 1    Fluticasone Propionate, Inhal, (FLOVENT DISKUS) 250 MCG/BLIST AEPB, Inhale 1 puff daily, Disp: 60 each, Rfl: 3    levothyroxine 200 mcg tablet, Take 1 tablet (200 mcg total) by mouth daily, Disp: 90 tablet, Rfl: 1    levothyroxine 50 mcg tablet, Take 1 tablet (50 mcg total) by mouth daily, Disp: 90 tablet, Rfl: 1    losartan (COZAAR) 50 mg tablet, Take 1 tablet (50 mg total) by mouth daily, Disp: 90 tablet, Rfl: 1    Allergies   Allergen Reactions    Cat Hair Extract      Eye irritation       Social History   Past Surgical History:   Procedure Laterality Date    KNEE ARTHROSCOPY Left     THYROID SURGERY      Ablation     Family History   Problem Relation Age of Onset    Diabetes Mother

## 2020-05-30 ENCOUNTER — APPOINTMENT (OUTPATIENT)
Dept: LAB | Facility: CLINIC | Age: 55
End: 2020-05-30
Payer: COMMERCIAL

## 2020-05-30 ENCOUNTER — TRANSCRIBE ORDERS (OUTPATIENT)
Dept: LAB | Facility: CLINIC | Age: 55
End: 2020-05-30

## 2020-05-30 DIAGNOSIS — Z11.4 ENCOUNTER FOR SCREENING FOR HIV: ICD-10-CM

## 2020-05-30 DIAGNOSIS — E03.9 HYPOTHYROIDISM, UNSPECIFIED TYPE: ICD-10-CM

## 2020-05-30 LAB — TSH SERPL DL<=0.05 MIU/L-ACNC: 4.06 UIU/ML (ref 0.36–3.74)

## 2020-05-30 PROCEDURE — 36415 COLL VENOUS BLD VENIPUNCTURE: CPT

## 2020-05-30 PROCEDURE — 84439 ASSAY OF FREE THYROXINE: CPT

## 2020-05-30 PROCEDURE — 84443 ASSAY THYROID STIM HORMONE: CPT

## 2020-05-30 PROCEDURE — 87389 HIV-1 AG W/HIV-1&-2 AB AG IA: CPT

## 2020-05-31 LAB
HIV 1+2 AB+HIV1 P24 AG SERPL QL IA: NORMAL
T4 FREE SERPL-MCNC: 1.01 NG/DL (ref 0.76–1.46)

## 2020-06-16 ENCOUNTER — OFFICE VISIT (OUTPATIENT)
Dept: INTERNAL MEDICINE CLINIC | Age: 55
End: 2020-06-16
Payer: COMMERCIAL

## 2020-06-16 VITALS
HEART RATE: 66 BPM | WEIGHT: 303.2 LBS | HEIGHT: 72 IN | BODY MASS INDEX: 41.07 KG/M2 | DIASTOLIC BLOOD PRESSURE: 78 MMHG | OXYGEN SATURATION: 98 % | TEMPERATURE: 98.9 F | SYSTOLIC BLOOD PRESSURE: 128 MMHG

## 2020-06-16 DIAGNOSIS — E78.00 HYPERCHOLESTEROLEMIA: ICD-10-CM

## 2020-06-16 DIAGNOSIS — L03.115 CELLULITIS OF RIGHT LEG: Primary | ICD-10-CM

## 2020-06-16 DIAGNOSIS — E03.9 HYPOTHYROIDISM, UNSPECIFIED TYPE: ICD-10-CM

## 2020-06-16 DIAGNOSIS — S81.801A WOUND OF RIGHT LOWER EXTREMITY, INITIAL ENCOUNTER: ICD-10-CM

## 2020-06-16 DIAGNOSIS — E66.01 OBESITY, CLASS III, BMI 40-49.9 (MORBID OBESITY) (HCC): ICD-10-CM

## 2020-06-16 DIAGNOSIS — I10 ESSENTIAL HYPERTENSION, BENIGN: ICD-10-CM

## 2020-06-16 PROCEDURE — 3078F DIAST BP <80 MM HG: CPT | Performed by: INTERNAL MEDICINE

## 2020-06-16 PROCEDURE — 1036F TOBACCO NON-USER: CPT | Performed by: INTERNAL MEDICINE

## 2020-06-16 PROCEDURE — 3074F SYST BP LT 130 MM HG: CPT | Performed by: INTERNAL MEDICINE

## 2020-06-16 PROCEDURE — 90714 TD VACC NO PRESV 7 YRS+ IM: CPT

## 2020-06-16 PROCEDURE — 99214 OFFICE O/P EST MOD 30 MIN: CPT | Performed by: INTERNAL MEDICINE

## 2020-06-16 PROCEDURE — 3008F BODY MASS INDEX DOCD: CPT | Performed by: INTERNAL MEDICINE

## 2020-06-16 PROCEDURE — 90471 IMMUNIZATION ADMIN: CPT

## 2020-06-16 RX ORDER — ATORVASTATIN CALCIUM 20 MG/1
20 TABLET, FILM COATED ORAL DAILY
Qty: 90 TABLET | Refills: 1 | Status: SHIPPED | OUTPATIENT
Start: 2020-06-16 | End: 2021-01-07 | Stop reason: SDUPTHER

## 2020-06-16 RX ORDER — LOSARTAN POTASSIUM 50 MG/1
50 TABLET ORAL DAILY
Qty: 90 TABLET | Refills: 1 | Status: SHIPPED | OUTPATIENT
Start: 2020-06-16 | End: 2021-01-07 | Stop reason: SDUPTHER

## 2020-06-16 RX ORDER — SULFAMETHOXAZOLE AND TRIMETHOPRIM 800; 160 MG/1; MG/1
1 TABLET ORAL EVERY 12 HOURS SCHEDULED
Qty: 14 TABLET | Refills: 0 | Status: SHIPPED | OUTPATIENT
Start: 2020-06-16 | End: 2020-06-23

## 2020-06-16 RX ORDER — LEVOTHYROXINE SODIUM 0.05 MG/1
50 TABLET ORAL DAILY
Qty: 90 TABLET | Refills: 1 | Status: SHIPPED | OUTPATIENT
Start: 2020-06-16 | End: 2021-01-07 | Stop reason: SDUPTHER

## 2020-06-16 RX ORDER — LEVOTHYROXINE SODIUM 0.2 MG/1
200 TABLET ORAL DAILY
Qty: 90 TABLET | Refills: 1 | Status: SHIPPED | OUTPATIENT
Start: 2020-06-16 | End: 2021-01-07 | Stop reason: SDUPTHER

## 2020-07-08 ENCOUNTER — HOSPITAL ENCOUNTER (OUTPATIENT)
Dept: ULTRASOUND IMAGING | Facility: HOSPITAL | Age: 55
Discharge: HOME | End: 2020-07-08
Payer: COMMERCIAL

## 2020-07-08 ENCOUNTER — OFFICE VISIT (OUTPATIENT)
Dept: INTERNAL MEDICINE CLINIC | Age: 55
End: 2020-07-08
Payer: COMMERCIAL

## 2020-07-08 VITALS
SYSTOLIC BLOOD PRESSURE: 124 MMHG | DIASTOLIC BLOOD PRESSURE: 74 MMHG | HEIGHT: 71 IN | TEMPERATURE: 97.2 F | WEIGHT: 304 LBS | HEART RATE: 69 BPM | OXYGEN SATURATION: 97 % | BODY MASS INDEX: 42.56 KG/M2

## 2020-07-08 DIAGNOSIS — L03.119 RECURRENT CELLULITIS OF LOWER EXTREMITY: ICD-10-CM

## 2020-07-08 DIAGNOSIS — I10 BENIGN ESSENTIAL HTN: Chronic | ICD-10-CM

## 2020-07-08 DIAGNOSIS — R60.0 LOWER EXTREMITY EDEMA: ICD-10-CM

## 2020-07-08 DIAGNOSIS — E03.9 HYPOTHYROIDISM, UNSPECIFIED TYPE: Primary | Chronic | ICD-10-CM

## 2020-07-08 PROCEDURE — 93970 EXTREMITY STUDY: CPT

## 2020-07-08 PROCEDURE — 3008F BODY MASS INDEX DOCD: CPT | Performed by: INTERNAL MEDICINE

## 2020-07-08 PROCEDURE — 1036F TOBACCO NON-USER: CPT | Performed by: INTERNAL MEDICINE

## 2020-07-08 PROCEDURE — 99214 OFFICE O/P EST MOD 30 MIN: CPT | Performed by: INTERNAL MEDICINE

## 2020-07-08 PROCEDURE — 3078F DIAST BP <80 MM HG: CPT | Performed by: INTERNAL MEDICINE

## 2020-07-08 PROCEDURE — 3074F SYST BP LT 130 MM HG: CPT | Performed by: INTERNAL MEDICINE

## 2020-07-08 RX ORDER — POTASSIUM CHLORIDE 20 MEQ/1
20 TABLET, EXTENDED RELEASE ORAL DAILY
Qty: 30 TABLET | Refills: 0 | Status: SHIPPED | OUTPATIENT
Start: 2020-07-08 | End: 2020-08-25 | Stop reason: SDUPTHER

## 2020-07-08 RX ORDER — CEPHALEXIN 500 MG/1
500 CAPSULE ORAL EVERY 6 HOURS SCHEDULED
Qty: 40 CAPSULE | Refills: 0 | Status: SHIPPED | OUTPATIENT
Start: 2020-07-08 | End: 2020-07-18

## 2020-07-08 RX ORDER — TORSEMIDE 20 MG/1
20 TABLET ORAL DAILY
Qty: 30 TABLET | Refills: 0 | Status: SHIPPED | OUTPATIENT
Start: 2020-07-08 | End: 2020-08-25 | Stop reason: SDUPTHER

## 2020-07-08 NOTE — PROGRESS NOTES
Assessment/Plan:    1  Bilateral lower extremity cellulitis  Will start patient on Keflex 500 mg 4 times a day for 10 days  2  Bilateral lower extremity edema  Will request venous Doppler to rule out DVT or venous insufficiency  Will also request echocardiogram  Will start patient on Demadex 20 mg tablet  He will take 2 tablet for 3 days then 1 tablet daily  Will also add K-Dur 20 mg once a day  Also advised to elevate legs while sitting on chair  3  Bronchial asthma  Advised to use the albuterol inhaler at least twice a day on regular basis along with Flovent    4  Hypothyroid  Continue with present dose of levothyroxine    5  Essential hypertension  Continue with present dose of losartan 50 mg daily            Follow-up in 1 week  Will request CBC, CMP and a BNP in before next visit  Diagnoses and all orders for this visit:    Hypothyroidism, unspecified type    Benign essential HTN    Lower extremity edema  -     torsemide (DEMADEX) 20 mg tablet; Take 1 tablet (20 mg total) by mouth daily  -     potassium chloride (K-DUR,KLOR-CON) 20 mEq tablet; Take 1 tablet (20 mEq total) by mouth daily  -     VAS lower limb venous duplex study, complete bilateral; Future  -     Echo complete with contrast if indicated; Future  -     CBC; Future  -     Comprehensive metabolic panel; Future  -     NT-BNP PRO; Future    Recurrent cellulitis of lower extremity  -     cephalexin (KEFLEX) 500 mg capsule; Take 1 capsule (500 mg total) by mouth every 6 (six) hours for 10 days  -     VAS lower limb venous duplex study, complete bilateral; Future               Subjective:          Patient ID: Gayle Amin is a 50 y o  male  Patient came to office with a complain of swelling of bilateral lower extremity and redness over the last few weeks  About 3 weeks ago he was treated with Bactrim DS b i d  For 7 days without any significant improvement  Denied any shortness of breath  No chest pain    As per patient swelling is less in the morning and get worse at the end of the day  The following portions of the patient's history were reviewed and updated as appropriate: allergies, current medications, past family history, past medical history, past social history, past surgical history and problem list     Review of Systems   Constitutional: Negative for fatigue and fever  HENT: Negative for congestion, ear discharge, ear pain, postnasal drip, sinus pressure, sore throat, tinnitus and trouble swallowing  Eyes: Negative for discharge, itching and visual disturbance  Respiratory: Negative for cough and shortness of breath  Cardiovascular: Positive for leg swelling  Negative for chest pain and palpitations  Gastrointestinal: Negative for abdominal pain, diarrhea, nausea and vomiting  Endocrine: Negative for cold intolerance and polyuria  Genitourinary: Negative for difficulty urinating, dysuria and urgency  Musculoskeletal: Negative for arthralgias and neck pain  Skin: Negative for rash  Allergic/Immunologic: Negative for environmental allergies  Neurological: Negative for dizziness, weakness and headaches  Psychiatric/Behavioral: The patient is not nervous/anxious            Past Medical History:   Diagnosis Date    Asthma     Disease of thyroid gland     Hematoma of left axilla     Last Assessed:9/23/2015    Hypertension     Hyperthyroidism     Hypothyroidism     Toxic multinodular goiter     Last Assessed:12/26/2013         Current Outpatient Medications:     albuterol (PROAIR HFA) 90 mcg/act inhaler, Inhale 1-2 puffs, Disp: , Rfl:     atorvastatin (LIPITOR) 20 mg tablet, Take 1 tablet (20 mg total) by mouth daily, Disp: 90 tablet, Rfl: 1    Fluticasone Propionate, Inhal, (FLOVENT DISKUS) 250 MCG/BLIST AEPB, Inhale 1 puff daily, Disp: 60 each, Rfl: 3    levothyroxine 200 mcg tablet, Take 1 tablet (200 mcg total) by mouth daily, Disp: 90 tablet, Rfl: 1    levothyroxine 50 mcg tablet, Take 1 tablet (50 mcg total) by mouth daily, Disp: 90 tablet, Rfl: 1    losartan (COZAAR) 50 mg tablet, Take 1 tablet (50 mg total) by mouth daily, Disp: 90 tablet, Rfl: 1    cephalexin (KEFLEX) 500 mg capsule, Take 1 capsule (500 mg total) by mouth every 6 (six) hours for 10 days, Disp: 40 capsule, Rfl: 0    potassium chloride (K-DUR,KLOR-CON) 20 mEq tablet, Take 1 tablet (20 mEq total) by mouth daily, Disp: 30 tablet, Rfl: 0    torsemide (DEMADEX) 20 mg tablet, Take 1 tablet (20 mg total) by mouth daily, Disp: 30 tablet, Rfl: 0    Allergies   Allergen Reactions    Cat Hair Extract      Eye irritation       Social History   Past Surgical History:   Procedure Laterality Date    KNEE ARTHROSCOPY Left     THYROID SURGERY      Ablation     Family History   Problem Relation Age of Onset    Diabetes Mother         mellitus    Hypothyroidism Sister     Cancer Maternal Grandmother     Parkinsonism Father     Heart disease Neg Hx     Stroke Neg Hx        Objective:  /74 (BP Location: Left arm, Patient Position: Sitting, Cuff Size: Large)   Pulse 69   Temp (!) 97 2 °F (36 2 °C) (Tympanic)   Ht 5' 11 46" (1 815 m) Comment: shoes on  Wt (!) 138 kg (304 lb) Comment: shoes on  SpO2 97%   BMI 41 86 kg/m²   Body mass index is 41 86 kg/m²  Physical Exam   Constitutional: He appears well-developed  HENT:   Head: Normocephalic  Mouth/Throat: Oropharynx is clear and moist    Eyes: Pupils are equal, round, and reactive to light  No scleral icterus  Neck: Normal range of motion  Neck supple  No tracheal deviation present  No thyromegaly present  Cardiovascular: Normal rate, regular rhythm and normal heart sounds  3+ bilateral lower extremity edema present   Pulmonary/Chest: Effort normal  No respiratory distress  He exhibits no tenderness  Scattered rhonchi   Abdominal: Soft  Bowel sounds are normal  He exhibits no mass  There is no tenderness  Musculoskeletal: Normal range of motion   He exhibits edema  Lymphadenopathy:     He has no cervical adenopathy  Neurological: He is alert  No cranial nerve deficit  Skin: Skin is warm  There is erythema  Bilateral lower extremity erythema present   Psychiatric: He has a normal mood and affect

## 2020-07-09 PROCEDURE — 93970 EXTREMITY STUDY: CPT | Performed by: SURGERY

## 2020-08-18 ENCOUNTER — HOSPITAL ENCOUNTER (OUTPATIENT)
Dept: NON INVASIVE DIAGNOSTICS | Facility: HOSPITAL | Age: 55
Discharge: HOME | End: 2020-08-18
Attending: INTERNAL MEDICINE
Payer: COMMERCIAL

## 2020-08-18 DIAGNOSIS — R60.0 LOWER EXTREMITY EDEMA: ICD-10-CM

## 2020-08-18 PROCEDURE — 93306 TTE W/DOPPLER COMPLETE: CPT | Performed by: INTERNAL MEDICINE

## 2020-08-18 PROCEDURE — 93306 TTE W/DOPPLER COMPLETE: CPT

## 2020-08-25 ENCOUNTER — OFFICE VISIT (OUTPATIENT)
Dept: INTERNAL MEDICINE CLINIC | Facility: CLINIC | Age: 55
End: 2020-08-25
Payer: COMMERCIAL

## 2020-08-25 VITALS
DIASTOLIC BLOOD PRESSURE: 78 MMHG | SYSTOLIC BLOOD PRESSURE: 138 MMHG | WEIGHT: 309 LBS | BODY MASS INDEX: 43.26 KG/M2 | HEART RATE: 78 BPM | HEIGHT: 71 IN | OXYGEN SATURATION: 94 % | TEMPERATURE: 98.2 F

## 2020-08-25 DIAGNOSIS — R60.0 LOWER EXTREMITY EDEMA: ICD-10-CM

## 2020-08-25 DIAGNOSIS — L03.115 CELLULITIS OF RIGHT LEG: ICD-10-CM

## 2020-08-25 DIAGNOSIS — I71.2 THORACIC AORTIC ANEURYSM WITHOUT RUPTURE (HCC): ICD-10-CM

## 2020-08-25 DIAGNOSIS — E03.9 HYPOTHYROIDISM, UNSPECIFIED TYPE: Primary | Chronic | ICD-10-CM

## 2020-08-25 DIAGNOSIS — E66.01 OBESITY, CLASS III, BMI 40-49.9 (MORBID OBESITY) (HCC): ICD-10-CM

## 2020-08-25 DIAGNOSIS — I10 BENIGN ESSENTIAL HTN: Chronic | ICD-10-CM

## 2020-08-25 PROCEDURE — 99214 OFFICE O/P EST MOD 30 MIN: CPT | Performed by: INTERNAL MEDICINE

## 2020-08-25 PROCEDURE — 1036F TOBACCO NON-USER: CPT | Performed by: INTERNAL MEDICINE

## 2020-08-25 PROCEDURE — 3075F SYST BP GE 130 - 139MM HG: CPT | Performed by: INTERNAL MEDICINE

## 2020-08-25 PROCEDURE — 3008F BODY MASS INDEX DOCD: CPT | Performed by: INTERNAL MEDICINE

## 2020-08-25 PROCEDURE — 3078F DIAST BP <80 MM HG: CPT | Performed by: INTERNAL MEDICINE

## 2020-08-25 RX ORDER — CEPHALEXIN 500 MG/1
500 CAPSULE ORAL EVERY 8 HOURS SCHEDULED
Qty: 30 CAPSULE | Refills: 0 | Status: SHIPPED | OUTPATIENT
Start: 2020-08-25 | End: 2020-09-04

## 2020-08-25 RX ORDER — TORSEMIDE 20 MG/1
20 TABLET ORAL 2 TIMES DAILY
Qty: 30 TABLET | Refills: 0 | Status: SHIPPED | OUTPATIENT
Start: 2020-08-25 | End: 2021-01-07

## 2020-08-25 RX ORDER — POTASSIUM CHLORIDE 20 MEQ/1
20 TABLET, EXTENDED RELEASE ORAL DAILY
Qty: 30 TABLET | Refills: 0 | Status: SHIPPED | OUTPATIENT
Start: 2020-08-25

## 2020-09-15 ENCOUNTER — OFFICE VISIT (OUTPATIENT)
Dept: INTERNAL MEDICINE CLINIC | Age: 55
End: 2020-09-15
Payer: COMMERCIAL

## 2020-09-15 VITALS
DIASTOLIC BLOOD PRESSURE: 82 MMHG | TEMPERATURE: 98.6 F | OXYGEN SATURATION: 96 % | WEIGHT: 302 LBS | BODY MASS INDEX: 42.28 KG/M2 | HEIGHT: 71 IN | SYSTOLIC BLOOD PRESSURE: 130 MMHG | HEART RATE: 80 BPM

## 2020-09-15 DIAGNOSIS — R23.8 BULLOUS LESION: Primary | ICD-10-CM

## 2020-09-15 DIAGNOSIS — E03.9 HYPOTHYROIDISM, UNSPECIFIED TYPE: Chronic | ICD-10-CM

## 2020-09-15 DIAGNOSIS — J45.20 MILD INTERMITTENT ASTHMA WITHOUT COMPLICATION: Chronic | ICD-10-CM

## 2020-09-15 DIAGNOSIS — L01.03 BULLOUS IMPETIGO: ICD-10-CM

## 2020-09-15 DIAGNOSIS — I10 BENIGN ESSENTIAL HTN: Chronic | ICD-10-CM

## 2020-09-15 PROCEDURE — 3079F DIAST BP 80-89 MM HG: CPT | Performed by: INTERNAL MEDICINE

## 2020-09-15 PROCEDURE — 1036F TOBACCO NON-USER: CPT | Performed by: INTERNAL MEDICINE

## 2020-09-15 PROCEDURE — 99214 OFFICE O/P EST MOD 30 MIN: CPT | Performed by: INTERNAL MEDICINE

## 2020-09-15 NOTE — PROGRESS NOTES
Assessment/Plan:     Diagnoses and all orders for this visit:    Bullous lesion  -     Ambulatory referral to Dermatology; Future    Bullous impetigo  -     Ambulatory referral to Dermatology; Future    Benign essential HTN    Hypothyroidism, unspecified type    Mild intermittent asthma without complication        BMI Counseling: Body mass index is 41 58 kg/m²  The BMI is above normal  Nutrition recommendations include decreasing portion sizes, encouraging healthy choices of fruits and vegetables and moderation in carbohydrate intake  Exercise recommendations include moderate physical activity 150 minutes/week  Subjective:   Chief Complaint   Patient presents with    Follow-up     pt  presents for follow up for hypothyroidism  L leg wound       Patient ID: Buck Mason is a 50 y o  male  HPI  Lesions on the both lower extremity with some impetigo type of infection and also as possible bullous lesions I will recommend him to see the dermatologist for further evaluation, also he has a chronic venous insufficiency will continue with the compression stockings  Hypothyroidism he is on Synthroid he did not go for the blood work this time    Asthma is very well controlled continue with the same medication no changes  Obesity again recommend weight loss  Hypercholesterolemia will follow-up the blood workup    The following portions of the patient's history were reviewed and updated as appropriate: allergies, current medications, past family history, past medical history, past social history, past surgical history and problem list     Review of Systems   Constitutional: Negative for appetite change, fatigue and fever  HENT: Negative for congestion, ear pain, hearing loss, nosebleeds, sneezing, tinnitus and voice change  Eyes: Negative for pain, discharge and redness  Respiratory: Negative for cough, chest tightness and wheezing      Cardiovascular: Negative for chest pain, palpitations and leg swelling  Gastrointestinal: Negative for abdominal pain, blood in stool, constipation, diarrhea, nausea and vomiting  Genitourinary: Negative for difficulty urinating, dysuria, hematuria and urgency  Musculoskeletal: Negative for arthralgias, back pain, gait problem and joint swelling  Skin: Negative for rash and wound  Skin lesions on the lower leg bilaterally more on the left than on the right side 1 lesion is open and some drainage from there   Allergic/Immunologic: Negative for environmental allergies  Neurological: Negative for dizziness, tremors, seizures, weakness, light-headedness and numbness  Hematological: Negative for adenopathy  Does not bruise/bleed easily  Psychiatric/Behavioral: Negative for behavioral problems and confusion  The patient is not nervous/anxious            Past Medical History:   Diagnosis Date    Asthma     Disease of thyroid gland     Hematoma of left axilla     Last Assessed:9/23/2015    Hypertension     Hyperthyroidism     Hypothyroidism     Toxic multinodular goiter     Last Assessed:12/26/2013         Current Outpatient Medications:     albuterol (PROAIR HFA) 90 mcg/act inhaler, Inhale 1-2 puffs, Disp: , Rfl:     atorvastatin (LIPITOR) 20 mg tablet, Take 1 tablet (20 mg total) by mouth daily, Disp: 90 tablet, Rfl: 1    Fluticasone Propionate, Inhal, (FLOVENT DISKUS) 250 MCG/BLIST AEPB, Inhale 1 puff daily, Disp: 60 each, Rfl: 3    levothyroxine 200 mcg tablet, Take 1 tablet (200 mcg total) by mouth daily, Disp: 90 tablet, Rfl: 1    levothyroxine 50 mcg tablet, Take 1 tablet (50 mcg total) by mouth daily, Disp: 90 tablet, Rfl: 1    losartan (COZAAR) 50 mg tablet, Take 1 tablet (50 mg total) by mouth daily, Disp: 90 tablet, Rfl: 1    potassium chloride (K-DUR,KLOR-CON) 20 mEq tablet, Take 1 tablet (20 mEq total) by mouth daily, Disp: 30 tablet, Rfl: 0    torsemide (DEMADEX) 20 mg tablet, Take 1 tablet (20 mg total) by mouth 2 (two) times a day (Patient taking differently: Take 20 mg by mouth daily ), Disp: 30 tablet, Rfl: 0    Allergies   Allergen Reactions    Cat Hair Extract      Eye irritation       Social History   Past Surgical History:   Procedure Laterality Date    KNEE ARTHROSCOPY Left     THYROID SURGERY      Ablation     Family History   Problem Relation Age of Onset    Diabetes Mother         mellitus    Hypothyroidism Sister     Cancer Maternal Grandmother     Parkinsonism Father     Heart disease Neg Hx     Stroke Neg Hx        Objective:  /82 (BP Location: Left arm, Patient Position: Sitting, Cuff Size: Large)   Pulse 80   Temp 98 6 °F (37 °C) (Temporal)   Ht 5' 11 46" (1 815 m)   Wt (!) 137 kg (302 lb)   SpO2 96%   BMI 41 58 kg/m²        Physical Exam  Constitutional:       Appearance: He is well-developed  HENT:      Right Ear: External ear normal    Eyes:      Conjunctiva/sclera: Conjunctivae normal       Pupils: Pupils are equal, round, and reactive to light  Neck:      Musculoskeletal: Normal range of motion  Thyroid: No thyromegaly  Vascular: No JVD  Cardiovascular:      Rate and Rhythm: Normal rate and regular rhythm  Heart sounds: Normal heart sounds  Pulmonary:      Breath sounds: Normal breath sounds  Abdominal:      General: Bowel sounds are normal       Palpations: Abdomen is soft  Musculoskeletal: Normal range of motion  Lymphadenopathy:      Cervical: No cervical adenopathy  Skin:     General: Skin is dry  Comments: Open area is on both lower extremity more on the left than on the right side there was some drainage the lesions looks like bullous pemphigoid lesion with chronic venous insufficiency, I will the recommend a dermatology consultation for further evaluation and possible biopsy of the lesion   Neurological:      Mental Status: He is alert and oriented to person, place, and time  Deep Tendon Reflexes: Reflexes are normal and symmetric     Psychiatric: Behavior: Behavior normal          Thought Content:  Thought content normal          Judgment: Judgment normal

## 2020-12-03 ENCOUNTER — VBI (OUTPATIENT)
Dept: ADMINISTRATIVE | Facility: OTHER | Age: 55
End: 2020-12-03

## 2021-01-02 ENCOUNTER — LAB (OUTPATIENT)
Dept: LAB | Facility: MEDICAL CENTER | Age: 56
End: 2021-01-02
Payer: COMMERCIAL

## 2021-01-02 DIAGNOSIS — R60.0 LOWER EXTREMITY EDEMA: ICD-10-CM

## 2021-01-02 DIAGNOSIS — E66.01 OBESITY, CLASS III, BMI 40-49.9 (MORBID OBESITY) (HCC): ICD-10-CM

## 2021-01-02 DIAGNOSIS — I10 ESSENTIAL HYPERTENSION, BENIGN: ICD-10-CM

## 2021-01-02 DIAGNOSIS — E03.9 HYPOTHYROIDISM, UNSPECIFIED TYPE: ICD-10-CM

## 2021-01-02 LAB
ALBUMIN SERPL BCP-MCNC: 3.7 G/DL (ref 3.5–5)
ALP SERPL-CCNC: 88 U/L (ref 46–116)
ALT SERPL W P-5'-P-CCNC: 22 U/L (ref 12–78)
ANION GAP SERPL CALCULATED.3IONS-SCNC: 2 MMOL/L (ref 4–13)
AST SERPL W P-5'-P-CCNC: 13 U/L (ref 5–45)
BILIRUB SERPL-MCNC: 0.33 MG/DL (ref 0.2–1)
BUN SERPL-MCNC: 12 MG/DL (ref 5–25)
CALCIUM SERPL-MCNC: 9.2 MG/DL (ref 8.3–10.1)
CHLORIDE SERPL-SCNC: 103 MMOL/L (ref 100–108)
CO2 SERPL-SCNC: 35 MMOL/L (ref 21–32)
CREAT SERPL-MCNC: 0.81 MG/DL (ref 0.6–1.3)
ERYTHROCYTE [DISTWIDTH] IN BLOOD BY AUTOMATED COUNT: 15.7 % (ref 11.6–15.1)
EST. AVERAGE GLUCOSE BLD GHB EST-MCNC: 126 MG/DL
GFR SERPL CREATININE-BSD FRML MDRD: 101 ML/MIN/1.73SQ M
GLUCOSE P FAST SERPL-MCNC: 89 MG/DL (ref 65–99)
HBA1C MFR BLD: 6 %
HCT VFR BLD AUTO: 46.8 % (ref 36.5–49.3)
HGB BLD-MCNC: 13.9 G/DL (ref 12–17)
MCH RBC QN AUTO: 26.3 PG (ref 26.8–34.3)
MCHC RBC AUTO-ENTMCNC: 29.7 G/DL (ref 31.4–37.4)
MCV RBC AUTO: 89 FL (ref 82–98)
NT-PROBNP SERPL-MCNC: 9 PG/ML
PLATELET # BLD AUTO: 191 THOUSANDS/UL (ref 149–390)
PMV BLD AUTO: 10.5 FL (ref 8.9–12.7)
POTASSIUM SERPL-SCNC: 4.2 MMOL/L (ref 3.5–5.3)
PROT SERPL-MCNC: 7.6 G/DL (ref 6.4–8.2)
RBC # BLD AUTO: 5.29 MILLION/UL (ref 3.88–5.62)
SODIUM SERPL-SCNC: 140 MMOL/L (ref 136–145)
T4 FREE SERPL-MCNC: 0.86 NG/DL (ref 0.76–1.46)
TSH SERPL DL<=0.05 MIU/L-ACNC: 13.1 UIU/ML (ref 0.36–3.74)
WBC # BLD AUTO: 7.7 THOUSAND/UL (ref 4.31–10.16)

## 2021-01-02 PROCEDURE — 36415 COLL VENOUS BLD VENIPUNCTURE: CPT

## 2021-01-02 PROCEDURE — 85027 COMPLETE CBC AUTOMATED: CPT

## 2021-01-02 PROCEDURE — 83036 HEMOGLOBIN GLYCOSYLATED A1C: CPT

## 2021-01-02 PROCEDURE — 83880 ASSAY OF NATRIURETIC PEPTIDE: CPT

## 2021-01-02 PROCEDURE — 84439 ASSAY OF FREE THYROXINE: CPT

## 2021-01-02 PROCEDURE — 80053 COMPREHEN METABOLIC PANEL: CPT

## 2021-01-02 PROCEDURE — 84443 ASSAY THYROID STIM HORMONE: CPT

## 2021-01-07 ENCOUNTER — TELEMEDICINE (OUTPATIENT)
Dept: INTERNAL MEDICINE CLINIC | Age: 56
End: 2021-01-07
Payer: COMMERCIAL

## 2021-01-07 DIAGNOSIS — E03.9 HYPOTHYROIDISM, UNSPECIFIED TYPE: ICD-10-CM

## 2021-01-07 DIAGNOSIS — I10 BENIGN ESSENTIAL HTN: Chronic | ICD-10-CM

## 2021-01-07 DIAGNOSIS — J45.20 MILD INTERMITTENT ASTHMA WITHOUT COMPLICATION: Primary | Chronic | ICD-10-CM

## 2021-01-07 DIAGNOSIS — E78.00 HYPERCHOLESTEROLEMIA: ICD-10-CM

## 2021-01-07 DIAGNOSIS — E66.01 OBESITY, CLASS III, BMI 40-49.9 (MORBID OBESITY) (HCC): ICD-10-CM

## 2021-01-07 DIAGNOSIS — I10 ESSENTIAL HYPERTENSION, BENIGN: ICD-10-CM

## 2021-01-07 PROCEDURE — 3725F SCREEN DEPRESSION PERFORMED: CPT | Performed by: INTERNAL MEDICINE

## 2021-01-07 PROCEDURE — 1036F TOBACCO NON-USER: CPT | Performed by: INTERNAL MEDICINE

## 2021-01-07 PROCEDURE — 99214 OFFICE O/P EST MOD 30 MIN: CPT | Performed by: INTERNAL MEDICINE

## 2021-01-07 RX ORDER — LEVOTHYROXINE SODIUM 0.07 MG/1
75 TABLET ORAL DAILY
Qty: 90 TABLET | Refills: 1 | Status: SHIPPED | OUTPATIENT
Start: 2021-01-07 | End: 2021-10-14 | Stop reason: SDUPTHER

## 2021-01-07 RX ORDER — LOSARTAN POTASSIUM 50 MG/1
50 TABLET ORAL DAILY
Qty: 90 TABLET | Refills: 1 | Status: SHIPPED | OUTPATIENT
Start: 2021-01-07 | End: 2021-12-08 | Stop reason: SDUPTHER

## 2021-01-07 RX ORDER — ATORVASTATIN CALCIUM 20 MG/1
20 TABLET, FILM COATED ORAL DAILY
Qty: 90 TABLET | Refills: 1 | Status: SHIPPED | OUTPATIENT
Start: 2021-01-07 | End: 2021-12-08 | Stop reason: SDUPTHER

## 2021-01-07 RX ORDER — LEVOTHYROXINE SODIUM 0.2 MG/1
200 TABLET ORAL DAILY
Qty: 90 TABLET | Refills: 1 | Status: SHIPPED | OUTPATIENT
Start: 2021-01-07 | End: 2021-12-08 | Stop reason: SDUPTHER

## 2021-01-07 RX ORDER — ALBUTEROL SULFATE 90 UG/1
1-2 AEROSOL, METERED RESPIRATORY (INHALATION) DAILY
Qty: 1 INHALER | Refills: 5 | Status: SHIPPED | OUTPATIENT
Start: 2021-01-07 | End: 2022-03-29 | Stop reason: SDUPTHER

## 2021-01-07 NOTE — PROGRESS NOTES
Virtual Regular Visit      Assessment/Plan:    Problem List Items Addressed This Visit        Endocrine    Hypothyroid (Chronic)    Relevant Medications    levothyroxine 200 mcg tablet    levothyroxine 75 mcg tablet       Respiratory    Asthma - Primary (Chronic)    Relevant Medications    albuterol (ProAir HFA) 90 mcg/act inhaler       Cardiovascular and Mediastinum    Benign essential HTN (Chronic)    Relevant Medications    losartan (COZAAR) 50 mg tablet       Other    Obesity, Class III, BMI 40-49 9 (morbid obesity) ([address] )      Other Visit Diagnoses     Hypercholesterolemia        Relevant Medications    atorvastatin (LIPITOR) 20 mg tablet    Essential hypertension, benign        Relevant Medications    losartan (COZAAR) 50 mg tablet          BMI Counseling: There is no height or weight on file to calculate BMI  The BMI is above normal  Nutrition recommendations include decreasing portion sizes, encouraging healthy choices of fruits and vegetables and moderation in carbohydrate intake  Exercise recommendations include moderate physical activity 150 minutes/week  Reason for visit is   Chief Complaint   Patient presents with    Follow-up     3 month fu - labs 1/2/2021 hypothroid         Encounter provider Danish Haile MD    Provider located at [address] [place] [id number]      Recent Visits  No visits were found meeting these conditions  Showing recent visits within past 7 days and meeting all other requirements     Today's Visits  Date Type Provider Dept   01/07/21 Telemedicine Danish Haile MD [facility]   Showing today's visits and meeting all other requirements     Future Appointments  No visits were found meeting these conditions  Showing future appointments within next 150 days and meeting all other requirements        The patient was identified by name and date of birth   Bismark Estrella Hermelinda was informed that this is a telemedicine visit and that the visit is being conducted through [facility] and patient was informed that this is not a secure, HIPAA-compliant platform  He agrees to proceed     My office door was closed  No one else was in the room  He acknowledged consent and understanding of privacy and security of the video platform  The patient has agreed to participate and understands they can discontinue the visit at any time  Patient is aware this is a billable service  Subjective  Jessica Grady is a 50 y o  male follow-up for multiple medical problems   [de-identified] years young gentleman with history of    1  Morbid obesity discussed with him diet exercise in his weight loss he does not do much except it he is a full-time worker a  and  he is not able to lose weight he is not checking his weight    2  Hypertension is well controlled continue with same medication no changes    3  Hypercholesterolemia will follow up with the lipid panel before the next office visit     4  Hypothyroidism with some long-standing problem he is doing well with that but his TSH is elevated 13 he is on 250 mcg increase it to 275 mcg and follow up with the TSH in 3 months I ask him about the his compliance and taking the medication early in the morning on empty stomach he said he is taking his medication in the morning with empty stomach  5  Impaired fasting blood sugar his blood sugar is good and his hemoglobin A1c is 6 0 continue with the present management no new medications    6   History of asthma stable no new problems        Past Medical History:   Diagnosis Date    Asthma     Disease of thyroid gland     Hematoma of left axilla     Last Assessed:9/23/2015    Hypertension     Hyperthyroidism     Hypothyroidism     Toxic multinodular goiter     Last Assessed:12/26/2013       Past Surgical History:   Procedure Laterality Date    KNEE ARTHROSCOPY Left     THYROID SURGERY      Ablation       Current Outpatient Medications   Medication Sig Dispense Refill    albuterol (ProAir HFA) 90 mcg/act inhaler Inhale 1-2 puffs daily 1 Inhaler 5    atorvastatin (LIPITOR) 20 mg tablet Take 1 tablet (20 mg total) by mouth daily 90 tablet 1    Fluticasone Propionate, Inhal, (FLOVENT DISKUS) 250 MCG/BLIST AEPB Inhale 1 puff daily 60 each 3    levothyroxine 200 mcg tablet Take 1 tablet (200 mcg total) by mouth daily 90 tablet 1    levothyroxine 75 mcg tablet Take 1 tablet (75 mcg total) by mouth daily 90 tablet 1    losartan (COZAAR) 50 mg tablet Take 1 tablet (50 mg total) by mouth daily 90 tablet 1    potassium chloride (K-DUR,KLOR-CON) 20 mEq tablet Take 1 tablet (20 mEq total) by mouth daily 30 tablet 0     No current facility-administered medications for this visit  Allergies   Allergen Reactions    Cat Hair Extract      Eye irritation       Review of Systems    Video Exam    There were no vitals filed for this visit  Physical Exam     I spent 20 minutes directly with the patient during this visit      VIRTUAL VISIT DISCLAIMER    Susy Gordon acknowledges that he has consented to an online visit or consultation  He understands that the online visit is based solely on information provided by him, and that, in the absence of a face-to-face physical evaluation by the physician, the diagnosis he receives is both limited and provisional in terms of accuracy and completeness  This is not intended to replace a full medical face-to-face evaluation by the physician  Susy Gordon understands and accepts these terms

## 2021-03-24 ENCOUNTER — HOSPITAL ENCOUNTER (EMERGENCY)
Facility: HOSPITAL | Age: 56
Discharge: HOME | End: 2021-03-24
Attending: EMERGENCY MEDICINE
Payer: OTHER MISCELLANEOUS

## 2021-03-24 VITALS
DIASTOLIC BLOOD PRESSURE: 87 MMHG | HEART RATE: 77 BPM | TEMPERATURE: 98.4 F | SYSTOLIC BLOOD PRESSURE: 187 MMHG | OXYGEN SATURATION: 96 % | RESPIRATION RATE: 18 BRPM

## 2021-03-24 DIAGNOSIS — S69.92XA INJURY OF FINGER OF LEFT HAND, INITIAL ENCOUNTER: Primary | ICD-10-CM

## 2021-03-24 PROCEDURE — 99283 EMERGENCY DEPT VISIT LOW MDM: CPT

## 2021-03-24 PROCEDURE — 99282 EMERGENCY DEPT VISIT SF MDM: CPT | Performed by: EMERGENCY MEDICINE

## 2021-03-24 PROCEDURE — 12001 RPR S/N/AX/GEN/TRNK 2.5CM/<: CPT | Performed by: EMERGENCY MEDICINE

## 2021-03-24 NOTE — ED PROVIDER NOTES
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   He states that the left ring finger got chewed up by the machine  He immediately wrapped his finger and notified his supervisor  He came to the emergency department for evaluation and continues to hold pressure on the finger  His last tetanus shot was about 1 year ago  Patient states that he was working with wood in the machine was clean  He is not on anticoagulation  History provided by:  Patient  Finger Laceration  Location:  Finger  Finger laceration location:  L ring finger  Depth: Through underlying tissue  Quality: avulsion and jagged    Bleeding: venous    Time since incident:  1 hour  Pain details:     Severity:  Mild    Timing:  Constant    Progression:  Unchanged  Foreign body present:  No foreign bodies  Relieved by:  Pressure  Tetanus status:  Up to date  Associated symptoms: no fever, no numbness, no rash and no swelling        Prior to Admission Medications   Prescriptions Last Dose Informant Patient Reported? Taking?    Fluticasone Propionate, Inhal, (FLOVENT DISKUS) 250 MCG/BLIST AEPB   No No   Sig: Inhale 1 puff daily   albuterol (ProAir HFA) 90 mcg/act inhaler   No No   Sig: Inhale 1-2 puffs daily   atorvastatin (LIPITOR) 20 mg tablet   No No   Sig: Take 1 tablet (20 mg total) by mouth daily   levothyroxine 200 mcg tablet   No No   Sig: Take 1 tablet (200 mcg total) by mouth daily   levothyroxine 75 mcg tablet   No No   Sig: Take 1 tablet (75 mcg total) by mouth daily   losartan (COZAAR) 50 mg tablet   No No   Sig: Take 1 tablet (50 mg total) by mouth daily   potassium chloride (K-DUR,KLOR-CON) 20 mEq tablet   No No   Sig: Take 1 tablet (20 mEq total) by mouth daily      Facility-Administered Medications: None       Past Medical History:   Diagnosis Date    Asthma     Disease of thyroid gland     Hematoma of left axilla     Last Assessed:2015    Hypertension     Hyperthyroidism     Hypothyroidism     Toxic multinodular goiter     Last Assessed:2013       Past Surgical History:   Procedure Laterality Date    KNEE ARTHROSCOPY Left     THYROID SURGERY      Ablation       Family History   Problem Relation Age of Onset    Diabetes Mother         mellitus    Hypothyroidism Sister     Cancer Maternal Grandmother     Parkinsonism Father     Heart disease Neg Hx     Stroke Neg Hx      I have reviewed and agree with the history as documented  E-Cigarette/Vaping    E-Cigarette Use Never User      E-Cigarette/Vaping Substances     Social History     Tobacco Use    Smoking status: Former Smoker     Types: Cigarettes     Quit date: 2007     Years since quittin 2    Smokeless tobacco: Never Used   Substance Use Topics    Alcohol use: Yes     Frequency: 4 or more times a week     Comment: Social drinker     Drug use: No       Review of Systems   Constitutional: Negative for chills, diaphoresis and fever  HENT: Negative for nosebleeds, sore throat and trouble swallowing  Eyes: Negative for photophobia, pain and visual disturbance  Respiratory: Negative for cough, chest tightness and shortness of breath  Cardiovascular: Negative for chest pain, palpitations and leg swelling  Gastrointestinal: Negative for abdominal pain, constipation, diarrhea, nausea and vomiting  Endocrine: Negative for polydipsia and polyuria  Genitourinary: Negative for difficulty urinating, dysuria and hematuria  Musculoskeletal: Negative for back pain, neck pain and neck stiffness  Skin: Positive for wound  Negative for pallor and rash  Neurological: Negative for dizziness, syncope, light-headedness and headaches     All other systems reviewed and are negative  Physical Exam  Physical Exam  Vitals signs and nursing note reviewed  Constitutional:       General: He is not in acute distress  Appearance: He is well-developed  HENT:      Head: Normocephalic and atraumatic  Eyes:      Pupils: Pupils are equal, round, and reactive to light  Neck:      Musculoskeletal: Normal range of motion and neck supple  Cardiovascular:      Rate and Rhythm: Normal rate and regular rhythm  Pulses: Normal pulses  Heart sounds: Normal heart sounds  Pulmonary:      Effort: Pulmonary effort is normal  No respiratory distress  Breath sounds: Normal breath sounds  Abdominal:      General: There is no distension  Palpations: Abdomen is soft  Abdomen is not rigid  Tenderness: There is no abdominal tenderness  There is no guarding or rebound  Musculoskeletal: Normal range of motion  General: No tenderness  Hands:    Lymphadenopathy:      Cervical: No cervical adenopathy  Skin:     General: Skin is warm and dry  Capillary Refill: Capillary refill takes less than 2 seconds  Neurological:      Mental Status: He is alert and oriented to person, place, and time  Cranial Nerves: No cranial nerve deficit  Sensory: No sensory deficit           Vital Signs  ED Triage Vitals   Temperature Pulse Respirations Blood Pressure SpO2   03/24/21 1137 03/24/21 1137 03/24/21 1137 03/24/21 1138 03/24/21 1137   98 4 °F (36 9 °C) 77 18 (!) 187/87 96 %      Temp Source Heart Rate Source Patient Position - Orthostatic VS BP Location FiO2 (%)   03/24/21 1137 03/24/21 1137 03/24/21 1137 03/24/21 1137 --   Oral Monitor Sitting Right arm       Pain Score       03/24/21 1137       8           Vitals:    03/24/21 1137 03/24/21 1138   BP:  (!) 187/87   Pulse: 77    Patient Position - Orthostatic VS: Sitting          Visual Acuity      ED Medications  Medications - No data to display    Diagnostic Studies  Results Reviewed     None No orders to display              Procedures  Laceration repair    Date/Time: 3/24/2021 1:00 PM  Performed by: Lc Barnard DO  Authorized by: Lc Barnard DO   Consent: Verbal consent obtained  Written consent not obtained  Risks and benefits: risks, benefits and alternatives were discussed  Consent given by: patient  Patient understanding: patient states understanding of the procedure being performed  Required items: required blood products, implants, devices, and special equipment available  Patient identity confirmed: arm band  Body area: upper extremity  Location details: left ring finger  Laceration length: 1 (skin avulsion) cm  Foreign bodies: no foreign bodies  Tendon involvement: none  Nerve involvement: none  Vascular damage: no    Sedation:  Patient sedated: no        Procedure Details:  Preparation: Patient was prepped and draped in the usual sterile fashion  Irrigation solution: saline  Irrigation method: syringe  Amount of cleaning: standard  Debridement: none  Degree of undermining: none  Skin closure: glue (tissie adhesive placed over skin avulsion)  Dressing: tube gauze and 4x4 sterile gauze  Patient tolerance: patient tolerated the procedure well with no immediate complications               ED Course                                           MDM  Number of Diagnoses or Management Options  Injury of finger of left hand, initial encounter: new and does not require workup  Diagnosis management comments: Patient presents with injury to left ring finger  Patient has a small skin avulsion to the finger tip with a small area of macerated tissue inferior to the avulsion  Tourniquet applied and wound was irrigated extensively  Tissue adhesive placed over the skin avulsion to prevent further bleeding  Wound was dressed with Xeroform and tube gauze  Patient advised to change dressing daily and observe for signs of wound infection    He was educated on the signs and agrees to return to ED immediately if he develops signs of wound infection  The wound is not deep and he does not require x-ray to check for bony involvement  He is stable for discharge in outpatient follow-up  Amount and/or Complexity of Data Reviewed  Review and summarize past medical records: yes    Risk of Complications, Morbidity, and/or Mortality  Presenting problems: moderate  Diagnostic procedures: minimal  Management options: moderate    Patient Progress  Patient progress: improved      Disposition  Final diagnoses:   Injury of finger of left hand, initial encounter     Time reflects when diagnosis was documented in both MDM as applicable and the Disposition within this note     Time User Action Codes Description Comment    3/24/2021 12:31 PM Chapincito Bailey Add [F45 [id number]] Injury of finger of left hand, initial encounter       ED Disposition     ED Disposition Condition Date/Time Comment    Discharge Stable Wed Mar 24, 2021 12:31 PM [address] [facility] discharge to home/self care  Follow-up Information     Follow up With Specialties Details Why Noah Harrington MD Internal Medicine Schedule an appointment as soon as possible for a visit  Return to ED if he develops swelling, redness of finger, fever or other concerns   [address]             Discharge Medication List as of 3/24/2021 12:32 PM      CONTINUE these medications which have NOT CHANGED    Details   albuterol (ProAir HFA) 90 mcg/act inhaler Inhale 1-2 puffs daily, Starting Thu 1/7/2021, Normal      atorvastatin (LIPITOR) 20 mg tablet Take 1 tablet (20 mg total) by mouth daily, Starting Thu 1/7/2021, Normal      Fluticasone Propionate, Inhal, (FLOVENT DISKUS) 250 MCG/BLIST AEPB Inhale 1 puff daily, Starting Fri 10/25/2019, Normal      !! levothyroxine 200 mcg tablet Take 1 tablet (200 mcg total) by mouth daily, Starting Thu 1/7/2021, Normal      !! levothyroxine 75 mcg tablet Take 1 tablet (75 mcg total) by mouth daily, Starting Thu 1/7/2021, Normal      losartan (COZAAR) 50 mg tablet Take 1 tablet (50 mg total) by mouth daily, Starting Thu 1/7/2021, Normal      potassium chloride (K-DUR,KLOR-CON) 20 mEq tablet Take 1 tablet (20 mEq total) by mouth daily, Starting Tue 8/25/2020, Normal       !! - Potential duplicate medications found  Please discuss with provider  No discharge procedures on file      PDMP Review     None          ED Provider  Electronically Signed by           Linda Weldon DO  03/25/21 7452

## 2021-07-08 ENCOUNTER — VBI (OUTPATIENT)
Dept: ADMINISTRATIVE | Facility: OTHER | Age: 56
End: 2021-07-08

## 2021-08-13 ENCOUNTER — TELEPHONE (OUTPATIENT)
Dept: INTERNAL MEDICINE CLINIC | Facility: CLINIC | Age: 56
End: 2021-08-13

## 2021-08-13 NOTE — TELEPHONE ENCOUNTER
LMOM for pt to call me at [facility]    Pt's colouard order   Dr Paul Brito wanted him to do some sort of CRC screen  Does pt want another cologuard ordered or does pt want a colonoscopy  Please advise

## 2021-08-25 NOTE — TELEPHONE ENCOUNTER
Called and informed pt I ordered henny  Instructed him to call office in 2 weeks if he didn't receive kit in mail    Informed him he can return to any one of offices when he completed test  Pt verbally acknowledged understanding

## 2021-09-14 ENCOUNTER — APPOINTMENT (OUTPATIENT)
Dept: LAB | Facility: MEDICAL CENTER | Age: 56
End: 2021-09-14
Payer: COMMERCIAL

## 2021-09-14 ENCOUNTER — RA CDI HCC (OUTPATIENT)
Dept: OTHER | Facility: HOSPITAL | Age: 56
End: 2021-09-14

## 2021-09-14 DIAGNOSIS — I10 BENIGN ESSENTIAL HTN: Chronic | ICD-10-CM

## 2021-09-14 DIAGNOSIS — E03.9 HYPOTHYROIDISM, UNSPECIFIED TYPE: ICD-10-CM

## 2021-09-14 DIAGNOSIS — E66.01 OBESITY, CLASS III, BMI 40-49.9 (MORBID OBESITY) (HCC): ICD-10-CM

## 2021-09-14 LAB
ANION GAP SERPL CALCULATED.3IONS-SCNC: 2 MMOL/L (ref 4–13)
BUN SERPL-MCNC: 14 MG/DL (ref 5–25)
CALCIUM SERPL-MCNC: 9.1 MG/DL (ref 8.3–10.1)
CHLORIDE SERPL-SCNC: 104 MMOL/L (ref 100–108)
CO2 SERPL-SCNC: 32 MMOL/L (ref 21–32)
CREAT SERPL-MCNC: 0.84 MG/DL (ref 0.6–1.3)
GFR SERPL CREATININE-BSD FRML MDRD: 99 ML/MIN/1.73SQ M
GLUCOSE P FAST SERPL-MCNC: 106 MG/DL (ref 65–99)
POTASSIUM SERPL-SCNC: 4.7 MMOL/L (ref 3.5–5.3)
SODIUM SERPL-SCNC: 138 MMOL/L (ref 136–145)
T4 FREE SERPL-MCNC: 1.19 NG/DL (ref 0.76–1.46)
TSH SERPL DL<=0.05 MIU/L-ACNC: 4.26 UIU/ML (ref 0.36–3.74)

## 2021-09-14 PROCEDURE — 36415 COLL VENOUS BLD VENIPUNCTURE: CPT

## 2021-09-14 PROCEDURE — 80048 BASIC METABOLIC PNL TOTAL CA: CPT

## 2021-09-14 PROCEDURE — 84443 ASSAY THYROID STIM HORMONE: CPT

## 2021-09-14 PROCEDURE — 84439 ASSAY OF FREE THYROXINE: CPT

## 2021-09-14 NOTE — PROGRESS NOTES
[facility]  coding opportunities       Chart reviewed, no opportunity found: CHART REVIEWED, NO OPPORTUNITY FOUND                        Patients insurance company: [employer] (Medicare Advantage and Commercial)

## 2021-10-14 DIAGNOSIS — E03.9 HYPOTHYROIDISM, UNSPECIFIED TYPE: ICD-10-CM

## 2021-10-15 RX ORDER — LEVOTHYROXINE SODIUM 0.07 MG/1
75 TABLET ORAL DAILY
Qty: 90 TABLET | Refills: 1 | Status: SHIPPED | OUTPATIENT
Start: 2021-10-15 | End: 2022-08-04 | Stop reason: SDUPTHER

## 2021-10-26 ENCOUNTER — OFFICE VISIT (OUTPATIENT)
Dept: INTERNAL MEDICINE CLINIC | Age: 56
End: 2021-10-26
Payer: COMMERCIAL

## 2021-10-26 VITALS
TEMPERATURE: 98.2 F | HEART RATE: 78 BPM | OXYGEN SATURATION: 96 % | HEIGHT: 71 IN | WEIGHT: 305.3 LBS | SYSTOLIC BLOOD PRESSURE: 134 MMHG | DIASTOLIC BLOOD PRESSURE: 80 MMHG | BODY MASS INDEX: 42.74 KG/M2

## 2021-10-26 DIAGNOSIS — E78.01 FAMILIAL HYPERCHOLESTEROLEMIA: Primary | Chronic | ICD-10-CM

## 2021-10-26 DIAGNOSIS — E66.01 OBESITY, CLASS III, BMI 40-49.9 (MORBID OBESITY) (HCC): ICD-10-CM

## 2021-10-26 DIAGNOSIS — R73.01 IMPAIRED FASTING BLOOD SUGAR: ICD-10-CM

## 2021-10-26 DIAGNOSIS — J45.20 MILD INTERMITTENT ASTHMA WITHOUT COMPLICATION: Chronic | ICD-10-CM

## 2021-10-26 DIAGNOSIS — E03.9 HYPOTHYROIDISM, UNSPECIFIED TYPE: Chronic | ICD-10-CM

## 2021-10-26 PROBLEM — L03.115 CELLULITIS OF RIGHT LEG: Status: RESOLVED | Noted: 2020-06-16 | Resolved: 2021-10-26

## 2021-10-26 PROBLEM — S81.801A LEG WOUND, RIGHT: Status: RESOLVED | Noted: 2020-06-16 | Resolved: 2021-10-26

## 2021-10-26 PROCEDURE — 99214 OFFICE O/P EST MOD 30 MIN: CPT | Performed by: INTERNAL MEDICINE

## 2021-12-01 ENCOUNTER — VBI (OUTPATIENT)
Dept: ADMINISTRATIVE | Facility: OTHER | Age: 56
End: 2021-12-01

## 2021-12-08 DIAGNOSIS — E78.00 HYPERCHOLESTEROLEMIA: ICD-10-CM

## 2021-12-08 DIAGNOSIS — E03.9 HYPOTHYROIDISM, UNSPECIFIED TYPE: ICD-10-CM

## 2021-12-08 DIAGNOSIS — I10 ESSENTIAL HYPERTENSION, BENIGN: ICD-10-CM

## 2021-12-08 RX ORDER — ATORVASTATIN CALCIUM 20 MG/1
20 TABLET, FILM COATED ORAL DAILY
Qty: 90 TABLET | Refills: 1 | Status: SHIPPED | OUTPATIENT
Start: 2021-12-08

## 2021-12-08 RX ORDER — LEVOTHYROXINE SODIUM 0.2 MG/1
200 TABLET ORAL DAILY
Qty: 90 TABLET | Refills: 1 | Status: SHIPPED | OUTPATIENT
Start: 2021-12-08

## 2021-12-08 RX ORDER — LOSARTAN POTASSIUM 50 MG/1
50 TABLET ORAL DAILY
Qty: 90 TABLET | Refills: 1 | Status: SHIPPED | OUTPATIENT
Start: 2021-12-08

## 2022-02-17 DIAGNOSIS — J45.909 PERSISTENT ASTHMA WITHOUT COMPLICATION, UNSPECIFIED ASTHMA SEVERITY: ICD-10-CM

## 2022-02-17 RX ORDER — FLUTICASONE PROPIONATE 250 UG/1
1 POWDER, METERED RESPIRATORY (INHALATION) DAILY
Qty: 60 BLISTER | Refills: 2 | Status: SHIPPED | OUTPATIENT
Start: 2022-02-17

## 2022-02-21 ENCOUNTER — RA CDI HCC (OUTPATIENT)
Dept: OTHER | Facility: HOSPITAL | Age: 57
End: 2022-02-21

## 2022-02-21 NOTE — PROGRESS NOTES
[facility]  coding opportunities          Number of diagnosis code(s) already on the problem list added to FYI fla                     Patients insurance company: [employer] (Medicare Advantage and Commercial)     Visit status: Patient canceled the appointment        [facility]  coding opportunities          Number of diagnosis code(s) already on the problem list added to FYI fla     With the beginning of the new year, this is a reminder to address ALL [facility]  (risk adjustment) codes for  as patient scores reset to zero MARILEE   E66 01 Obesity ([facility] )                   Patients insurance company: [employer] ([employer]

## 2022-03-29 DIAGNOSIS — J45.20 MILD INTERMITTENT ASTHMA WITHOUT COMPLICATION: Chronic | ICD-10-CM

## 2022-03-29 RX ORDER — ALBUTEROL SULFATE 90 UG/1
1-2 AEROSOL, METERED RESPIRATORY (INHALATION) DAILY
Qty: 8.5 G | Refills: 1 | Status: SHIPPED | OUTPATIENT
Start: 2022-03-29

## 2022-04-28 ENCOUNTER — RA CDI HCC (OUTPATIENT)
Dept: OTHER | Facility: HOSPITAL | Age: 57
End: 2022-04-28

## 2022-04-28 NOTE — PROGRESS NOTES
[facility]  coding opportunities          Chart Reviewed number of suggestions sent to Provider: 1   With the beginning of the new year, this is a reminder to address ALL [facility]  (risk adjustment) codes for 2022 as patient scores reset to zero MARILEE   E66 01 Obesity ([facility] )        Patients Insurance        Commercial Insurance: [employer]

## 2022-04-30 ENCOUNTER — APPOINTMENT (OUTPATIENT)
Dept: LAB | Facility: MEDICAL CENTER | Age: 57
End: 2022-04-30
Payer: COMMERCIAL

## 2022-04-30 DIAGNOSIS — R73.01 IMPAIRED FASTING BLOOD SUGAR: ICD-10-CM

## 2022-04-30 DIAGNOSIS — E03.9 HYPOTHYROIDISM, UNSPECIFIED TYPE: Chronic | ICD-10-CM

## 2022-04-30 DIAGNOSIS — E66.01 OBESITY, CLASS III, BMI 40-49.9 (MORBID OBESITY) (HCC): ICD-10-CM

## 2022-04-30 LAB
EST. AVERAGE GLUCOSE BLD GHB EST-MCNC: 126 MG/DL
GLUCOSE P FAST SERPL-MCNC: 103 MG/DL (ref 65–99)
HBA1C MFR BLD: 6 %
TSH SERPL DL<=0.05 MIU/L-ACNC: 3.94 UIU/ML (ref 0.45–4.5)

## 2022-04-30 PROCEDURE — 82947 ASSAY GLUCOSE BLOOD QUANT: CPT

## 2022-04-30 PROCEDURE — 84443 ASSAY THYROID STIM HORMONE: CPT

## 2022-04-30 PROCEDURE — 36415 COLL VENOUS BLD VENIPUNCTURE: CPT

## 2022-04-30 PROCEDURE — 83036 HEMOGLOBIN GLYCOSYLATED A1C: CPT

## 2022-05-05 ENCOUNTER — OFFICE VISIT (OUTPATIENT)
Dept: INTERNAL MEDICINE CLINIC | Age: 57
End: 2022-05-05
Payer: COMMERCIAL

## 2022-05-05 VITALS — BODY MASS INDEX: 42.7 KG/M2 | HEIGHT: 71 IN | WEIGHT: 305 LBS

## 2022-05-05 DIAGNOSIS — E78.01 FAMILIAL HYPERCHOLESTEROLEMIA: Chronic | ICD-10-CM

## 2022-05-05 DIAGNOSIS — R73.03 PREDIABETES: ICD-10-CM

## 2022-05-05 DIAGNOSIS — I10 BENIGN ESSENTIAL HTN: Chronic | ICD-10-CM

## 2022-05-05 DIAGNOSIS — E66.01 OBESITY, CLASS III, BMI 40-49.9 (MORBID OBESITY) (HCC): ICD-10-CM

## 2022-05-05 DIAGNOSIS — Z12.11 SCREEN FOR COLON CANCER: Primary | ICD-10-CM

## 2022-05-05 DIAGNOSIS — J45.20 MILD INTERMITTENT ASTHMA WITHOUT COMPLICATION: Chronic | ICD-10-CM

## 2022-05-05 DIAGNOSIS — E03.9 HYPOTHYROIDISM, UNSPECIFIED TYPE: Chronic | ICD-10-CM

## 2022-05-05 PROCEDURE — 1036F TOBACCO NON-USER: CPT | Performed by: INTERNAL MEDICINE

## 2022-05-05 PROCEDURE — 3008F BODY MASS INDEX DOCD: CPT | Performed by: INTERNAL MEDICINE

## 2022-05-05 PROCEDURE — 3725F SCREEN DEPRESSION PERFORMED: CPT | Performed by: INTERNAL MEDICINE

## 2022-05-05 PROCEDURE — 99214 OFFICE O/P EST MOD 30 MIN: CPT | Performed by: INTERNAL MEDICINE

## 2022-05-05 RX ORDER — METFORMIN HYDROCHLORIDE 500 MG/1
500 TABLET, EXTENDED RELEASE ORAL
Qty: 90 TABLET | Refills: 1 | Status: SHIPPED | OUTPATIENT
Start: 2022-05-05

## 2022-05-05 NOTE — PROGRESS NOTES
Assessment/Plan:     Diagnoses and all orders for this visit:    Screen for colon cancer  -     Cologuard    Benign essential HTN  Hypertension is controlled  Familial hypercholesterolemia  Follow-up for high  cholesterol  Hypothyroidism, unspecified type  TSH  Mild intermittent asthma without complication  Increase the Flovent and rescue inhaler Zyrtec 10 mg once a day  Obesity, Class III, BMI 40-49 9 (morbid obesity) (HCC)    Prediabetes  -     metFORMIN (GLUCOPHAGE-XR) 500 mg 24 hr tablet; Take 1 tablet (500 mg total) by mouth daily with dinner  -     TSH, 3rd generation; Future  -     Lipid panel; Future  -     Microalbumin / creatinine urine ratio        BMI Counseling: Body mass index is 42 54 kg/m²  The BMI is above normal  Nutrition recommendations include decreasing portion sizes, encouraging healthy choices of fruits and vegetables and moderation in carbohydrate intake  Exercise recommendations include moderate physical activity 150 minutes/week  Rationale for BMI follow-up plan is due to patient being overweight or obese  Depression Screening and Follow-up Plan: Patient was screened for depression during today's encounter  They screened negative with a PHQ-2 score of 0  M*Drift[employer] software was used to dictate this note  It may contain errors with dictating incorrect words or incorrect spelling  Please contact the provider directly with any questions  Subjective:   Chief Complaint   Patient presents with    Virtual Regular Visit     [phone]--2 mo f/u google duo     Hypertension        Patient ID: Monica Diehl is a 49 y o  male  [de-identified] years young gentleman with history of hypothyroidism, asthma, high blood pressure, morbid obesity, prediabetic virtual visit for follow-up doing well except his asthma is acting up he is using the Flovent once a day and also he is using rescue inhaler maybe 2 to 3 times a day    He is denying any fever or chills or cough he does have some runny nose sneezing and the symptoms of allergies I told him to take Zyrtec 10 mg once a day increase his the Flovent to 2 times a day and continue with his rescue inhaler as needed he understand and if the symptoms get worst he need to be seen  Hypertension is very well controlled  Hypothyroidism TSH is normal  Pre diabetic hemoglobin A1c 6 0 and fasting sugar is 103 with his underlying hypertension hypercholesterolemia and morbid obesity I discussed with him regarding the use of metformin 500 mg once a day and then we will adjust the dose accordingly with his the follow-up hemoglobin A1c and fasting blood sugar  He does not smoke      The following portions of the patient's history were reviewed and updated as appropriate: allergies, current medications, past family history, past medical history, past social history, past surgical history and problem list     Review of Systems   Constitutional: Positive for fatigue  Negative for appetite change and fever  HENT: Negative for congestion, ear pain, hearing loss, nosebleeds, sneezing, tinnitus and voice change  Eyes: Negative for pain, discharge and redness  Respiratory: Positive for shortness of breath (On excertion, using resque inhaller more)  Negative for cough, chest tightness and wheezing  Cardiovascular: Negative for chest pain, palpitations and leg swelling  Gastrointestinal: Negative for abdominal pain, blood in stool, constipation, diarrhea, nausea and vomiting  Genitourinary: Negative for difficulty urinating, dysuria, hematuria and urgency  Musculoskeletal: Negative for arthralgias, back pain, gait problem and joint swelling  Skin: Negative for rash and wound  Allergic/Immunologic: Negative for environmental allergies  Neurological: Negative for dizziness, tremors, seizures, weakness, light-headedness and numbness  Hematological: Negative for adenopathy  Does not bruise/bleed easily     Psychiatric/Behavioral: Negative for behavioral problems and confusion  The patient is not nervous/anxious  Past Medical History:   Diagnosis Date    Asthma     Disease of thyroid gland     Hematoma of left axilla     Last Assessed:9/23/2015    Hypertension     Hyperthyroidism     Hypothyroidism     Toxic multinodular goiter     Last Assessed:12/26/2013         Current Outpatient Medications:     albuterol (ProAir HFA) 90 mcg/act inhaler, Inhale 1-2 puffs daily, Disp: 8 5 g, Rfl: 1    atorvastatin (LIPITOR) 20 mg tablet, Take 1 tablet (20 mg total) by mouth daily, Disp: 90 tablet, Rfl: 1    Fluticasone Propionate, Inhal, (Flovent Diskus) 250 MCG/BLIST AEPB, Inhale 1 puff daily, Disp: 60 blister, Rfl: 2    levothyroxine 200 mcg tablet, Take 1 tablet (200 mcg total) by mouth daily, Disp: 90 tablet, Rfl: 1    levothyroxine 75 mcg tablet, Take 1 tablet (75 mcg total) by mouth daily, Disp: 90 tablet, Rfl: 1    losartan (COZAAR) 50 mg tablet, Take 1 tablet (50 mg total) by mouth daily, Disp: 90 tablet, Rfl: 1    potassium chloride (K-DUR,KLOR-CON) 20 mEq tablet, Take 1 tablet (20 mEq total) by mouth daily, Disp: 30 tablet, Rfl: 0    Allergies   Allergen Reactions    Cat Hair Extract      Eye irritation       Social History   Past Surgical History:   Procedure Laterality Date    KNEE ARTHROSCOPY Left     THYROID SURGERY      Ablation     Family History   Problem Relation Age of Onset    Diabetes Mother         mellitus    Hypothyroidism Sister     Cancer Maternal Grandmother     Parkinsonism Father     Heart disease Neg Hx     Stroke Neg Hx        Objective:  Ht 5' 11" (1 803 m)   Wt (!) 138 kg (305 lb)   BMI 42 54 kg/m²        Physical Exam  Constitutional:       Appearance: He is well-developed  HENT:      Head: Normocephalic  Musculoskeletal:      Cervical back: Normal range of motion  Skin:     General: Skin is warm  Neurological:      General: No focal deficit present        Mental Status: He is alert and oriented to person, place, and time     Psychiatric:         Behavior: Behavior normal

## 2022-06-09 ENCOUNTER — VBI (OUTPATIENT)
Dept: ADMINISTRATIVE | Facility: OTHER | Age: 57
End: 2022-06-09

## 2022-08-04 DIAGNOSIS — E03.9 HYPOTHYROIDISM, UNSPECIFIED TYPE: ICD-10-CM

## 2022-08-04 RX ORDER — LEVOTHYROXINE SODIUM 0.07 MG/1
75 TABLET ORAL DAILY
Qty: 90 TABLET | Refills: 1 | Status: SHIPPED | OUTPATIENT
Start: 2022-08-04

## 2022-09-03 ENCOUNTER — APPOINTMENT (OUTPATIENT)
Dept: LAB | Facility: MEDICAL CENTER | Age: 57
End: 2022-09-03
Payer: COMMERCIAL

## 2022-09-03 DIAGNOSIS — R73.03 PREDIABETES: ICD-10-CM

## 2022-09-03 LAB
CHOLEST SERPL-MCNC: 136 MG/DL
CREAT UR-MCNC: 290 MG/DL
HDLC SERPL-MCNC: 36 MG/DL
LDLC SERPL CALC-MCNC: 77 MG/DL (ref 0–100)
MICROALBUMIN UR-MCNC: 36.7 MG/L (ref 0–20)
MICROALBUMIN/CREAT 24H UR: 13 MG/G CREATININE (ref 0–30)
NONHDLC SERPL-MCNC: 100 MG/DL
TRIGL SERPL-MCNC: 114 MG/DL
TSH SERPL DL<=0.05 MIU/L-ACNC: 4.08 UIU/ML (ref 0.45–4.5)

## 2022-09-03 PROCEDURE — 84443 ASSAY THYROID STIM HORMONE: CPT

## 2022-09-03 PROCEDURE — 80061 LIPID PANEL: CPT

## 2022-09-03 PROCEDURE — 36415 COLL VENOUS BLD VENIPUNCTURE: CPT

## 2022-09-08 DIAGNOSIS — E03.9 HYPOTHYROIDISM, UNSPECIFIED TYPE: ICD-10-CM

## 2022-09-08 DIAGNOSIS — E78.00 HYPERCHOLESTEROLEMIA: ICD-10-CM

## 2022-09-08 DIAGNOSIS — I10 ESSENTIAL HYPERTENSION, BENIGN: ICD-10-CM

## 2022-09-08 RX ORDER — LOSARTAN POTASSIUM 50 MG/1
50 TABLET ORAL DAILY
Qty: 90 TABLET | Refills: 1 | Status: SHIPPED | OUTPATIENT
Start: 2022-09-08

## 2022-09-08 RX ORDER — LEVOTHYROXINE SODIUM 0.2 MG/1
200 TABLET ORAL DAILY
Qty: 90 TABLET | Refills: 1 | Status: SHIPPED | OUTPATIENT
Start: 2022-09-08

## 2022-09-08 RX ORDER — ATORVASTATIN CALCIUM 20 MG/1
20 TABLET, FILM COATED ORAL DAILY
Qty: 90 TABLET | Refills: 1 | Status: SHIPPED | OUTPATIENT
Start: 2022-09-08

## 2022-10-20 ENCOUNTER — VBI (OUTPATIENT)
Dept: ADMINISTRATIVE | Facility: OTHER | Age: 57
End: 2022-10-20

## 2022-10-25 ENCOUNTER — RA CDI HCC (OUTPATIENT)
Dept: OTHER | Facility: HOSPITAL | Age: 57
End: 2022-10-25

## 2022-10-25 NOTE — PROGRESS NOTES
[facility]  coding opportunities       Chart reviewed, no opportunity found: CHART REVIEWED, NO OPPORTUNITY FOUND        Patients Insurance        Commercial Insurance: [address] [place] [address]

## 2022-11-01 ENCOUNTER — OFFICE VISIT (OUTPATIENT)
Dept: INTERNAL MEDICINE CLINIC | Age: 57
End: 2022-11-01

## 2022-11-01 VITALS
OXYGEN SATURATION: 97 % | DIASTOLIC BLOOD PRESSURE: 88 MMHG | HEART RATE: 83 BPM | SYSTOLIC BLOOD PRESSURE: 140 MMHG | HEIGHT: 71 IN | BODY MASS INDEX: 42.98 KG/M2 | WEIGHT: 307 LBS | TEMPERATURE: 98.5 F

## 2022-11-01 DIAGNOSIS — E03.9 HYPOTHYROIDISM, UNSPECIFIED TYPE: Chronic | ICD-10-CM

## 2022-11-01 DIAGNOSIS — J45.20 MILD INTERMITTENT ASTHMA WITHOUT COMPLICATION: Chronic | ICD-10-CM

## 2022-11-01 DIAGNOSIS — R73.03 PREDIABETES: Primary | ICD-10-CM

## 2022-11-01 DIAGNOSIS — L03.115 CELLULITIS OF RIGHT LOWER EXTREMITY: ICD-10-CM

## 2022-11-01 LAB — SL AMB POCT HEMOGLOBIN AIC: 6.2 (ref ?–6.5)

## 2022-11-01 RX ORDER — CEPHALEXIN 500 MG/1
500 CAPSULE ORAL EVERY 8 HOURS SCHEDULED
Qty: 21 CAPSULE | Refills: 0 | Status: SHIPPED | OUTPATIENT
Start: 2022-11-01 | End: 2022-11-08

## 2022-11-01 NOTE — PROGRESS NOTES
Assessment/Plan:     Diagnoses and all orders for this visit:    Prediabetes  -     Glucose, fasting; Future  -     Hemoglobin A1C; Future    Hypothyroidism, unspecified type  -     TSH, 3rd generation with Free T4 reflex; Future    Mild intermittent asthma without complication  Asthma is better recently he had an upper respiratory tract infection  Cellulitis of right lower extremity  -     cephalexin (KEFLEX) 500 mg capsule; Take 1 capsule (500 mg total) by mouth every 8 (eight) hours for 7 days             M*Modal software was used to dictate this note  It may contain errors with dictating incorrect words or incorrect spelling  Please contact the provider directly with any questions  Subjective:   Chief Complaint   Patient presents with   • Follow-up     4 month- labs done 9/3/22- no flu shot-  Pt has cologuard kit  Pt made aware test kit may  and to complete asap   • Leg Problem     Pt states he has wounds developing on RIGHT LEG- Pt has had issue in past   Area is draining  Pt states he wears compression stockings  Patient ID: Radha Asencio is a 56 y o  male  HPI   This is a very pleasant 56 years young gentleman who is here today for the regular follow-up he has a problem with the hypothyroidism hypercholesterolemia hypertension and obesity with chronic venous insufficiency he also have the history of type 2 diabetes with the hemoglobin A1c over 6 today is 6 2  Apparently he was not taking his the metformin regularly    Hypertension and hypothyroidism are very well controlled lipid panel is excellent except for HDL cholesterol which is low  Other problem today is that he has the the 2 ulcers on his right leg laterally these are looks like blisters similar problem before healed with antibiotic treatment he was advised to go to the dermatologist but as the ulcers healed he did not take any by antibiotic discussed does not looks like the stasis dermatitis ulcers to me it looks like blisters which opened up by the fraction of his compression stockings and the boots  Will follow him up in about 1 week and see how he is doing with these ulcers and then will go from there I may need to do the biopsy for further evaluation if the ulcers are not getting any better and getting bigger or if there is any new ulcers are seen  The following portions of the patient's history were reviewed and updated as appropriate: allergies, current medications, past family history, past medical history, past social history, past surgical history and problem list     Review of Systems   Constitutional: Negative for appetite change, fatigue and fever  HENT: Negative for congestion, ear pain, hearing loss, nosebleeds, sneezing, tinnitus and voice change  Eyes: Negative for pain, discharge and redness  Respiratory: Negative for cough, chest tightness and wheezing  Cardiovascular: Negative for chest pain, palpitations and leg swelling  Gastrointestinal: Negative for abdominal pain, blood in stool, constipation, diarrhea, nausea and vomiting  Genitourinary: Negative for difficulty urinating, dysuria, hematuria and urgency  Musculoskeletal: Negative for arthralgias, back pain, gait problem and joint swelling  Skin: Positive for rash (Right the leg open ulcers patient started with the blister that opened up and then become ulceration and infection similar problems happen before and was treated with antibiotic )  Negative for wound  Allergic/Immunologic: Negative for environmental allergies  Neurological: Negative for dizziness, tremors, seizures, weakness, light-headedness and numbness  Hematological: Negative for adenopathy  Does not bruise/bleed easily  Psychiatric/Behavioral: Negative for behavioral problems and confusion  The patient is not nervous/anxious            Past Medical History:   Diagnosis Date   • Asthma    • Disease of thyroid gland    • Hematoma of left axilla     Last Assessed:9/23/2015   • Hypertension    • Hyperthyroidism    • Hypothyroidism    • Toxic multinodular goiter     Last Assessed:12/26/2013         Current Outpatient Medications:   •  atorvastatin (LIPITOR) 20 mg tablet, Take 1 tablet (20 mg total) by mouth daily, Disp: 90 tablet, Rfl: 1  •  Fluticasone Propionate, Inhal, (Flovent Diskus) 250 MCG/BLIST AEPB, Inhale 1 puff daily, Disp: 60 blister, Rfl: 2  •  levothyroxine 200 mcg tablet, Take 1 tablet (200 mcg total) by mouth daily, Disp: 90 tablet, Rfl: 1  •  levothyroxine 75 mcg tablet, Take 1 tablet (75 mcg total) by mouth daily, Disp: 90 tablet, Rfl: 1  •  losartan (COZAAR) 50 mg tablet, Take 1 tablet (50 mg total) by mouth daily, Disp: 90 tablet, Rfl: 1  •  metFORMIN (GLUCOPHAGE-XR) 500 mg 24 hr tablet, Take 1 tablet (500 mg total) by mouth daily with dinner, Disp: 90 tablet, Rfl: 1  •  potassium chloride (K-DUR,KLOR-CON) 20 mEq tablet, Take 1 tablet (20 mEq total) by mouth daily, Disp: 30 tablet, Rfl: 0    Allergies   Allergen Reactions   • Cat Hair Extract      Eye irritation       Social History   Past Surgical History:   Procedure Laterality Date   • KNEE ARTHROSCOPY Left    • THYROID SURGERY      Ablation     Family History   Problem Relation Age of Onset   • Diabetes Mother         mellitus   • Hypothyroidism Sister    • Cancer Maternal Grandmother    • Parkinsonism Father    • Heart disease Neg Hx    • Stroke Neg Hx        Objective:  /88 (BP Location: Left arm, Patient Position: Sitting, Cuff Size: Large)   Pulse 83   Temp 98 5 °F (36 9 °C) (Temporal)   Ht 5' 11" (1 803 m)   Wt (!) 139 kg (307 lb)   SpO2 97% Comment: room air  BMI 42 82 kg/m²        Physical Exam  Constitutional:       Appearance: He is well-developed  He is obese  HENT:      Right Ear: External ear normal    Eyes:      Conjunctiva/sclera: Conjunctivae normal       Pupils: Pupils are equal, round, and reactive to light  Neck:      Thyroid: No thyromegaly  Vascular: No JVD     Cardiovascular: Rate and Rhythm: Normal rate and regular rhythm  Heart sounds: Normal heart sounds  Pulmonary:      Breath sounds: Normal breath sounds  Abdominal:      General: Bowel sounds are normal       Palpations: Abdomen is soft  Musculoskeletal:         General: Normal range of motion  Cervical back: Normal range of motion  Lymphadenopathy:      Cervical: No cervical adenopathy  Skin:     General: Skin is dry  Comments: Chronic venous insufficiency bilaterally with hyperpigmentation and very dry skin   Neurological:      Mental Status: He is alert and oriented to person, place, and time  Deep Tendon Reflexes: Reflexes are normal and symmetric     Psychiatric:         Behavior: Behavior normal

## 2022-11-10 ENCOUNTER — OFFICE VISIT (OUTPATIENT)
Dept: INTERNAL MEDICINE CLINIC | Age: 57
End: 2022-11-10

## 2022-11-10 VITALS
BODY MASS INDEX: 43.26 KG/M2 | OXYGEN SATURATION: 97 % | SYSTOLIC BLOOD PRESSURE: 152 MMHG | WEIGHT: 309 LBS | HEIGHT: 71 IN | HEART RATE: 73 BPM | DIASTOLIC BLOOD PRESSURE: 90 MMHG | TEMPERATURE: 98.6 F

## 2022-11-10 DIAGNOSIS — S81.801S WOUND OF RIGHT LOWER EXTREMITY, SEQUELA: ICD-10-CM

## 2022-11-10 DIAGNOSIS — L03.115 CELLULITIS OF RIGHT LEG: Primary | ICD-10-CM

## 2022-11-10 NOTE — PROGRESS NOTES
Assessment/Plan:     Diagnoses and all orders for this visit:    Cellulitis of right leg  Improved  Wound of right lower extremity, sequela      Healing       M*Modal software was used to dictate this note  It may contain errors with dictating incorrect words or incorrect spelling  Please contact the provider directly with any questions  Subjective:   Chief Complaint   Patient presents with   • Follow-up     1 week- Pt finished keflex, RIGHT leg is better- pt sounds wheezy  Pt states he is still coughing up phlegm  Patient ID: Lilian Hays is a 56 y o  male  HPI    The following portions of the patient's history were reviewed and updated as appropriate: allergies, current medications, past family history, past medical history, past social history, past surgical history and problem list     Review of Systems   Constitutional: Negative for activity change, fatigue and fever  HENT: Negative for congestion, sinus pain and sore throat  Eyes: Negative for discharge  Respiratory: Negative for cough and shortness of breath  Cardiovascular: Negative for chest pain and palpitations  Gastrointestinal: Negative for constipation, diarrhea and nausea  Genitourinary: Negative for hematuria and urgency  Musculoskeletal: Negative for back pain and gait problem  Skin: Positive for rash (Leg cellulitis is better and the wound is healing) and wound  Neurological: Negative for dizziness, weakness and light-headedness  Psychiatric/Behavioral: Positive for sleep disturbance  The patient is not nervous/anxious            Past Medical History:   Diagnosis Date   • Asthma    • Disease of thyroid gland    • Hematoma of left axilla     Last Assessed:9/23/2015   • Hypertension    • Hyperthyroidism    • Hypothyroidism    • Toxic multinodular goiter     Last Assessed:12/26/2013         Current Outpatient Medications:   •  atorvastatin (LIPITOR) 20 mg tablet, Take 1 tablet (20 mg total) by mouth daily, Disp: 90 tablet, Rfl: 1  •  Fluticasone Propionate, Inhal, (Flovent Diskus) 250 MCG/BLIST AEPB, Inhale 1 puff daily, Disp: 60 blister, Rfl: 2  •  levothyroxine 200 mcg tablet, Take 1 tablet (200 mcg total) by mouth daily, Disp: 90 tablet, Rfl: 1  •  levothyroxine 75 mcg tablet, Take 1 tablet (75 mcg total) by mouth daily, Disp: 90 tablet, Rfl: 1  •  losartan (COZAAR) 50 mg tablet, Take 1 tablet (50 mg total) by mouth daily, Disp: 90 tablet, Rfl: 1  •  metFORMIN (GLUCOPHAGE-XR) 500 mg 24 hr tablet, Take 1 tablet (500 mg total) by mouth daily with dinner, Disp: 90 tablet, Rfl: 1  •  potassium chloride (K-DUR,KLOR-CON) 20 mEq tablet, Take 1 tablet (20 mEq total) by mouth daily, Disp: 30 tablet, Rfl: 0    Allergies   Allergen Reactions   • Cat Hair Extract      Eye irritation       Social History   Past Surgical History:   Procedure Laterality Date   • KNEE ARTHROSCOPY Left    • THYROID SURGERY      Ablation     Family History   Problem Relation Age of Onset   • Diabetes Mother         mellitus   • Hypothyroidism Sister    • Cancer Maternal Grandmother    • Parkinsonism Father    • Heart disease Neg Hx    • Stroke Neg Hx        Objective:  /90 (BP Location: Left arm, Patient Position: Sitting, Cuff Size: Large)   Pulse 73   Temp 98 6 °F (37 °C) (Temporal)   Ht 5' 11" (1 803 m)   Wt (!) 140 kg (309 lb)   SpO2 97% Comment: room air  BMI 43 10 kg/m²        Physical Exam  Constitutional:       Appearance: He is well-developed  Cardiovascular:      Rate and Rhythm: Normal rate and regular rhythm  Heart sounds: Normal heart sounds  Pulmonary:      Effort: Pulmonary effort is normal       Breath sounds: Normal breath sounds  Skin:     General: Skin is warm and dry  Findings: Rash and wound present

## 2022-11-28 ENCOUNTER — TELEPHONE (OUTPATIENT)
Dept: INTERNAL MEDICINE CLINIC | Age: 57
End: 2022-11-28

## 2022-11-28 DIAGNOSIS — S81.802D WOUND OF LEFT LOWER EXTREMITY, SUBSEQUENT ENCOUNTER: ICD-10-CM

## 2022-11-28 DIAGNOSIS — L03.115 CELLULITIS OF RIGHT LEG: Primary | ICD-10-CM

## 2022-11-28 NOTE — TELEPHONE ENCOUNTER
Pt called the office and asked if you would please place an order for him to see Dermatology for the infection in his leg  The one in the system was discontinued

## 2022-12-01 ENCOUNTER — OFFICE VISIT (OUTPATIENT)
Dept: WOUND CARE | Facility: CLINIC | Age: 57
End: 2022-12-01

## 2022-12-01 VITALS
TEMPERATURE: 96.8 F | HEART RATE: 68 BPM | RESPIRATION RATE: 18 BRPM | SYSTOLIC BLOOD PRESSURE: 158 MMHG | DIASTOLIC BLOOD PRESSURE: 84 MMHG

## 2022-12-01 DIAGNOSIS — I87.2 VENOUS ULCER OF LEFT LOWER EXTREMITY WITHOUT VARICOSE VEINS (HCC): ICD-10-CM

## 2022-12-01 DIAGNOSIS — E66.01 OBESITY, CLASS III, BMI 40-49.9 (MORBID OBESITY) (HCC): ICD-10-CM

## 2022-12-01 DIAGNOSIS — I87.2 VENOUS ULCER OF RIGHT LOWER EXTREMITY WITHOUT VARICOSE VEINS (HCC): Primary | ICD-10-CM

## 2022-12-01 DIAGNOSIS — R60.0 BILATERAL EDEMA OF LOWER EXTREMITY: ICD-10-CM

## 2022-12-01 DIAGNOSIS — L97.921 NON-PRESSURE ULCER OF LEFT LOWER EXTREMITY, LIMITED TO BREAKDOWN OF SKIN (HCC): ICD-10-CM

## 2022-12-01 DIAGNOSIS — L97.919 VENOUS ULCER OF RIGHT LOWER EXTREMITY WITHOUT VARICOSE VEINS (HCC): Primary | ICD-10-CM

## 2022-12-01 DIAGNOSIS — R73.03 PREDIABETES: ICD-10-CM

## 2022-12-01 DIAGNOSIS — L97.212 NON-PRESSURE CHRONIC ULCER OF RIGHT CALF WITH FAT LAYER EXPOSED (HCC): ICD-10-CM

## 2022-12-01 DIAGNOSIS — L97.929 VENOUS ULCER OF LEFT LOWER EXTREMITY WITHOUT VARICOSE VEINS (HCC): ICD-10-CM

## 2022-12-01 RX ORDER — LIDOCAINE 40 MG/G
CREAM TOPICAL ONCE
Status: COMPLETED | OUTPATIENT
Start: 2022-12-01 | End: 2022-12-01

## 2022-12-01 RX ADMIN — LIDOCAINE: 40 CREAM TOPICAL at 09:33

## 2022-12-01 NOTE — PROGRESS NOTES
Patient ID: Sandra Orantes is a 56 y o  male Date of Birth 1965       Chief Complaint   Patient presents with   • New Patient Visit     Bilateral lower extremity wounds       Allergies:  Cat hair extract    Diagnosis:      Diagnosis ICD-10-CM Associated Orders   1  Venous ulcer of right lower extremity without varicose veins ([facility])  I87 2 lidocaine (LMX) 4 % cream    L97 919 Wound cleansing and dressings     Debridement      2  Venous ulcer of left lower extremity without varicose veins ([facility])  I87 2 lidocaine (LMX) 4 % cream    C43 414 Cast Application     Wound cleansing and dressings      3  Non-pressure chronic ulcer of right calf with fat layer exposed ([facility] [address] )  L97 212       4  Non-pressure ulcer of left lower extremity, limited to breakdown of skin ([facility] [address] )  L97 921       5  Bilateral edema of lower extremity  R60 0       6  Obesity, Class III, BMI 40-49 9 (morbid obesity) ([facility])  E66 01       7  Prediabetes  R73 03               Assessment & Plan:  Multiple venous stasis ulcers of both lower extremities with significant edema  Selective debridement of ulcers on the right lateral calf  Optilock to the right lateral calf  Silver alginate to the smaller ulcers on the left  Unna boot bilaterally  RN wrap change on Monday and then visit one week  Low sodium diet  Elevation of legs  Call if any problems with wraps  History of pre diabetes with last A1c of 6 2  On metformin  A1C results reviewed with the patient today  Subjective:   12/1/22:  1st visit for this 57-year-old male referred to the wound center because of multiple ulcerations of both lower extremities  Patient states that he has had these for 2-3 months and most of then start as blisters  Some he believe started as irritation from is toe all work boot  However, he also has ulcerations on the calfs above the work boot  He does have swelling of both lower extremities    The patient was treated for cellulitis by his primary care physician  Last time he had any ulcers on his leg was approximately one year ago  He does wear compression stockings but since his skin is broken down, he has not worn them        The following portions of the patient's history were reviewed and updated as appropriate:   Patient Active Problem List   Diagnosis   • Benign essential HTN   • HLD (hyperlipidemia)   • Hypothyroid   • Asthma   • Primary osteoarthritis of both knees   • Obesity, Class III, BMI 40-49 9 (morbid obesity) ([facility] [address] )   • Cellulitis of right leg   • Leg wound, right   • Prediabetes   • Venous ulcer of left lower extremity without varicose veins ([facility])     Past Medical History:   Diagnosis Date   • Asthma    • Disease of thyroid gland    • Hematoma of left axilla     Last Assessed:9/23/2015   • Hypertension    • Hyperthyroidism    • Hypothyroidism    • Toxic multinodular goiter     Last Assessed:12/26/2013     Past Surgical History:   Procedure Laterality Date   • KNEE ARTHROSCOPY Left    • THYROID SURGERY      Ablation     Family History   Problem Relation Age of Onset   • Diabetes Mother         mellitus   • Hypothyroidism Sister    • Cancer Maternal Grandmother    • Parkinsonism Father    • Heart disease Neg Hx    • Stroke Neg Hx       Social History     Socioeconomic History   • Marital status: /Civil Union     Spouse name: None   • Number of children: None   • Years of education: None   • Highest education level: None   Occupational History   • Occupation:    Tobacco Use   • Smoking status: Former     Types: Cigarettes     Quit date: 1/1/2007     Years since quitting: 15 9   • Smokeless tobacco: Never   Vaping Use   • Vaping Use: Never used   Substance and Sexual Activity   • Alcohol use: Yes     Comment: Social drinker    • Drug use: No   • Sexual activity: Yes   Other Topics Concern   • None   Social History Narrative    Daily coffee consumption/6 cups a day    Daily cola consumption/1 can a day    Recreational activities hiking Travel History/Pt denies being out of the country during 1/2014-11/7/2014     Social Determinants of Health     Financial Resource Strain: Not on file   Food Insecurity: Not on file   Transportation Needs: Not on file   Physical Activity: Not on file   Stress: Not on file   Social Connections: Not on file   Intimate Partner Violence: Not on file   Housing Stability: Not on file        Current Outpatient Medications:   •  atorvastatin (LIPITOR) 20 mg tablet, Take 1 tablet (20 mg total) by mouth daily, Disp: 90 tablet, Rfl: 1  •  Fluticasone Propionate, Inhal, (Flovent Diskus) 250 MCG/BLIST AEPB, Inhale 1 puff daily, Disp: 60 blister, Rfl: 2  •  levothyroxine 200 mcg tablet, Take 1 tablet (200 mcg total) by mouth daily, Disp: 90 tablet, Rfl: 1  •  levothyroxine 75 mcg tablet, Take 1 tablet (75 mcg total) by mouth daily, Disp: 90 tablet, Rfl: 1  •  losartan (COZAAR) 50 mg tablet, Take 1 tablet (50 mg total) by mouth daily, Disp: 90 tablet, Rfl: 1  •  metFORMIN (GLUCOPHAGE-XR) 500 mg 24 hr tablet, Take 1 tablet (500 mg total) by mouth daily with dinner, Disp: 90 tablet, Rfl: 1  •  potassium chloride (K-DUR,KLOR-CON) 20 mEq tablet, Take 1 tablet (20 mEq total) by mouth daily, Disp: 30 tablet, Rfl: 0  No current facility-administered medications for this visit  Review of Systems   Constitutional: Negative for appetite change, chills, fatigue, fever and unexpected weight change  HENT: Negative for congestion, hearing loss, postnasal drip and sinus pressure  Eyes: Negative for discharge and visual disturbance  Respiratory: Negative for cough and shortness of breath (Denies)  Cardiovascular: Positive for leg swelling ( bilateral)  Negative for chest pain and palpitations  Gastrointestinal: Negative for abdominal pain, blood in stool, constipation, diarrhea and nausea  Endocrine: Negative  Genitourinary: Negative for difficulty urinating, dysuria and urgency     Musculoskeletal: Negative for back pain and gait problem  Skin: Positive for wound ( both lower extremities)  Negative for rash  Allergic/Immunologic: Negative  Neurological: Negative for dizziness, tremors, seizures, weakness, numbness and headaches  Hematological: Does not bruise/bleed easily  Psychiatric/Behavioral: Negative  Negative for dysphoric mood  The patient is not nervous/anxious  Objective:  /84   Pulse 68   Temp (!) 96 8 °F (36 °C)   Resp 18   Pain Score:   7     Physical Exam  Vitals and nursing note reviewed  Constitutional:       Appearance: Normal appearance  He is well-developed  He is morbidly obese  HENT:      Head: Normocephalic and atraumatic  Right Ear: External ear normal       Left Ear: External ear normal    Eyes:      General: Lids are normal          Right eye: No discharge  Left eye: No discharge  Conjunctiva/sclera: Conjunctivae normal    Cardiovascular:      Rate and Rhythm: Normal rate and regular rhythm  Pulses:           Dorsalis pedis pulses are 1+ on the right side and 1+ on the left side  Posterior tibial pulses are 1+ on the right side and 1+ on the left side  Heart sounds: Normal heart sounds  No murmur heard  No friction rub  No gallop  Comments: Difficult to palpate pulses due to edema of feet  Pulmonary:      Effort: Pulmonary effort is normal  No accessory muscle usage or respiratory distress  Breath sounds: Examination of the right-upper field reveals wheezing  Examination of the left-upper field reveals wheezing  Examination of the right-middle field reveals wheezing  Examination of the left-middle field reveals wheezing  Examination of the right-lower field reveals wheezing  Examination of the left-lower field reveals wheezing  Wheezing present  Comments: In spur tore E and expiratory wheezes bilaterally  Abdominal:      General: Abdomen is protuberant        Comments: Difficult to assess abdomen due to significant protuberance  Musculoskeletal:      Cervical back: Neck supple  Right lower leg: 3+ Edema present  Left lower leg: 3+ Edema present  Lymphadenopathy:      Cervical: No cervical adenopathy  Skin:     General: Skin is warm and dry  Findings: Erythema (Slight) and wound present  Comments: Multiple venous stasis ulcers of the right lateral calf  Slough in the base  Partial thickness, small ulcers on the left calf  Serous drainage  Mild stasis changes  Areas of hyperpigmentation  Bilateral lower extremity edema  Neurological:      Mental Status: He is alert and oriented to person, place, and time  Gait: Gait is intact  Psychiatric:         Attention and Perception: Attention normal          Mood and Affect: Mood and affect normal          Speech: Speech normal          Behavior: Behavior is cooperative  Cognition and Memory: Cognition normal          Wound 12/01/22 Venous Ulcer Leg Right;Lateral (Active)   Wound Image   12/01/22 0829   Wound Description Epithelialization;Granulation tissue; Yellow;Pink;Beefy red 12/01/22 0832   Wound Length (cm) 8 cm 12/01/22 0832   Wound Width (cm) 5 cm 12/01/22 0832   Wound Depth (cm) 0 1 cm 12/01/22 0832   Wound Surface Area (cm^2) 40 cm^2 12/01/22 0832   Wound Volume (cm^3) 4 cm^3 12/01/22 0832   Calculated Wound Volume (cm^3) 4 cm^3 12/01/22 0832   Drainage Amount Moderate 12/01/22 0832   Drainage Description Serous 12/01/22 0832   Patient Tolerance Tolerated well 12/01/22 0832   Dressing Status Removed 12/01/22 0832       Wound 12/01/22 Venous Ulcer Leg Left (Active)   Wound Image   12/01/22 0829   Wound Description Granulation tissue; Beefy red 12/01/22 0831   Sada-wound Assessment Dry;Edema;Scaly 12/01/22 0831   Wound Length (cm) 0 9 cm 12/01/22 0831   Wound Width (cm) 0 6 cm 12/01/22 0831   Wound Depth (cm) 0 1 cm 12/01/22 0831   Wound Surface Area (cm^2) 0 54 cm^2 12/01/22 0831   Wound Volume (cm^3) 0 054 cm^3 12/01/22 0831 Calculated Wound Volume (cm^3) 0 05 cm^3 12/01/22 0831   Drainage Amount Scant 12/01/22 0831   Drainage Description Serous 12/01/22 0831   Patient Tolerance Tolerated well 12/01/22 0831   Dressing Status Removed 12/01/22 0831       Wound 12/01/22 Venous Ulcer Leg Left;Proximal;Lower (Active)   Wound Image   12/01/22 0829   Wound Description Pink;Granulation tissue 12/01/22 0830   Sada-wound Assessment Dry;Edema;Scaly 12/01/22 0830   Wound Length (cm) 0 3 cm 12/01/22 0830   Wound Width (cm) 0 3 cm 12/01/22 0830   Wound Depth (cm) 0 1 cm 12/01/22 0830   Wound Surface Area (cm^2) 0 09 cm^2 12/01/22 0830   Wound Volume (cm^3) 0 009 cm^3 12/01/22 0830   Calculated Wound Volume (cm^3) 0 01 cm^3 12/01/22 0830   Drainage Amount Scant 12/01/22 0830   Drainage Description Serous 12/01/22 0830   Patient Tolerance Tolerated well 12/01/22 0830   Dressing Status Removed 12/01/22 0830                            Debridement   Wound 12/01/22 Venous Ulcer Leg Right;Lateral    Universal Protocol:  Consent: Verbal consent obtained  Written consent obtained  Consent given by: patient  Time out: Immediately prior to procedure a "time out" was called to verify the correct patient, procedure, equipment, support staff and site/side marked as required    Patient understanding: patient states understanding of the procedure being performed  Patient identity confirmed: verbally with patient      Performed by: physician  Debridement type: selective  Pain control: lidocaine 4%  Post-debridement measurements  Length (cm): 8  Width (cm): 5  Depth (cm): 0 1  Percent debrided: 60%  Surface Area (cm^2): 40  Area debrided (cm^2): 24  Volume (cm^3): 4  Devitalized tissue debrided: fibrin and slough  Instrument(s) utilized: blade  Bleeding: small  Hemostasis obtained with: pressure  Procedural pain (0-10): 4  Post-procedural pain: 2   Response to treatment: procedure was tolerated well                 Wound Instructions:  Orders Placed This Encounter Procedures   • Cast Application     This order was created via procedure documentation   • Wound cleansing and dressings     Wash your hands with soap and water  Remove old dressing, discard into plastic bag and place in trash  Cleanse the wound with mild soap and water prior to applying a clean dressing  Do not use tissue or cotton balls  Do not scrub the wound  Pat dry using gauze  Shower NO   Left Leg Wounds:  Apply skin protectant to skin surrounding wound  Apply silver alginate to the wound  Cover with ABD  Secure with kerlix and tape  Unna boot applied   Change dressing once a week at the [address] [facility],[address]    Right Leg Wounds  Apply skin protectant to skin surrounding wound  Apply silver alginate to the wound  Cover with optilock  Secure with kerlix and tape  Unna boot applied   Change dressing once a week at the [address] 3-4 servings of protein daily  Nurse visit on Monday 12/5/22  Follow up with Wound Management in 1 week    Today's Treatment:  Cleansed with NSS and redressed as ordered above     Standing Status:   Future     Standing Expiration Date:   12/1/2023   • Debridement     This order was created via procedure documentation             Ric Jacobs MD, MD, CHT, CWS    Portions of the record may have been created with voice recognition software  Occasional wrong word or "sound alike" substitutions may have occurred due to the inherent limitations of voice recognition software  Read the chart carefully and recognize, using context, where substitutions have occurred

## 2022-12-01 NOTE — PROGRESS NOTES
Cast Application    Date/Time: 12/1/2022 9:34 AM  Performed by: Marizol Ortez RN  Authorized by: Juan Melendrez MD   Universal Protocol:  Consent: Verbal consent obtained  Consent given by: patient  Time out: Immediately prior to procedure a "time out" was called to verify the correct patient, procedure, equipment, support staff and site/side marked as required  Timeout called at: 12/1/2022 9:34 AM   Patient understanding: patient states understanding of the procedure being performed  Patient identity confirmed: verbally with patient      Pre-procedure details:     Sensation:  Normal  Procedure details:     Laterality:  Left    Location:  Leg    Leg:  L lower legCast type:  Unna boot      Supplies used: 1 econo paste, 1 coban, 1 tubifast   Post-procedure details:     Pain:  Improved    Sensation:  Normal    Patient tolerance of procedure: Tolerated well, no immediate complications  Comments:      UNNA BOOT wrap procedure     Before application, ARACELI and/or TBI determined to be adequate for healing and application of compression  Lower extremity washed prior to application of compression wrap  With the foot in dorsiflexed position, Unna boot was applied as per physician orders without complications or complaint of pain  The procedure was tolerated well  Toes warm & pink post application  Patient provided education & reinforced to observe toes for any discoloration, swelling or tingling and instructed when to report to the [address] [facility],[address] or to remove compression  Wound care as per providers orders, patient tolerated well

## 2022-12-01 NOTE — PATIENT INSTRUCTIONS
Orders Placed This Encounter   Procedures    Cast Application     This order was created via procedure documentation    Wound cleansing and dressings     Wash your hands with soap and water  Remove old dressing, discard into plastic bag and place in trash  Cleanse the wound with mild soap and water prior to applying a clean dressing  Do not use tissue or cotton balls  Do not scrub the wound  Pat dry using gauze  Shower NO   Left Leg Wounds:  Apply skin protectant to skin surrounding wound  Apply silver alginate to the wound  Cover with ABD  Secure with kerlix and tape  Unna boot applied   [facility] [address] [facility]    Right Leg Wounds  Apply skin protectant to skin surrounding wound  Apply silver alginate to the wound    Cover with optilock  Secure with kerlix and tape  Unna boot applied   Change dressing once a week at the [address] [facility] 3-4 servings of protein daily  Nurse visit on Monday 12/5/22  Follow up with Wound Management in 1 week    Today's Treatment:  Cleansed with NSS and redressed as ordered above     Standing Status:   Future     Standing Expiration Date:   12/1/2023

## 2022-12-05 ENCOUNTER — CLINICAL SUPPORT (OUTPATIENT)
Dept: WOUND CARE | Facility: CLINIC | Age: 57
End: 2022-12-05

## 2022-12-05 VITALS — DIASTOLIC BLOOD PRESSURE: 88 MMHG | SYSTOLIC BLOOD PRESSURE: 148 MMHG | HEART RATE: 76 BPM | TEMPERATURE: 98.2 F

## 2022-12-05 DIAGNOSIS — I87.2 VENOUS ULCER OF RIGHT LOWER EXTREMITY WITHOUT VARICOSE VEINS (HCC): Primary | ICD-10-CM

## 2022-12-05 DIAGNOSIS — L97.929 VENOUS ULCER OF LEFT LOWER EXTREMITY WITHOUT VARICOSE VEINS (HCC): ICD-10-CM

## 2022-12-05 DIAGNOSIS — L97.919 VENOUS ULCER OF RIGHT LOWER EXTREMITY WITHOUT VARICOSE VEINS (HCC): Primary | ICD-10-CM

## 2022-12-05 DIAGNOSIS — I87.2 VENOUS ULCER OF LEFT LOWER EXTREMITY WITHOUT VARICOSE VEINS (HCC): ICD-10-CM

## 2022-12-05 NOTE — PROGRESS NOTES
Cast Application    Date/Time: 12/5/2022 3:17 PM  Performed by: Hanh Garvin LPN  Authorized by: Vanna Marks MD   Universal Protocol:  Consent: Verbal consent obtained  Risks and benefits: risks, benefits and alternatives were discussed  Consent given by: patient  Timeout called at: 12/5/2022 3:17 PM   Patient understanding: patient states understanding of the procedure being performed  Procedure consent: procedure consent matches procedure scheduled  Relevant documents: relevant documents present and verified  Patient identity confirmed: verbally with patient      Pre-procedure details:     Sensation:  Normal  Procedure details:     Laterality:  Bilateral    Location:  Leg    Leg:  L lower leg and R lower legCast type:  Unna boot      Supplies:  2 layer wrap (Silver alginate, skin prep, ABD pad, optilock, encon paste, coban, paper tape, tubifast yellow)  Post-procedure details:     Pain:  Improved    Sensation:  Normal    Patient tolerance of procedure: Tolerated well, no immediate complications  Comments:      UNNA BOOT and Multiplayer wrap procedure     Before application, ARACELI and/or TBI determined to be adequate for healing and application of compression  Lower extremity washed prior to application of compression wrap  With the foot in dorsiflexed position, bilateral unna boots were  applied as per physician orders without complications or complaint of pain  The procedure was tolerated well  Toes warm & pink post application    Patient provided education & reinforced to observe toes for any discoloration, swelling or tingling and instructed when to report to the [address] [facility],[address] or to remove compression

## 2022-12-08 ENCOUNTER — OFFICE VISIT (OUTPATIENT)
Dept: WOUND CARE | Facility: CLINIC | Age: 57
End: 2022-12-08

## 2022-12-08 VITALS
SYSTOLIC BLOOD PRESSURE: 160 MMHG | HEART RATE: 72 BPM | TEMPERATURE: 97.6 F | DIASTOLIC BLOOD PRESSURE: 70 MMHG | RESPIRATION RATE: 22 BRPM

## 2022-12-08 DIAGNOSIS — I87.2 VENOUS ULCER OF LEFT LOWER EXTREMITY WITHOUT VARICOSE VEINS (HCC): ICD-10-CM

## 2022-12-08 DIAGNOSIS — E66.01 OBESITY, CLASS III, BMI 40-49.9 (MORBID OBESITY) (HCC): ICD-10-CM

## 2022-12-08 DIAGNOSIS — R60.0 BILATERAL EDEMA OF LOWER EXTREMITY: ICD-10-CM

## 2022-12-08 DIAGNOSIS — L97.921 NON-PRESSURE ULCER OF LEFT LOWER EXTREMITY, LIMITED TO BREAKDOWN OF SKIN (HCC): ICD-10-CM

## 2022-12-08 DIAGNOSIS — L97.212 NON-PRESSURE CHRONIC ULCER OF RIGHT CALF WITH FAT LAYER EXPOSED (HCC): ICD-10-CM

## 2022-12-08 DIAGNOSIS — I87.2 VENOUS ULCER OF RIGHT LOWER EXTREMITY WITHOUT VARICOSE VEINS (HCC): Primary | ICD-10-CM

## 2022-12-08 DIAGNOSIS — L97.929 VENOUS ULCER OF LEFT LOWER EXTREMITY WITHOUT VARICOSE VEINS (HCC): ICD-10-CM

## 2022-12-08 DIAGNOSIS — L97.919 VENOUS ULCER OF RIGHT LOWER EXTREMITY WITHOUT VARICOSE VEINS (HCC): Primary | ICD-10-CM

## 2022-12-08 RX ORDER — LIDOCAINE 40 MG/G
CREAM TOPICAL ONCE
Status: COMPLETED | OUTPATIENT
Start: 2022-12-08 | End: 2022-12-08

## 2022-12-08 RX ADMIN — LIDOCAINE: 40 CREAM TOPICAL at 14:35

## 2022-12-08 NOTE — PROGRESS NOTES
Patient ID: Brooks Castorena is a 56 y o  male Date of Birth 1965       Chief Complaint   Patient presents with   • Follow Up Wound Care Visit     Bilateral lower extremity wounds  Allergies:  Cat hair extract    Diagnosis:      Diagnosis ICD-10-CM Associated Orders   1  Venous ulcer of right lower extremity without varicose veins ([facility])  I87 2 lidocaine (LMX) 4 % cream    L97 919 Wound cleansing and dressings     Wound cleansing and dressings     Debridement      2  Venous ulcer of left lower extremity without varicose veins ([facility])  I87 2     L97 929       3  Non-pressure chronic ulcer of right calf with fat layer exposed ([facility] [address] )  L97 212       4  Bilateral edema of lower extremity  R60 0       5  Obesity, Class III, BMI 40-49 9 (morbid obesity) ([facility])  E66 01       6  Non-pressure ulcer of left lower extremity, limited to breakdown of skin ([facility] [address] )  L95 921               Assessment & Plan:  Chronic venous insufficiency with venous stasis ulcers of both lower extremities  Since using Unna boots from last visit, he has significantly improved  Left lower extremity ulcers have closed  Right lateral calf is much smaller  Selective debridement of the right lateral calf  Silver alginate and Unna boot  Change in 1 week  Moisturize and use compression stocking on the left lower extremity  Subjective:   12/1/22:  1st visit for this 51-year-old male referred to the wound center because of multiple ulcerations of both lower extremities  Patient states that he has had these for 2-3 months and most of then start as blisters  Some he believe started as irritation from is toe all work boot  However, he also has ulcerations on the calfs above the work boot  He does have swelling of both lower extremities  The patient was treated for cellulitis by his primary care physician  Last time he had any ulcers on his leg was approximately one year ago    He does wear compression stockings but since his skin is broken down, he has not worn them  12/8/2022: Follow-up venous stasis ulcers of both lower extremities  Edema of both lower extremities  Unna boots have been used and he has had no problem with them  He noticed that the edema is starting to decrease and the ulcers are also improving  No pain, fever or chills        The following portions of the patient's history were reviewed and updated as appropriate:   Patient Active Problem List   Diagnosis   • Benign essential HTN   • HLD (hyperlipidemia)   • Hypothyroid   • Asthma   • Primary osteoarthritis of both knees   • Obesity, Class III, BMI 40-49 9 (morbid obesity) ([facility] [address] )   • Cellulitis of right leg   • Leg wound, right   • Prediabetes   • Venous ulcer of left lower extremity without varicose veins ([facility])     Past Medical History:   Diagnosis Date   • Asthma    • Disease of thyroid gland    • Hematoma of left axilla     Last Assessed:9/23/2015   • Hypertension    • Hyperthyroidism    • Hypothyroidism    • Toxic multinodular goiter     Last Assessed:12/26/2013     Past Surgical History:   Procedure Laterality Date   • KNEE ARTHROSCOPY Left    • THYROID SURGERY      Ablation     Family History   Problem Relation Age of Onset   • Diabetes Mother         mellitus   • Hypothyroidism Sister    • Cancer Maternal Grandmother    • Parkinsonism Father    • Heart disease Neg Hx    • Stroke Neg Hx       Social History     Socioeconomic History   • Marital status: /Civil Union     Spouse name: None   • Number of children: None   • Years of education: None   • Highest education level: None   Occupational History   • Occupation:    Tobacco Use   • Smoking status: Former     Types: Cigarettes     Quit date: 1/1/2007     Years since quitting: 15 9   • Smokeless tobacco: Never   Vaping Use   • Vaping Use: Never used   Substance and Sexual Activity   • Alcohol use: Yes     Comment: Social drinker    • Drug use: No   • Sexual activity: Yes   Other Topics Concern   • None Social History Narrative    Daily coffee consumption/6 cups a day    Daily cola consumption/1 can a day    Recreational activities hiking    Travel History/Pt denies being out of the country during 1/2014-11/7/2014     Social Determinants of Health     Financial Resource Strain: Not on file   Food Insecurity: Not on file   Transportation Needs: Not on file   Physical Activity: Not on file   Stress: Not on file   Social Connections: Not on file   Intimate Partner Violence: Not on file   Housing Stability: Not on file        Current Outpatient Medications:   •  atorvastatin (LIPITOR) 20 mg tablet, Take 1 tablet (20 mg total) by mouth daily, Disp: 90 tablet, Rfl: 1  •  Fluticasone Propionate, Inhal, (Flovent Diskus) 250 MCG/BLIST AEPB, Inhale 1 puff daily, Disp: 60 blister, Rfl: 2  •  levothyroxine 200 mcg tablet, Take 1 tablet (200 mcg total) by mouth daily, Disp: 90 tablet, Rfl: 1  •  levothyroxine 75 mcg tablet, Take 1 tablet (75 mcg total) by mouth daily, Disp: 90 tablet, Rfl: 1  •  losartan (COZAAR) 50 mg tablet, Take 1 tablet (50 mg total) by mouth daily, Disp: 90 tablet, Rfl: 1  •  metFORMIN (GLUCOPHAGE-XR) 500 mg 24 hr tablet, Take 1 tablet (500 mg total) by mouth daily with dinner, Disp: 90 tablet, Rfl: 1  •  potassium chloride (K-DUR,KLOR-CON) 20 mEq tablet, Take 1 tablet (20 mEq total) by mouth daily, Disp: 30 tablet, Rfl: 0  No current facility-administered medications for this visit  Review of Systems   Constitutional: Negative for appetite change, chills, fatigue, fever and unexpected weight change  HENT: Negative for congestion, hearing loss and postnasal drip  Respiratory: Negative for cough and shortness of breath  Cardiovascular: Positive for leg swelling (Bilateral)  Musculoskeletal: Negative for gait problem  Skin: Positive for wound (Both legs)  Negative for rash  Neurological: Negative for numbness  Hematological: Does not bruise/bleed easily     Psychiatric/Behavioral: Negative  Objective:  /70   Pulse 72   Temp 97 6 °F (36 4 °C)   Resp 22   Pain Score: 0-No pain     Physical Exam  Vitals and nursing note reviewed  Constitutional:       Appearance: Normal appearance  He is well-developed  He is morbidly obese  Comments: Right lateral calf significantly improved with a few areas remaining open  Some fibrin and slough noted  On the left lower extremity, the ulcers are now closed  Edema is improved bilaterally  HENT:      Head: Normocephalic and atraumatic  Cardiovascular:      Rate and Rhythm: Normal rate  Pulmonary:      Effort: Pulmonary effort is normal    Skin:     General: Skin is warm and dry  Findings: Wound present  Neurological:      Mental Status: He is alert and oriented to person, place, and time  Psychiatric:         Attention and Perception: Attention normal          Mood and Affect: Mood and affect normal          Behavior: Behavior is cooperative  Cognition and Memory: Cognition normal                      Wound 12/01/22 Venous Ulcer Leg Right;Lateral (Active)   Wound Image Images linked 12/08/22 1430   Wound Description Epithelialization;Granulation tissue; Yellow;Pink;Beefy red 12/08/22 1420   Sada-wound Assessment Hyperpigmented;Dry;Scaly 12/08/22 1420   Wound Length (cm) 7 cm 12/08/22 1420   Wound Width (cm) 4 cm 12/08/22 1420   Wound Depth (cm) 0 1 cm 12/08/22 1420   Wound Surface Area (cm^2) 28 cm^2 12/08/22 1420   Wound Volume (cm^3) 2 8 cm^3 12/08/22 1420   Calculated Wound Volume (cm^3) 2 8 cm^3 12/08/22 1420   Change in Wound Size % 30 12/08/22 1420   Drainage Amount Small 12/08/22 1420   Drainage Description Serosanguineous 12/08/22 1420   Non-staged Wound Description Full thickness 12/08/22 1420   Treatments Cleansed 12/08/22 1420   Dressing Status Removed 12/08/22 1420       Wound 12/01/22 Venous Ulcer Leg Left (Active)   Wound Image Images linked 12/08/22 1429   Wound Description Intact 12/08/22 1418 Sada-wound Assessment Dry;Scaly 12/08/22 1418   Wound Length (cm) 0 cm 12/08/22 1418   Wound Width (cm) 0 cm 12/08/22 1418   Wound Depth (cm) 0 cm 12/08/22 1418   Wound Surface Area (cm^2) 0 cm^2 12/08/22 1418   Wound Volume (cm^3) 0 cm^3 12/08/22 1418   Calculated Wound Volume (cm^3) 0 cm^3 12/08/22 1418   Change in Wound Size % 100 12/08/22 1418   Drainage Amount None 12/08/22 1418   Treatments Cleansed 12/08/22 1418   Dressing Status Removed 12/08/22 1418       Wound 12/01/22 Venous Ulcer Leg Left;Proximal;Lower (Active)   Wound Image Images linked 12/08/22 1429   Wound Description Intact 12/08/22 1418   Sada-wound Assessment Dry;Scaly 12/08/22 1418   Wound Length (cm) 0 cm 12/08/22 1418   Wound Width (cm) 0 cm 12/08/22 1418   Wound Depth (cm) 0 cm 12/08/22 1418   Wound Surface Area (cm^2) 0 cm^2 12/08/22 1418   Wound Volume (cm^3) 0 cm^3 12/08/22 1418   Calculated Wound Volume (cm^3) 0 cm^3 12/08/22 1418   Change in Wound Size % 100 12/08/22 1418   Drainage Amount None 12/08/22 1418   Treatments Cleansed 12/08/22 1418   Dressing Status Removed 12/08/22 1418       Debridement   Wound 12/01/22 Venous Ulcer Leg Right;Lateral    Universal Protocol:  Consent: Verbal consent obtained  Written consent obtained  Consent given by: patient  Time out: Immediately prior to procedure a "time out" was called to verify the correct patient, procedure, equipment, support staff and site/side marked as required    Patient understanding: patient states understanding of the procedure being performed  Patient identity confirmed: verbally with patient      Performed by: physician  Debridement type: selective  Pain control: lidocaine 4%  Post-debridement measurements  Length (cm): 7  Width (cm): 4  Depth (cm): 0 1  Percent debrided: 40%  Surface Area (cm^2): 28  Area debrided (cm^2): 11 2  Volume (cm^3): 2 8  Devitalized tissue debrided: fibrin and slough  Instrument(s) utilized: curette  Bleeding: small  Hemostasis obtained with: pressure  Procedural pain (0-10): 0  Post-procedural pain: 0   Response to treatment: procedure was tolerated well                 Wound Instructions:  Orders Placed This Encounter   Procedures   • Wound cleansing and dressings     Cast Application       This order was created via procedure documentation  • Wound cleansing and dressings      Wash your hands with soap and water  Remove old dressing, discard into plastic bag and place in trash  Cleanse the wound with mild soap and water prior to applying a clean dressing  Do not use tissue or cotton balls  Do not scrub the wound  Pat dry using gauze  Shower NO   Left Leg Wound:   Wound is heeled  We are stopping the unna boot to this leg  Tubi  size G applied to left leg  You may apply your compression stocking to this leg when you get home  Apply first thing in the morning and may remove at bedtime  Right Leg Wounds:  Apply skin protectant to skin surrounding wound  Apply silver alginate to the wound  Cover with ABD    Secure with kerlix and tape  Unna boot applied   Do not get dressing wet  If you notice numbness, tingling, or toes are cold to touch call the wound center right away  If it is after hours go to the nearest ER  Consume 3-4 servings of protein daily    Follow up with Wound Management in 1 week     Today's Treatment:  Cleansed with NSS and redressed as ordered above     Standing Status:   Future     Standing Expiration Date:   12/8/2023   • Wound cleansing and dressings     Standing Status:   Future     Standing Expiration Date:   12/8/2023   • Debridement     This order was created via procedure documentation               Jen Higgins MD, CHT, CWS    Portions of the record may have been created with voice recognition software  Occasional wrong word or "sound alike" substitutions may have occurred due to the inherent limitations of voice recognition software   Read the chart carefully and recognize, using context, where substitutions have occurred

## 2022-12-08 NOTE — TELEPHONE ENCOUNTER
Last note on 4/4 with Dr Ronald Gonsales states 250 levothyroxine  Just want to make sure pt is taking correctly and does not need refill of 50mcg as well    Thanks!! Additional Notes: No reason Detail Level: Detailed Quality 110: Preventive Care And Screening: Influenza Immunization: Influenza Immunization not Administered for Documented Reasons.

## 2022-12-08 NOTE — PATIENT INSTRUCTIONS
Orders Placed This Encounter   Procedures    Wound cleansing and dressings     Cast Application       This order was created via procedure documentation   Wound cleansing and dressings      Wash your hands with soap and water  Remove old dressing, discard into plastic bag and place in trash  Cleanse the wound with mild soap and water prior to applying a clean dressing  Do not use tissue or cotton balls  Do not scrub the wound  Pat dry using gauze  Shower NO   Left Leg Wound:   Wound is heeled  We are stopping the unna boot to this leg  Tubi  size G applied to left leg  You may apply your compression stocking to this leg when you get home  Apply first thing in the morning and may remove at bedtime  Right Leg Wounds:  Apply skin protectant to skin surrounding wound  Apply silver alginate to the wound  Cover with ABD    Secure with kerlix and tape  Unna boot applied   Do not get dressing wet  If you notice numbness, tingling, or toes are cold to touch call the wound center right away  If it is after hours go to the nearest ER      Consume 3-4 servings of protein daily    Follow up with Wound Management in 1 week     Today's Treatment:  Cleansed with NSS and redressed as ordered above     Standing Status:   Future     Standing Expiration Date:   12/8/2023

## 2022-12-16 ENCOUNTER — OFFICE VISIT (OUTPATIENT)
Dept: WOUND CARE | Facility: CLINIC | Age: 57
End: 2022-12-16

## 2022-12-16 VITALS
HEART RATE: 68 BPM | RESPIRATION RATE: 18 BRPM | DIASTOLIC BLOOD PRESSURE: 88 MMHG | TEMPERATURE: 97.3 F | SYSTOLIC BLOOD PRESSURE: 150 MMHG

## 2022-12-16 DIAGNOSIS — L97.212 NON-PRESSURE CHRONIC ULCER OF RIGHT CALF WITH FAT LAYER EXPOSED (HCC): ICD-10-CM

## 2022-12-16 DIAGNOSIS — L97.919 VENOUS ULCER OF RIGHT LOWER EXTREMITY WITHOUT VARICOSE VEINS (HCC): Primary | ICD-10-CM

## 2022-12-16 DIAGNOSIS — I87.2 VENOUS ULCER OF RIGHT LOWER EXTREMITY WITHOUT VARICOSE VEINS (HCC): Primary | ICD-10-CM

## 2022-12-16 RX ORDER — LIDOCAINE 40 MG/G
CREAM TOPICAL ONCE
Status: COMPLETED | OUTPATIENT
Start: 2022-12-16 | End: 2022-12-16

## 2022-12-16 RX ADMIN — LIDOCAINE: 40 CREAM TOPICAL at 15:05

## 2022-12-16 NOTE — PROGRESS NOTES
Patient ID: Sunny Lindsay is a 56 y o  male Date of Birth 1965       Chief Complaint   Patient presents with   • Follow Up Wound Care Visit     Venous ulcer of right lower extremity without varicose veins (HCC)       Allergies:  Cat hair extract    Diagnosis:      Diagnosis ICD-10-CM Associated Orders   1  Venous ulcer of right lower extremity without varicose veins (HCC)  I87 2 lidocaine (LMX) 4 % cream    L97 919 Wound cleansing and dressings     Debridement      2  Non-pressure chronic ulcer of right calf with fat layer exposed ([address] )  L97 212               Assessment & Plan:  Venous stasis ulcer over the right lateral calf  Improving  A few small areas remain  Selective debridement  Hydrofera Blue  And [employer]  Follow-up in 1 week  Subjective:   12/1/22:  1st visit for this 56-year-old male referred to the wound center because of multiple ulcerations of both lower extremities  Patient states that he has had these for 2-3 months and most of then start as blisters  Some he believe started as irritation from is toe all work boot  However, he also has ulcerations on the calfs above the work boot  He does have swelling of both lower extremities  The patient was treated for cellulitis by his primary care physician  Last time he had any ulcers on his leg was approximately one year ago  He does wear compression stockings but since his skin is broken down, he has not worn them  12/8/2022: Follow-up venous stasis ulcers of both lower extremities  Edema of both lower extremities  Unna boots have been used and he has had no problem with them  He noticed that the edema is starting to decrease and the ulcers are also improving  No pain, fever or chills  12/16/2022: Follow-up venous stasis ulcers of the right lower extremity  At last visit the left was healed and he is now wearing compression stocking  No complaints  Tolerating Unna boot        The following portions of the patient's history were reviewed and updated as appropriate:   Patient Active Problem List   Diagnosis   • Benign essential HTN   • HLD (hyperlipidemia)   • Hypothyroid   • Asthma   • Primary osteoarthritis of both knees   • Obesity, Class III, BMI 40-49 9 (morbid obesity) ([facility] [address] )   • Cellulitis of right leg   • Leg wound, right   • Prediabetes   • Venous ulcer of left lower extremity without varicose veins (HCC)     Past Medical History:   Diagnosis Date   • Asthma    • Disease of thyroid gland    • Hematoma of left axilla     Last Assessed:9/23/2015   • Hypertension    • Hyperthyroidism    • Hypothyroidism    • Toxic multinodular goiter     Last Assessed:12/26/2013     Past Surgical History:   Procedure Laterality Date   • KNEE ARTHROSCOPY Left    • THYROID SURGERY      Ablation     Family History   Problem Relation Age of Onset   • Diabetes Mother         mellitus   • Hypothyroidism Sister    • Cancer Maternal Grandmother    • Parkinsonism Father    • Heart disease Neg Hx    • Stroke Neg Hx       Social History     Socioeconomic History   • Marital status: /Civil Union     Spouse name: Not on file   • Number of children: Not on file   • Years of education: Not on file   • Highest education level: Not on file   Occupational History   • Occupation:    Tobacco Use   • Smoking status: Former     Types: Cigarettes     Quit date: 1/1/2007     Years since quitting: 15 9   • Smokeless tobacco: Never   Vaping Use   • Vaping Use: Never used   Substance and Sexual Activity   • Alcohol use: Yes     Comment: Social drinker    • Drug use: No   • Sexual activity: Yes   Other Topics Concern   • Not on file   Social History Narrative    Daily coffee consumption/6 cups a day    Daily cola consumption/1 can a day    Recreational activities hiking    Travel History/Pt denies being out of the country during 1/2014-11/7/2014     Social Determinants of Health     Financial Resource Strain: Not on file   Food Insecurity: Not on file Transportation Needs: Not on file   Physical Activity: Not on file   Stress: Not on file   Social Connections: Not on file   Intimate Partner Violence: Not on file   Housing Stability: Not on file        Current Outpatient Medications:   •  atorvastatin (LIPITOR) 20 mg tablet, Take 1 tablet (20 mg total) by mouth daily, Disp: 90 tablet, Rfl: 1  •  Fluticasone Propionate, Inhal, (Flovent Diskus) 250 MCG/BLIST AEPB, Inhale 1 puff daily, Disp: 60 blister, Rfl: 2  •  levothyroxine 200 mcg tablet, Take 1 tablet (200 mcg total) by mouth daily, Disp: 90 tablet, Rfl: 1  •  levothyroxine 75 mcg tablet, Take 1 tablet (75 mcg total) by mouth daily, Disp: 90 tablet, Rfl: 1  •  losartan (COZAAR) 50 mg tablet, Take 1 tablet (50 mg total) by mouth daily, Disp: 90 tablet, Rfl: 1  •  metFORMIN (GLUCOPHAGE-XR) 500 mg 24 hr tablet, Take 1 tablet (500 mg total) by mouth daily with dinner, Disp: 90 tablet, Rfl: 1  •  potassium chloride (K-DUR,KLOR-CON) 20 mEq tablet, Take 1 tablet (20 mEq total) by mouth daily, Disp: 30 tablet, Rfl: 0  No current facility-administered medications for this visit  Review of Systems   Constitutional: Negative for appetite change, chills, fatigue, fever and unexpected weight change  HENT: Negative for congestion, hearing loss and postnasal drip  Respiratory: Negative for cough and shortness of breath  Cardiovascular: Positive for leg swelling (Bilateral)  Musculoskeletal: Negative for gait problem  Skin: Positive for wound (Right calf)  Negative for rash  Neurological: Negative for numbness  Hematological: Does not bruise/bleed easily  Psychiatric/Behavioral: Negative  Objective:  /88   Pulse 68   Temp (!) 97 3 °F (36 3 °C)   Resp 18   Pain Score: 0-No pain     Physical Exam  Vitals and nursing note reviewed  Constitutional:       Appearance: Normal appearance  He is well-developed  He is morbidly obese            Comments: Right lateral calf significantly improved with a few areas remaining open  Some fibrin and slough noted  On the left lower extremity, the ulcers are now closed  Edema is improved bilaterally  HENT:      Head: Normocephalic and atraumatic  Cardiovascular:      Rate and Rhythm: Normal rate  Pulmonary:      Effort: Pulmonary effort is normal    Skin:     General: Skin is warm and dry  Findings: Wound present  No erythema  Comments: Few scattered ulcers  Fibrin noted  No malodor  Neurological:      Mental Status: He is alert and oriented to person, place, and time  Psychiatric:         Attention and Perception: Attention normal          Mood and Affect: Mood and affect normal          Behavior: Behavior is cooperative  Cognition and Memory: Cognition normal               Wound 12/01/22 Venous Ulcer Leg Right;Lateral (Active)   Wound Image Images linked 12/16/22 1450   Wound Description Epithelialization;Granulation tissue; Yellow;Pink;Beefy red 12/16/22 1445   Sada-wound Assessment Hyperpigmented;Dry;Scaly 12/16/22 1445   Wound Length (cm) 3 cm 12/16/22 1445   Wound Width (cm) 3 7 cm 12/16/22 1445   Wound Depth (cm) 0 1 cm 12/16/22 1445   Wound Surface Area (cm^2) 11 1 cm^2 12/16/22 1445   Wound Volume (cm^3) 1 11 cm^3 12/16/22 1445   Calculated Wound Volume (cm^3) 1 11 cm^3 12/16/22 1445   Change in Wound Size % 72 25 12/16/22 1445   Drainage Amount Small 12/16/22 1445   Drainage Description Serosanguineous 12/16/22 1445   Patient Tolerance Tolerated well 12/16/22 1445   Dressing Status Removed 12/16/22 1445       Debridement   Wound 12/01/22 Venous Ulcer Leg Right;Lateral    Universal Protocol:  Consent: Verbal consent obtained  Written consent obtained  Consent given by: patient  Time out: Immediately prior to procedure a "time out" was called to verify the correct patient, procedure, equipment, support staff and site/side marked as required    Patient understanding: patient states understanding of the procedure being performed  Patient identity confirmed: verbally with patient      Performed by: physician  Debridement type: selective  Pain control: lidocaine 4%  Post-debridement measurements  Length (cm): 3  Width (cm): 3 7  Depth (cm): 0 1  Percent debrided: 50%  Surface Area (cm^2): 11 1  Area debrided (cm^2): 5 55  Volume (cm^3): 1 11  Devitalized tissue debrided: fibrin  Instrument(s) utilized: curette  Bleeding: small  Hemostasis obtained with: pressure  Procedural pain (0-10): 0  Post-procedural pain: 0   Response to treatment: procedure was tolerated well                 Wound Instructions:  Orders Placed This Encounter   Procedures   • Wound cleansing and dressings     Wound cleansing and dressings  Wash your hands with soap and water  Remove old dressing, discard into plastic bag and place in trash  Cleanse the wound with mild soap and water prior to applying a clean dressing  Do not use tissue or cotton balls  Do not scrub the wound  Pat dry using gauze  Shower NO   Left Leg Wound:   Wound is heeled  We are stopping the unna boot to this leg  Tubi  size G applied to left leg  You may apply your compression stocking to this leg when you get home  Apply first thing in the morning and may remove at bedtime      Right Leg Wounds:  Apply skin protectant to skin surrounding wound  STOP Silver Alginate  Apply Hydrofera Blue to the wound  Secure with kerlix and tape  Unna boot applied   Do not get dressing wet  If you notice numbness, tingling, or toes are cold to touch call the wound center right away  If it is after hours go to the nearest ER      Consume 3-4 servings of protein daily     Follow up with Wound Management in 1 week     Today's Treatment:  Cleansed with NSS and redressed as ordered above     Standing Status:   Future     Standing Expiration Date:   12/16/2023   • Debridement     This order was created via procedure documentation             Katty Sethi MD, CHT, CWS    Portions of the record may have been created with voice recognition software  Occasional wrong word or "sound alike" substitutions may have occurred due to the inherent limitations of voice recognition software  Read the chart carefully and recognize, using context, where substitutions have occurred

## 2022-12-16 NOTE — PATIENT INSTRUCTIONS
Orders Placed This Encounter   Procedures    Wound cleansing and dressings     Wound cleansing and dressings  Wash your hands with soap and water  Remove old dressing, discard into plastic bag and place in trash  Cleanse the wound with mild soap and water prior to applying a clean dressing  Do not use tissue or cotton balls  Do not scrub the wound  Pat dry using gauze  Shower NO   Left Leg Wound:   Wound is heeled  We are stopping the unna boot to this leg  Tubi  size G applied to left leg  You may apply your compression stocking to this leg when you get home  Apply first thing in the morning and may remove at bedtime  Right Leg Wounds:  Apply skin protectant to skin surrounding wound  STOP Silver Alginate  Apply Hydrofera Blue to the wound  Secure with kerlix and tape  Unna boot applied   Do not get dressing wet  If you notice numbness, tingling, or toes are cold to touch call the wound center right away  If it is after hours go to the nearest ER      Consume 3-4 servings of protein daily     Follow up with Wound Management in 1 week     Today's Treatment:  Cleansed with NSS and redressed as ordered above     Standing Status:   Future     Standing Expiration Date:   12/16/2023

## 2022-12-22 ENCOUNTER — OFFICE VISIT (OUTPATIENT)
Dept: WOUND CARE | Facility: CLINIC | Age: 57
End: 2022-12-22

## 2022-12-22 VITALS
SYSTOLIC BLOOD PRESSURE: 158 MMHG | RESPIRATION RATE: 18 BRPM | WEIGHT: 308.64 LBS | DIASTOLIC BLOOD PRESSURE: 82 MMHG | BODY MASS INDEX: 43.05 KG/M2 | HEART RATE: 80 BPM | TEMPERATURE: 96.8 F

## 2022-12-22 DIAGNOSIS — I87.2 VENOUS ULCER OF RIGHT LOWER EXTREMITY WITHOUT VARICOSE VEINS (HCC): Primary | ICD-10-CM

## 2022-12-22 DIAGNOSIS — E66.01 OBESITY, CLASS III, BMI 40-49.9 (MORBID OBESITY) (HCC): ICD-10-CM

## 2022-12-22 DIAGNOSIS — L97.919 VENOUS ULCER OF RIGHT LOWER EXTREMITY WITHOUT VARICOSE VEINS (HCC): Primary | ICD-10-CM

## 2022-12-22 RX ORDER — LIDOCAINE 40 MG/G
CREAM TOPICAL ONCE
Status: COMPLETED | OUTPATIENT
Start: 2022-12-22 | End: 2022-12-22

## 2022-12-22 RX ADMIN — LIDOCAINE: 40 CREAM TOPICAL at 15:00

## 2022-12-22 NOTE — PROGRESS NOTES
Cast Application    Date/Time: 12/22/2022 3:02 PM  Performed by: Negra Woodard RN  Authorized by: Arlene Antoine MD   Universal Protocol:  Consent: Verbal consent obtained  Consent given by: patient  Patient identity confirmed: verbally with patient      Pre-procedure details:     Sensation:  Unchanged  Procedure details:     Laterality:  Right    Location:  Leg    Leg:  R lower legCast type:  Unna boot      Supplies:  2 layer wrap (unn, coban, tubifast yellow)  Post-procedure details:     Pain:  Improved    Sensation:  Unchanged    Patient tolerance of procedure: Tolerated well, no immediate complications  Comments:      UNNA BOOT wrap procedure     Before application, ARACELI and/or TBI determined to be adequate for healing and application of compression  Lower extremity washed prior to application of compression wrap  With the foot in dorsiflexed position, unna boot was applied as per physician orders without complications or complaint of pain  The procedure was tolerated well  Toes warm & pink post application  Patient provided education & reinforced to observe toes for any discoloration, swelling or tingling and instructed when to report to the [address] [facility],[address] or to remove compression  Wound care as per providers orders, patient tolerated well

## 2022-12-22 NOTE — PROGRESS NOTES
Patient ID: Bennie Villarreal is a 56 y o  male Date of Birth 1965       Chief Complaint   Patient presents with   • Follow Up Wound Care Visit     RLE wound       Allergies:  Cat hair extract    Diagnosis:      Diagnosis ICD-10-CM Associated Orders   1  Venous ulcer of right lower extremity without varicose veins ([facility])  I87 2 lidocaine (LMX) 4 % cream    L97 919 Wound cleansing and dressings     Cast Application      2  Obesity, Class III, BMI 40-49 9 (morbid obesity) ([facility])  E66 01 Wound cleansing and dressings              Assessment & Plan:  Improving venous stasis ulcer of the right lateral calf  Only pinpoint openings remain  Extensive xerosis  Moisturize with Lac-Hydrin  Unna boot  Subjective:   12/1/22:  1st visit for this 29-year-old male referred to the wound center because of multiple ulcerations of both lower extremities  Patient states that he has had these for 2-3 months and most of then start as blisters  Some he believe started as irritation from is toe all work boot  However, he also has ulcerations on the calfs above the work boot  He does have swelling of both lower extremities  The patient was treated for cellulitis by his primary care physician  Last time he had any ulcers on his leg was approximately one year ago  He does wear compression stockings but since his skin is broken down, he has not worn them  12/8/2022: Follow-up venous stasis ulcers of both lower extremities  Edema of both lower extremities  Unna boots have been used and he has had no problem with them  He noticed that the edema is starting to decrease and the ulcers are also improving  No pain, fever or chills  12/16/2022: Follow-up venous stasis ulcers of the right lower extremity  At last visit the left was healed and he is now wearing compression stocking  No complaints  Tolerating Unna boot  12/22/2022: Follow-up venous stasis ulcers of the right lateral calf  Doing well  No complaints    No pain   Tolerating Unna boot        The following portions of the patient's history were reviewed and updated as appropriate:   Patient Active Problem List   Diagnosis   • Benign essential HTN   • HLD (hyperlipidemia)   • Hypothyroid   • Asthma   • Primary osteoarthritis of both knees   • Obesity, Class III, BMI 40-49 9 (morbid obesity) ([facility] [address] )   • Cellulitis of right leg   • Leg wound, right   • Prediabetes   • Venous ulcer of left lower extremity without varicose veins (HCC)     Past Medical History:   Diagnosis Date   • Asthma    • Disease of thyroid gland    • Hematoma of left axilla     Last Assessed:9/23/2015   • Hypertension    • Hyperthyroidism    • Hypothyroidism    • Toxic multinodular goiter     Last Assessed:12/26/2013     Past Surgical History:   Procedure Laterality Date   • KNEE ARTHROSCOPY Left    • THYROID SURGERY      Ablation     Family History   Problem Relation Age of Onset   • Diabetes Mother         mellitus   • Hypothyroidism Sister    • Cancer Maternal Grandmother    • Parkinsonism Father    • Heart disease Neg Hx    • Stroke Neg Hx       Social History     Socioeconomic History   • Marital status: /Civil Union     Spouse name: None   • Number of children: None   • Years of education: None   • Highest education level: None   Occupational History   • Occupation:    Tobacco Use   • Smoking status: Former     Types: Cigarettes     Quit date: 1/1/2007     Years since quitting: 15 9   • Smokeless tobacco: Never   Vaping Use   • Vaping Use: Never used   Substance and Sexual Activity   • Alcohol use: Yes     Comment: Social drinker    • Drug use: No   • Sexual activity: Yes   Other Topics Concern   • None   Social History Narrative    Daily coffee consumption/6 cups a day    Daily cola consumption/1 can a day    Recreational activities hiking    Travel History/Pt denies being out of the country during 1/2014-11/7/2014     Social Determinants of Health     Financial Resource Strain: Not on file   Food Insecurity: Not on file   Transportation Needs: Not on file   Physical Activity: Not on file   Stress: Not on file   Social Connections: Not on file   Intimate Partner Violence: Not on file   Housing Stability: Not on file        Current Outpatient Medications:   •  atorvastatin (LIPITOR) 20 mg tablet, Take 1 tablet (20 mg total) by mouth daily, Disp: 90 tablet, Rfl: 1  •  Fluticasone Propionate, Inhal, (Flovent Diskus) 250 MCG/BLIST AEPB, Inhale 1 puff daily, Disp: 60 blister, Rfl: 2  •  levothyroxine 200 mcg tablet, Take 1 tablet (200 mcg total) by mouth daily, Disp: 90 tablet, Rfl: 1  •  levothyroxine 75 mcg tablet, Take 1 tablet (75 mcg total) by mouth daily, Disp: 90 tablet, Rfl: 1  •  losartan (COZAAR) 50 mg tablet, Take 1 tablet (50 mg total) by mouth daily, Disp: 90 tablet, Rfl: 1  •  metFORMIN (GLUCOPHAGE-XR) 500 mg 24 hr tablet, Take 1 tablet (500 mg total) by mouth daily with dinner, Disp: 90 tablet, Rfl: 1  •  potassium chloride (K-DUR,KLOR-CON) 20 mEq tablet, Take 1 tablet (20 mEq total) by mouth daily, Disp: 30 tablet, Rfl: 0  No current facility-administered medications for this visit  Review of Systems   Constitutional: Negative for chills, fatigue and fever  HENT: Negative for congestion, hearing loss and postnasal drip  Respiratory: Negative for cough and shortness of breath  Cardiovascular: Positive for leg swelling (Bilateral)  Musculoskeletal: Negative for gait problem  Skin: Positive for wound (Right calf)  Negative for rash  Neurological: Negative for numbness  Hematological: Does not bruise/bleed easily  Psychiatric/Behavioral: Negative  Objective:  /82   Pulse 80   Temp (!) 96 8 °F (36 °C)   Resp 18   Wt (!) 140 kg (308 lb 10 3 oz)   BMI 43 05 kg/m²   Pain Score: 0-No pain     Physical Exam  Vitals and nursing note reviewed  Constitutional:       Appearance: Normal appearance  He is well-developed  He is morbidly obese            Comments: Right lateral calf significantly improved with a few areas remaining open  Some fibrin and slough noted  On the left lower extremity, the ulcers are now closed  Edema is improved bilaterally  HENT:      Head: Normocephalic and atraumatic  Cardiovascular:      Rate and Rhythm: Normal rate  Pulmonary:      Effort: Pulmonary effort is normal    Skin:     General: Skin is warm and dry  Findings: Wound present  No erythema  Comments: Only 1 small pinpoint area remaining  Extensive dry skin  Neurological:      Mental Status: He is alert and oriented to person, place, and time  Psychiatric:         Attention and Perception: Attention normal          Mood and Affect: Mood and affect normal          Behavior: Behavior is cooperative  Cognition and Memory: Cognition normal              Wound 12/01/22 Venous Ulcer Leg Right;Lateral (Active)   Wound Image Images linked 12/22/22 1440   Wound Description Granulation tissue;Pink;Epithelialization 12/22/22 1437   Sada-wound Assessment Hyperpigmented;Dry;Scaly;Edema 12/22/22 1437   Wound Length (cm) 0 8 cm 12/22/22 1437   Wound Width (cm) 1 cm 12/22/22 1437   Wound Depth (cm) 0 1 cm 12/22/22 1437   Wound Surface Area (cm^2) 0 8 cm^2 12/22/22 1437   Wound Volume (cm^3) 0 08 cm^3 12/22/22 1437   Calculated Wound Volume (cm^3) 0 08 cm^3 12/22/22 1437   Change in Wound Size % 98 12/22/22 1437   Drainage Amount Small 12/22/22 1437   Drainage Description Serosanguineous 12/22/22 1437   Non-staged Wound Description Full thickness 12/22/22 1437   Treatments Cleansed 12/22/22 1437                 Wound Instructions:  Orders Placed This Encounter   Procedures   • Wound cleansing and dressings         Wound cleansing and dressings  Wash your hands with soap and water  Remove old dressing, discard into plastic bag and place in trash  Cleanse the wound with mild soap and water prior to applying a clean dressing  Do not use tissue or cotton balls   Do not scrub the wound  Pat dry using gauze  Shower NO  Do not get dressing wet from a shower or rain  Left Leg Wound:  Wound is heeled  We are stopping the unna boot to this leg  Tubi  size G applied to left leg  You may apply your compression stocking to this leg when you get home  Apply first thing in the morning and may remove at bedtime      Right Leg Wounds:  Apply lac hydrin to all dry skin on right lower leg  Apply Unna boot, and tubifast yellow  Change dressing weekly at the wound center  If you notice numbness, tingling, or toes are cold to touch call the wound center right away  If it is after hours go to the nearest ER  Consume 3-4 servings of protein daily     Follow up with Wound Management in 1 week on Friday with Dr Lali Etienne      Today's Treatment:  Cleansed with soap and water,  and redressed as ordered above  Standing Status:   Future     Standing Expiration Date:   59/39/3694   • Cast Application     This order was created via procedure documentation             Darren Duncan MD, CHT, CWS    Portions of the record may have been created with voice recognition software  Occasional wrong word or "sound alike" substitutions may have occurred due to the inherent limitations of voice recognition software  Read the chart carefully and recognize, using context, where substitutions have occurred

## 2022-12-22 NOTE — PATIENT INSTRUCTIONS
Orders Placed This Encounter   Procedures    Wound cleansing and dressings         Wound cleansing and dressings  Wash your hands with soap and water  Remove old dressing, discard into plastic bag and place in trash  Cleanse the wound with mild soap and water prior to applying a clean dressing  Do not use tissue or cotton balls  Do not scrub the wound  Pat dry using gauze  Shower NO  Do not get dressing wet from a shower or rain  Left Leg Wound:  Wound is heeled  We are stopping the unna boot to this leg  Tubi  size G applied to left leg  You may apply your compression stocking to this leg when you get home  Apply first thing in the morning and may remove at bedtime  Right Leg Wounds:  Apply lac hydrin to all dry skin on right lower leg  Apply Unna boot, and tubifast yellow  Change dressing weekly at the wound center  If you notice numbness, tingling, or toes are cold to touch call the wound center right away  If it is after hours go to the nearest ER  Consume 3-4 servings of protein daily     Follow up with Wound Management in 1 week on Friday with Dr Pablito Kang  Today's Treatment:  Cleansed with soap and water,  and redressed as ordered above       Standing Status:   Future     Standing Expiration Date:   12/22/2023

## 2022-12-29 ENCOUNTER — VBI (OUTPATIENT)
Dept: ADMINISTRATIVE | Facility: OTHER | Age: 57
End: 2022-12-29

## 2022-12-30 ENCOUNTER — OFFICE VISIT (OUTPATIENT)
Dept: WOUND CARE | Facility: CLINIC | Age: 57
End: 2022-12-30

## 2022-12-30 VITALS
RESPIRATION RATE: 18 BRPM | TEMPERATURE: 98.1 F | DIASTOLIC BLOOD PRESSURE: 88 MMHG | SYSTOLIC BLOOD PRESSURE: 150 MMHG | HEART RATE: 68 BPM

## 2022-12-30 DIAGNOSIS — L97.212 NON-PRESSURE CHRONIC ULCER OF RIGHT CALF WITH FAT LAYER EXPOSED (HCC): ICD-10-CM

## 2022-12-30 DIAGNOSIS — L97.919 VENOUS ULCER OF RIGHT LOWER EXTREMITY WITHOUT VARICOSE VEINS (HCC): Primary | ICD-10-CM

## 2022-12-30 DIAGNOSIS — I87.2 VENOUS ULCER OF RIGHT LOWER EXTREMITY WITHOUT VARICOSE VEINS (HCC): Primary | ICD-10-CM

## 2022-12-30 NOTE — PROGRESS NOTES
Patient ID: Ezra Chinchilla is a 56 y o  male Date of Birth 1965       Chief Complaint   Patient presents with   • Follow Up Wound Care Visit     Venous ulcer of right lower extremity without varicose veins (HCC)       Allergies:  Cat hair extract    Diagnosis:      Diagnosis ICD-10-CM Associated Orders   1  Venous ulcer of right lower extremity without varicose veins (HCC)  I87 2 Wound cleansing and dressings    L97 919       2  Non-pressure chronic ulcer of right calf with fat layer exposed ([address] )  L97 212               Assessment & Plan:  Venous stasis ulcer of the right lateral calf  Now closed  Moisturize  Compression stockings  Discharge from wound center  Subjective:   12/1/22:  1st visit for this 51-year-old male referred to the wound center because of multiple ulcerations of both lower extremities  Patient states that he has had these for 2-3 months and most of then start as blisters  Some he believe started as irritation from is toe all work boot  However, he also has ulcerations on the calfs above the work boot  He does have swelling of both lower extremities  The patient was treated for cellulitis by his primary care physician  Last time he had any ulcers on his leg was approximately one year ago  He does wear compression stockings but since his skin is broken down, he has not worn them  12/8/2022: Follow-up venous stasis ulcers of both lower extremities  Edema of both lower extremities  Unna boots have been used and he has had no problem with them  He noticed that the edema is starting to decrease and the ulcers are also improving  No pain, fever or chills  12/16/2022: Follow-up venous stasis ulcers of the right lower extremity  At last visit the left was healed and he is now wearing compression stocking  No complaints  Tolerating Unna boot  12/22/2022: Follow-up venous stasis ulcers of the right lateral calf  Doing well  No complaints  No pain    Fernando Fleming boot     2022: Follow-up venous stasis ulcer of the right lateral calf  No complaints        The following portions of the patient's history were reviewed and updated as appropriate:   Patient Active Problem List   Diagnosis   • Benign essential HTN   • HLD (hyperlipidemia)   • Hypothyroid   • Asthma   • Primary osteoarthritis of both knees   • Obesity, Class III, BMI 40-49 9 (morbid obesity) ([address] )   • Cellulitis of right leg   • Leg wound, right   • Prediabetes   • Venous ulcer of left lower extremity without varicose veins (HCC)     Past Medical History:   Diagnosis Date   • Asthma    • Disease of thyroid gland    • Hematoma of left axilla     Last Assessed:2015   • Hypertension    • Hyperthyroidism    • Hypothyroidism    • Toxic multinodular goiter     Last Assessed:2013     Past Surgical History:   Procedure Laterality Date   • KNEE ARTHROSCOPY Left    • THYROID SURGERY      Ablation     Family History   Problem Relation Age of Onset   • Diabetes Mother         mellitus   • Hypothyroidism Sister    • Cancer Maternal Grandmother    • Parkinsonism Father    • Heart disease Neg Hx    • Stroke Neg Hx       Social History     Socioeconomic History   • Marital status: /Civil Union     Spouse name: Not on file   • Number of children: Not on file   • Years of education: Not on file   • Highest education level: Not on file   Occupational History   • Occupation:    Tobacco Use   • Smoking status: Former     Types: Cigarettes     Quit date: 2007     Years since quittin 0   • Smokeless tobacco: Never   Vaping Use   • Vaping Use: Never used   Substance and Sexual Activity   • Alcohol use: Yes     Comment: Social drinker    • Drug use: No   • Sexual activity: Yes   Other Topics Concern   • Not on file   Social History Narrative    Daily coffee consumption/6 cups a day    Daily cola consumption/1 can a day    Recreational activities hiking    Travel History/Pt denies being out of the country during 1/2014-11/7/2014     Social Determinants of Health     Financial Resource Strain: Not on file   Food Insecurity: Not on file   Transportation Needs: Not on file   Physical Activity: Not on file   Stress: Not on file   Social Connections: Not on file   Intimate Partner Violence: Not on file   Housing Stability: Not on file        Current Outpatient Medications:   •  atorvastatin (LIPITOR) 20 mg tablet, Take 1 tablet (20 mg total) by mouth daily, Disp: 90 tablet, Rfl: 1  •  Fluticasone Propionate, Inhal, (Flovent Diskus) 250 MCG/BLIST AEPB, Inhale 1 puff daily, Disp: 60 blister, Rfl: 2  •  levothyroxine 200 mcg tablet, Take 1 tablet (200 mcg total) by mouth daily, Disp: 90 tablet, Rfl: 1  •  levothyroxine 75 mcg tablet, Take 1 tablet (75 mcg total) by mouth daily, Disp: 90 tablet, Rfl: 1  •  losartan (COZAAR) 50 mg tablet, Take 1 tablet (50 mg total) by mouth daily, Disp: 90 tablet, Rfl: 1  •  metFORMIN (GLUCOPHAGE-XR) 500 mg 24 hr tablet, Take 1 tablet (500 mg total) by mouth daily with dinner, Disp: 90 tablet, Rfl: 1  •  potassium chloride (K-DUR,KLOR-CON) 20 mEq tablet, Take 1 tablet (20 mEq total) by mouth daily, Disp: 30 tablet, Rfl: 0    Review of Systems   Constitutional: Negative for chills, fatigue and fever  HENT: Negative for congestion, hearing loss and postnasal drip  Respiratory: Negative for cough and shortness of breath  Cardiovascular: Positive for leg swelling (Bilateral)  Musculoskeletal: Negative for gait problem  Skin: Positive for wound (Right calf)  Negative for rash  Neurological: Negative for numbness  Hematological: Does not bruise/bleed easily  Psychiatric/Behavioral: Negative  Objective:  /88   Pulse 68   Temp 98 1 °F (36 7 °C)   Resp 18   Pain Score: 0-No pain     Physical Exam  Vitals and nursing note reviewed  Constitutional:       Appearance: Normal appearance  He is well-developed  He is morbidly obese     HENT:      Head: Normocephalic and atraumatic  Cardiovascular:      Rate and Rhythm: Normal rate  Pulmonary:      Effort: Pulmonary effort is normal    Skin:     General: Skin is warm and dry  Findings: No erythema or wound  Comments: VSU the right calf is now closed  Neurological:      Mental Status: He is alert and oriented to person, place, and time  Psychiatric:         Attention and Perception: Attention normal          Mood and Affect: Mood and affect normal          Behavior: Behavior is cooperative  Cognition and Memory: Cognition normal              Wound 12/01/22 Venous Ulcer Leg Right;Lateral (Active)   Wound Image Images linked 12/30/22 1531   Wound Description Intact;Dry 12/30/22 1531   Sada-wound Assessment Hyperpigmented;Dry;Scaly;Edema 12/30/22 1531   Wound Length (cm) 0 cm 12/30/22 1531   Wound Width (cm) 0 cm 12/30/22 1531   Wound Depth (cm) 0 cm 12/30/22 1531   Wound Surface Area (cm^2) 0 cm^2 12/30/22 1531   Wound Volume (cm^3) 0 cm^3 12/30/22 1531   Calculated Wound Volume (cm^3) 0 cm^3 12/30/22 1531   Change in Wound Size % 100 12/30/22 1531   Drainage Amount None 12/30/22 1531   Patient Tolerance Tolerated well 12/30/22 1531   Dressing Status Removed 12/30/22 1531                 Wound Instructions:  Orders Placed This Encounter   Procedures   • Wound cleansing and dressings     Your wound is healed today  Moisturize daily and continue to wear your compression stockings  Follow up with wound management as needed     Standing Status:   Future     Standing Expiration Date:   12/30/2023             Peola Kussmaul, MD, CHT, CWS    Portions of the record may have been created with voice recognition software  Occasional wrong word or "sound alike" substitutions may have occurred due to the inherent limitations of voice recognition software  Read the chart carefully and recognize, using context, where substitutions have occurred

## 2022-12-30 NOTE — PATIENT INSTRUCTIONS
Orders Placed This Encounter   Procedures    Wound cleansing and dressings     Your wound is healed today  Moisturize daily and continue to wear your compression stockings  Follow up with wound management as needed     Standing Status:   Future     Standing Expiration Date:   12/30/2023

## 2023-01-23 ENCOUNTER — OFFICE VISIT (OUTPATIENT)
Dept: WOUND CARE | Facility: CLINIC | Age: 58
End: 2023-01-23

## 2023-01-23 VITALS
RESPIRATION RATE: 18 BRPM | DIASTOLIC BLOOD PRESSURE: 102 MMHG | HEART RATE: 76 BPM | TEMPERATURE: 96.6 F | SYSTOLIC BLOOD PRESSURE: 162 MMHG

## 2023-01-23 DIAGNOSIS — R60.0 BILATERAL EDEMA OF LOWER EXTREMITY: ICD-10-CM

## 2023-01-23 DIAGNOSIS — I87.2 VENOUS ULCER OF RIGHT LOWER EXTREMITY WITHOUT VARICOSE VEINS (HCC): Primary | ICD-10-CM

## 2023-01-23 DIAGNOSIS — E66.01 OBESITY, CLASS III, BMI 40-49.9 (MORBID OBESITY) (HCC): ICD-10-CM

## 2023-01-23 DIAGNOSIS — L97.212 NON-PRESSURE CHRONIC ULCER OF RIGHT CALF WITH FAT LAYER EXPOSED (HCC): ICD-10-CM

## 2023-01-23 DIAGNOSIS — L97.919 VENOUS ULCER OF RIGHT LOWER EXTREMITY WITHOUT VARICOSE VEINS (HCC): Primary | ICD-10-CM

## 2023-01-23 RX ORDER — LIDOCAINE 40 MG/G
CREAM TOPICAL ONCE
Status: COMPLETED | OUTPATIENT
Start: 2023-01-23 | End: 2023-01-23

## 2023-01-23 RX ADMIN — LIDOCAINE: 40 CREAM TOPICAL at 15:34

## 2023-01-23 NOTE — PROGRESS NOTES
Cast Application    Date/Time: 1/23/2023 3:50 PM  Performed by: Yenifer Higgins RN  Authorized by: Saman Elizabeth MD   Universal Protocol:  Consent: Verbal consent obtained  Consent given by: patient  Patient identity confirmed: verbally with patient      Pre-procedure details:     Sensation:  Normal  Procedure details:     Laterality:  Right    Location:  Leg    Leg:  R lower legCast type:  Unna boot      Supplies used: silver alginate, optilock, white unna boot, coban tubifast yellow  Post-procedure details:     Pain:  Unchanged    Sensation:  Normal    Patient tolerance of procedure: Tolerated well, no immediate complications  Comments:      UNNA BOOT wrap procedure     Before application, ARACELI and/or TBI determined to be adequate for healing and application of compression  Lower extremity washed prior to application of compression wrap  With the foot in dorsiflexed position, unna boot was applied as per physician orders without complications or complaint of pain  The procedure was tolerated well  Toes warm & pink post application  Patient provided education & reinforced to observe toes for any discoloration, swelling or tingling and instructed when to report to the [address] [facility],[address] or to remove compression  Wound care as per providers orders, patient tolerated well

## 2023-01-23 NOTE — PATIENT INSTRUCTIONS
Orders Placed This Encounter   Procedures    Wound cleansing and dressings     Wound cleansing and dressings  Wash your hands with soap and water  Remove old dressing, discard into plastic bag and place in trash  Cleanse the wound with mild soap and water prior to applying a clean dressing  Do not use tissue or cotton balls  Do not scrub the wound  Pat dry using gauze  Shower yes; use cast cover over unna boot; DO NOT GET WOUND OR DRESSING WET     Right Leg Wounds:  Apply Silver Alginate to wound beds  Apply optilock over alginate   Secure with kerlix and tape  Secure with unna boot  Change dressing twice per week in wound center    If you notice numbness, tingling, or toes are cold to touch call the wound center right away  If it is after hours go to the nearest ER      Consume 3-4 servings of protein daily     Follow up with nurse visit for unna boot change on Thursday or Friday this week  Follow up Dr Shon Garcia 1 week    Please bring the strength of your compression stockings to next visit      Today's Treatment:  Cleansed with NSS and redressed as ordered above     Standing Status:   Future     Standing Expiration Date:   1/23/2024

## 2023-01-23 NOTE — PROGRESS NOTES
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 ([facility])  L97 212 lidocaine (LMX) 4 % cream     Cast Application     Wound cleansing and dressings      3  Obesity, Class III, BMI 40-49 9 (morbid obesity) ([facility])  E66 01       4  Bilateral edema of lower extremity  R60 0               Assessment & Plan:  • Patient returns with recurrent venous stasis ulcers of the right calf  He states he has been wearing his compression stockings but they may be inadequate to control his swelling  • Silver alginate, OPTi lock and Unna boot today  Nurse visit in a few days  • Patient will bring in the compression strength of his current stockings and most likely will need to increase compression  Subjective:   12/1/22:  1st visit for this 44-year-old male referred to the wound center because of multiple ulcerations of both lower extremities  Patient states that he has had these for 2-3 months and most of then start as blisters  Some he believe started as irritation from is toe all work boot  However, he also has ulcerations on the calfs above the work boot  He does have swelling of both lower extremities  The patient was treated for cellulitis by his primary care physician  Last time he had any ulcers on his leg was approximately one year ago  He does wear compression stockings but since his skin is broken down, he has not worn them  12/8/2022:  Follow-up venous stasis ulcers of both lower extremities  Edema of both lower extremities  Unna boots have been used and he has had no problem with them  He noticed that the edema is starting to decrease and the ulcers are also improving  No pain, fever or chills  12/16/2022: Follow-up venous stasis ulcers of the right lower extremity  At last visit the left was healed and he is now wearing compression stocking  No complaints  Tolerating Unna boot  12/22/2022: Follow-up venous stasis ulcers of the right lateral calf  Doing well  No complaints  No pain  Tolerating Unna boot  12/30/2022: Follow-up venous stasis ulcer of the right lateral calf  No complaints  1/23/2023: Patient was recently discharged from the wound center after closure of venous stasis ulcers of the right lateral calf  He has been using his compression stockings daily  He noticed a week ago that he had breakdown on the right calf in the same area  He has been using [employer] and dry dressing  He arrived without any stocking in place        The following portions of the patient's history were reviewed and updated as appropriate:   Patient Active Problem List   Diagnosis   • Benign essential HTN   • HLD (hyperlipidemia)   • Hypothyroid   • Asthma   • Primary osteoarthritis of both knees   • Obesity, Class III, BMI 40-49 9 (morbid obesity) ([address] )   • Cellulitis of right leg   • Leg wound, right   • Prediabetes   • Venous ulcer of left lower extremity without varicose veins (HCC)     Past Medical History:   Diagnosis Date   • Asthma    • Disease of thyroid gland    • Hematoma of left axilla     Last Assessed:9/23/2015   • Hypertension    • Hyperthyroidism    • Hypothyroidism    • Toxic multinodular goiter     Last Assessed:12/26/2013     Past Surgical History:   Procedure Laterality Date   • KNEE ARTHROSCOPY Left    • THYROID SURGERY      Ablation     Family History   Problem Relation Age of Onset   • Diabetes Mother         mellitus   • Parkinsonism Father    • Hypothyroidism Sister    • Cancer Maternal Grandmother    • Diabetes Maternal Aunt    • Diabetes Maternal Uncle    • Heart disease Neg Hx    • Stroke Neg Hx       Social History     Socioeconomic History   • Marital status: /Civil Union     Spouse name: None   • Number of children: None   • Years of education: None   • Highest education level: None   Occupational History   • Occupation:    Tobacco Use   • Smoking status: Former     Types: Cigarettes     Quit date: 2007     Years since quittin 0   • Smokeless tobacco: Never   Vaping Use   • Vaping Use: Never used   Substance and Sexual Activity   • Alcohol use:  Yes     Alcohol/week: 2 0 standard drinks     Types: 2 Cans of beer per week     Comment: Social drinker    • Drug use: No   • Sexual activity: Yes   Other Topics Concern   • None   Social History Narrative    Daily coffee consumption/6 cups a day    Daily cola consumption/1 can a day    Recreational activities hiking    Travel History/Pt denies being out of the country during 2014-2014     Social Determinants of Health     Financial Resource Strain: Not on file   Food Insecurity: Not on file   Transportation Needs: Not on file   Physical Activity: Not on file   Stress: Not on file   Social Connections: Not on file   Intimate Partner Violence: Not on file   Housing Stability: Not on file        Current Outpatient Medications:   •  atorvastatin (LIPITOR) 20 mg tablet, Take 1 tablet (20 mg total) by mouth daily, Disp: 90 tablet, Rfl: 1  •  Fluticasone Propionate, Inhal, (Flovent Diskus) 250 MCG/BLIST AEPB, Inhale 1 puff daily, Disp: 60 blister, Rfl: 2  •  levothyroxine 200 mcg tablet, Take 1 tablet (200 mcg total) by mouth daily, Disp: 90 tablet, Rfl: 1  •  levothyroxine 75 mcg tablet, Take 1 tablet (75 mcg total) by mouth daily, Disp: 90 tablet, Rfl: 1  •  losartan (COZAAR) 50 mg tablet, Take 1 tablet (50 mg total) by mouth daily, Disp: 90 tablet, Rfl: 1  •  metFORMIN (GLUCOPHAGE-XR) 500 mg 24 hr tablet, Take 1 tablet (500 mg total) by mouth daily with dinner, Disp: 90 tablet, Rfl: 1  •  potassium chloride (K-DUR,KLOR-CON) 20 mEq tablet, Take 1 tablet (20 mEq total) by mouth daily, Disp: 30 tablet, Rfl: 0  No current facility-administered medications for this visit  Review of Systems   Constitutional: Negative for chills, fatigue and fever  HENT: Negative for congestion, hearing loss and postnasal drip  Respiratory: Negative for cough and shortness of breath  Cardiovascular: Positive for leg swelling (Bilateral)  Musculoskeletal: Negative for gait problem  Skin: Positive for wound (Right calf)  Negative for rash  Neurological: Negative for numbness  Hematological: Does not bruise/bleed easily  Psychiatric/Behavioral: Negative  Objective:  BP (!) 162/102 Comment: patient did not take BP meds today  Pulse 76   Temp (!) 96 6 °F (35 9 °C)   Resp 18   Pain Score:   1     Physical Exam  Vitals and nursing note reviewed  Constitutional:       Appearance: Normal appearance  He is well-developed  He is morbidly obese  HENT:      Head: Normocephalic and atraumatic  Cardiovascular:      Rate and Rhythm: Normal rate  Pulmonary:      Effort: Pulmonary effort is normal    Skin:     General: Skin is warm and dry  Findings: No erythema or wound  Comments: VSU of the right lateral calf  Multiple  Serous drainage  Bilateral edema  No signs of infection  See full wound description  Neurological:      Mental Status: He is alert and oriented to person, place, and time  Psychiatric:         Attention and Perception: Attention normal          Mood and Affect: Mood and affect normal          Behavior: Behavior is cooperative           Cognition and Memory: Cognition normal                                 Lab Results   Component Value Date    HGBA1C 6 2 11/01/2022       Wound Instructions:  Orders Placed This Encounter   Procedures   • Cast Application     This order was created via procedure documentation   • Wound cleansing and dressings     Wound cleansing and dressings  Wash your hands with soap and water  Remove old dressing, discard into plastic bag and place in trash  Cleanse the wound with mild soap and water prior to applying a clean dressing  Do not use tissue or cotton balls  Do not scrub the wound  Pat dry using gauze  Shower yes; use cast cover over unna boot; DO NOT GET WOUND OR DRESSING WET     Right Leg Wounds:  Apply Silver Alginate to wound beds  Apply optilock over alginate   Secure with kerlix and tape  Secure with unna boot  Change dressing twice per week in wound center    If you notice numbness, tingling, or toes are cold to touch call the wound center right away  If it is after hours go to the nearest ER  Consume 3-4 servings of protein daily     Follow up with nurse visit for unna boot change on Thursday or Friday this week  Follow up Dr Jim Barriga 1 week    Please bring the strength of your compression stockings to next visit      Today's Treatment:  Cleansed with NSS and redressed as ordered above     Standing Status:   Future     Standing Expiration Date:   1/23/2024             Kimberly Elam MD, CHT, CWS    Portions of the record may have been created with voice recognition software  Occasional wrong word or "sound alike" substitutions may have occurred due to the inherent limitations of voice recognition software  Read the chart carefully and recognize, using context, where substitutions have occurred

## 2023-01-26 ENCOUNTER — CLINICAL SUPPORT (OUTPATIENT)
Dept: WOUND CARE | Facility: CLINIC | Age: 58
End: 2023-01-26

## 2023-01-26 VITALS
RESPIRATION RATE: 18 BRPM | TEMPERATURE: 96.7 F | HEART RATE: 66 BPM | DIASTOLIC BLOOD PRESSURE: 88 MMHG | SYSTOLIC BLOOD PRESSURE: 160 MMHG

## 2023-01-26 DIAGNOSIS — L97.919 VENOUS ULCER OF RIGHT LOWER EXTREMITY WITHOUT VARICOSE VEINS (HCC): Primary | ICD-10-CM

## 2023-01-26 DIAGNOSIS — I87.2 VENOUS ULCER OF RIGHT LOWER EXTREMITY WITHOUT VARICOSE VEINS (HCC): Primary | ICD-10-CM

## 2023-01-26 NOTE — PROGRESS NOTES
Cast Application    Date/Time: 1/26/2023 3:50 PM  Performed by: Jada Niño RN  Authorized by: Saman Elizabeth MD   Universal Protocol:  Consent: Verbal consent obtained  Consent given by: patient  Patient identity confirmed: verbally with patient      Pre-procedure details:     Sensation:  Unchanged  Procedure details:     Laterality:  Right    Location:  Leg    Leg:  R lower legCast type:  Unna boot      Supplies:  2 layer wrap (alginate AG, optilock, kerlix, unna, coban, tubifast yellow )  Post-procedure details:     Pain:  Improved    Sensation:  Unchanged    Patient tolerance of procedure: Tolerated well, no immediate complications  Comments:      UNNA BOOT wrap procedure     Before application, ARACELI and/or TBI determined to be adequate for healing and application of compression  Lower extremity washed prior to application of compression wrap  With the foot in dorsiflexed position, unna boot was applied as per physician orders without complications or complaint of pain  The procedure was tolerated well  Toes warm & pink post application  Patient provided education & reinforced to observe toes for any discoloration, swelling or tingling and instructed when to report to the [address] [facility] or to remove compression  Wound care as per providers orders, patient tolerated well

## 2023-01-30 ENCOUNTER — OFFICE VISIT (OUTPATIENT)
Dept: WOUND CARE | Facility: CLINIC | Age: 58
End: 2023-01-30

## 2023-01-30 VITALS
RESPIRATION RATE: 18 BRPM | SYSTOLIC BLOOD PRESSURE: 182 MMHG | TEMPERATURE: 96.9 F | DIASTOLIC BLOOD PRESSURE: 94 MMHG | HEART RATE: 68 BPM

## 2023-01-30 DIAGNOSIS — L97.919 VENOUS ULCER OF RIGHT LOWER EXTREMITY WITHOUT VARICOSE VEINS (HCC): Primary | ICD-10-CM

## 2023-01-30 DIAGNOSIS — L97.212 NON-PRESSURE CHRONIC ULCER OF RIGHT CALF WITH FAT LAYER EXPOSED (HCC): ICD-10-CM

## 2023-01-30 DIAGNOSIS — I87.2 VENOUS ULCER OF RIGHT LOWER EXTREMITY WITHOUT VARICOSE VEINS (HCC): Primary | ICD-10-CM

## 2023-01-30 RX ORDER — LIDOCAINE 40 MG/G
CREAM TOPICAL ONCE
Status: COMPLETED | OUTPATIENT
Start: 2023-01-30 | End: 2023-01-30

## 2023-01-30 RX ADMIN — LIDOCAINE: 40 CREAM TOPICAL at 15:19

## 2023-01-30 NOTE — PROGRESS NOTES
Patient ID: Olivia Bourgeois is a 56 y o  male Date of Birth 1965       Chief Complaint   Patient presents with   • Follow Up Wound Care Visit     Venous ulcer of right lower extremity without varicose veins (HCC)       Allergies:  Cat hair extract    Diagnosis:      Diagnosis ICD-10-CM Associated Orders   1  Venous ulcer of right lower extremity without varicose veins (HCC)  I87 2 lidocaine (LMX) 4 % cream    L97 917 Wound cleansing and dressings     Debridement      2  Non-pressure chronic ulcer of right calf with fat layer exposed ([address] )  L97 212               Assessment & Plan:  Slightly improving stasis ulcers of the right lateral calf  Selective debridement in 1 area  Silver alginate and Unna boot  Patient does have 15 to 20 mmHg compression stockings which we will transition him to when possible  Subjective:   12/1/22:  1st visit for this 53-year-old male referred to the wound center because of multiple ulcerations of both lower extremities  Patient states that he has had these for 2-3 months and most of then start as blisters  Some he believe started as irritation from is toe all work boot  However, he also has ulcerations on the calfs above the work boot  He does have swelling of both lower extremities  The patient was treated for cellulitis by his primary care physician  Last time he had any ulcers on his leg was approximately one year ago  He does wear compression stockings but since his skin is broken down, he has not worn them  12/8/2022: Follow-up venous stasis ulcers of both lower extremities  Edema of both lower extremities  Unna boots have been used and he has had no problem with them  He noticed that the edema is starting to decrease and the ulcers are also improving  No pain, fever or chills  12/16/2022: Follow-up venous stasis ulcers of the right lower extremity  At last visit the left was healed and he is now wearing compression stocking  No complaints    Tolerating Unna boot  12/22/2022: Follow-up venous stasis ulcers of the right lateral calf  Doing well  No complaints  No pain  Tolerating Unna boot  12/30/2022: Follow-up venous stasis ulcer of the right lateral calf  No complaints  1/23/2023: Patient was recently discharged from the wound center after closure of venous stasis ulcers of the right lateral calf  He has been using his compression stockings daily  He noticed a week ago that he had breakdown on the right calf in the same area  He has been using [employer] and dry dressing  He arrived without any stocking in place  1/30/2023: Follow-up venous stasis ulcers of the right lateral calf  Doing well with [employer]  He has compression stocking 15 to 20 mmHg  Not wearing though because of Unna boot  No new complaints        The following portions of the patient's history were reviewed and updated as appropriate:   Patient Active Problem List   Diagnosis   • Benign essential HTN   • HLD (hyperlipidemia)   • Hypothyroid   • Asthma   • Primary osteoarthritis of both knees   • Obesity, Class III, BMI 40-49 9 (morbid obesity) ([address] )   • Cellulitis of right leg   • Leg wound, right   • Prediabetes   • Venous ulcer of left lower extremity without varicose veins (HCC)     Past Medical History:   Diagnosis Date   • Asthma    • Disease of thyroid gland    • Hematoma of left axilla     Last Assessed:9/23/2015   • Hypertension    • Hyperthyroidism    • Hypothyroidism    • Toxic multinodular goiter     Last Assessed:12/26/2013     Past Surgical History:   Procedure Laterality Date   • KNEE ARTHROSCOPY Left    • THYROID SURGERY      Ablation     Family History   Problem Relation Age of Onset   • Diabetes Mother         mellitus   • Parkinsonism Father    • Hypothyroidism Sister    • Cancer Maternal Grandmother    • Diabetes Maternal Aunt    • Diabetes Maternal Uncle    • Heart disease Neg Hx    • Stroke Neg Hx       Social History     Socioeconomic History   • Marital status: /Civil Union     Spouse name: Not on file   • Number of children: Not on file   • Years of education: Not on file   • Highest education level: Not on file   Occupational History   • Occupation:    Tobacco Use   • Smoking status: Former     Packs/day: 0 75     Years: 15 00     Pack years: 11 25     Types: Cigarettes     Quit date: 2007     Years since quittin 0   • Smokeless tobacco: Never   Vaping Use   • Vaping Use: Never used   Substance and Sexual Activity   • Alcohol use:  Yes     Alcohol/week: 2 0 standard drinks     Types: 2 Cans of beer per week     Comment: Social drinker    • Drug use: No   • Sexual activity: Yes   Other Topics Concern   • Not on file   Social History Narrative    Daily coffee consumption/6 cups a day    Daily cola consumption/1 can a day    Recreational activities hiking    Travel History/Pt denies being out of the country during 2014-2014     Social Determinants of Health     Financial Resource Strain: Not on file   Food Insecurity: Not on file   Transportation Needs: Not on file   Physical Activity: Not on file   Stress: Not on file   Social Connections: Not on file   Intimate Partner Violence: Not on file   Housing Stability: Not on file        Current Outpatient Medications:   •  atorvastatin (LIPITOR) 20 mg tablet, Take 1 tablet (20 mg total) by mouth daily, Disp: 90 tablet, Rfl: 1  •  Fluticasone Propionate, Inhal, (Flovent Diskus) 250 MCG/BLIST AEPB, Inhale 1 puff daily, Disp: 60 blister, Rfl: 2  •  levothyroxine 200 mcg tablet, Take 1 tablet (200 mcg total) by mouth daily, Disp: 90 tablet, Rfl: 1  •  levothyroxine 75 mcg tablet, Take 1 tablet (75 mcg total) by mouth daily, Disp: 90 tablet, Rfl: 1  •  losartan (COZAAR) 50 mg tablet, Take 1 tablet (50 mg total) by mouth daily, Disp: 90 tablet, Rfl: 1  •  metFORMIN (GLUCOPHAGE-XR) 500 mg 24 hr tablet, Take 1 tablet (500 mg total) by mouth daily with dinner, Disp: 90 tablet, Rfl: 1  •  potassium chloride (K-DUR,KLOR-CON) 20 mEq tablet, Take 1 tablet (20 mEq total) by mouth daily, Disp: 30 tablet, Rfl: 0  No current facility-administered medications for this visit  Review of Systems   Constitutional: Negative for chills, fatigue and fever  HENT: Negative for congestion, hearing loss and postnasal drip  Respiratory: Negative for cough and shortness of breath  Cardiovascular: Positive for leg swelling (Bilateral)  Musculoskeletal: Negative for gait problem  Skin: Positive for wound (Right calf)  Negative for rash  Neurological: Negative for numbness  Hematological: Does not bruise/bleed easily  Psychiatric/Behavioral: Negative  Objective:  BP (!) 182/94   Pulse 68   Temp (!) 96 9 °F (36 1 °C)   Resp 18   Pain Score: 0-No pain     Physical Exam  Vitals and nursing note reviewed  Constitutional:       Appearance: Normal appearance  He is well-developed  He is morbidly obese  HENT:      Head: Normocephalic and atraumatic  Cardiovascular:      Rate and Rhythm: Normal rate  Pulmonary:      Effort: Pulmonary effort is normal    Skin:     General: Skin is warm and dry  Findings: Wound present  No erythema  Comments: VSU of the right lateral calf  Multiple  Less serous drainage  Bilateral edema  No signs of infection  See full wound description  Neurological:      Mental Status: He is alert and oriented to person, place, and time  Psychiatric:         Attention and Perception: Attention normal          Mood and Affect: Mood and affect normal          Behavior: Behavior is cooperative           Cognition and Memory: Cognition normal                   Wound 12/01/22 Venous Ulcer Leg Right;Lateral (Active)   Wound Image Images linked 01/30/23 0494   Wound Description Epithelialization;Pink;Slough 01/30/23 1501   Sada-wound Assessment Edema;Scaly;Dry 01/30/23 1501   Wound Length (cm) 6 cm 01/30/23 1501   Wound Width (cm) 1 2 cm 01/30/23 1501   Wound Depth (cm) 0 1 cm 01/30/23 1501   Wound Surface Area (cm^2) 7 2 cm^2 01/30/23 1501   Wound Volume (cm^3) 0 72 cm^3 01/30/23 1501   Calculated Wound Volume (cm^3) 0 72 cm^3 01/30/23 1501   Change in Wound Size % 82 01/30/23 1501   Drainage Amount Small 01/30/23 1501   Drainage Description Serosanguineous 01/30/23 1501   Patient Tolerance Tolerated well 01/30/23 1501   Dressing Status Removed 01/30/23 1501       Wound 01/23/23 Venous Ulcer Leg Right; Anterior (Active)   Wound Image Images linked 01/30/23 1458   Wound Description Granulation tissue;Pink;Epithelialization 01/30/23 1500   Sada-wound Assessment Dry;Scaly 01/30/23 1500   Wound Length (cm) 0 6 cm 01/30/23 1500   Wound Width (cm) 1 cm 01/30/23 1500   Wound Depth (cm) 0 2 cm 01/30/23 1500   Wound Surface Area (cm^2) 0 6 cm^2 01/30/23 1500   Wound Volume (cm^3) 0 12 cm^3 01/30/23 1500   Calculated Wound Volume (cm^3) 0 12 cm^3 01/30/23 1500   Change in Wound Size % -20 01/30/23 1500   Drainage Amount Small 01/30/23 1500   Drainage Description Serosanguineous 01/30/23 1500   Patient Tolerance Tolerated well 01/30/23 1500   Dressing Status Removed 01/30/23 1500       Debridement   Universal Protocol:  Consent: Verbal consent obtained  Written consent obtained  Consent given by: patient  Time out: Immediately prior to procedure a "time out" was called to verify the correct patient, procedure, equipment, support staff and site/side marked as required    Patient understanding: patient states understanding of the procedure being performed  Patient identity confirmed: verbally with patient      Performed by: physician  Debridement type: selective  Pain control: lidocaine 4%  Post-debridement measurements  Length (cm): 6  Width (cm): 1 2  Depth (cm): 0 1  Percent debrided: 25%  Surface Area (cm^2): 7 2  Area debrided (cm^2): 1 8  Volume (cm^3): 0 72  Devitalized tissue debrided: fibrin and slough  Instrument(s) utilized: curette  Bleeding: small  Hemostasis obtained with: pressure  Procedural pain (0-10): 0  Post-procedural pain: 0   Response to treatment: procedure was tolerated well                 Lab Results   Component Value Date    HGBA1C 6 2 11/01/2022       Wound Instructions:  Orders Placed This Encounter   Procedures   • Wound cleansing and dressings     Wound cleansing and dressings  Wash your hands with soap and water  Remove old dressing, discard into plastic bag and place in trash  Cleanse the wound with mild soap and water prior to applying a clean dressing  Do not use tissue or cotton balls  Do not scrub the wound  Pat dry using gauze  Shower yes; use cast cover over unna boot; DO NOT GET WOUND OR DRESSING WET     Right Leg Wounds:  Apply Silver Alginate to wound beds  Cover with gauze  Secure with kerlix and tape  Secure with unna boot  Change dressing once a week in wound center     If you notice numbness, tingling, or toes are cold to touch call the wound center right away  If it is after hours go to the nearest ER  Consume 3-4 servings of protein daily     Follow up Dr Shon Garcia 1 week     Please bring the strength of your compression stockings to next visit      Today's Treatment:  Cleansed with NSS and redressed as ordered above     Standing Status:   Future     Standing Expiration Date:   1/30/2024   • Debridement     This order was created via procedure documentation             Thaddeus Walsh MD, CHT, CWS    Portions of the record may have been created with voice recognition software  Occasional wrong word or "sound alike" substitutions may have occurred due to the inherent limitations of voice recognition software  Read the chart carefully and recognize, using context, where substitutions have occurred

## 2023-01-30 NOTE — PATIENT INSTRUCTIONS
Orders Placed This Encounter   Procedures    Wound cleansing and dressings     Wound cleansing and dressings  Wash your hands with soap and water  Remove old dressing, discard into plastic bag and place in trash  Cleanse the wound with mild soap and water prior to applying a clean dressing  Do not use tissue or cotton balls  Do not scrub the wound  Pat dry using gauze  Shower yes; use cast cover over unna boot; DO NOT GET WOUND OR DRESSING WET     Right Leg Wounds:  Apply Silver Alginate to wound beds  Cover with gauze  Secure with kerlix and tape  Secure with unna boot  Change dressing once a week in wound center     If you notice numbness, tingling, or toes are cold to touch call the wound center right away  If it is after hours go to the nearest ER      Consume 3-4 servings of protein daily     Follow up Dr Nix Ainsley 1 week     Please bring the strength of your compression stockings to next visit      Today's Treatment:  Cleansed with NSS and redressed as ordered above     Standing Status:   Future     Standing Expiration Date:   1/30/2024

## 2023-02-06 ENCOUNTER — OFFICE VISIT (OUTPATIENT)
Dept: WOUND CARE | Facility: CLINIC | Age: 58
End: 2023-02-06

## 2023-02-06 VITALS
RESPIRATION RATE: 18 BRPM | HEART RATE: 64 BPM | TEMPERATURE: 96.9 F | SYSTOLIC BLOOD PRESSURE: 150 MMHG | DIASTOLIC BLOOD PRESSURE: 84 MMHG

## 2023-02-06 DIAGNOSIS — L97.212 NON-PRESSURE CHRONIC ULCER OF RIGHT CALF WITH FAT LAYER EXPOSED (HCC): ICD-10-CM

## 2023-02-06 DIAGNOSIS — I87.2 VENOUS ULCER OF RIGHT LOWER EXTREMITY WITHOUT VARICOSE VEINS (HCC): Primary | ICD-10-CM

## 2023-02-06 DIAGNOSIS — R60.0 BILATERAL EDEMA OF LOWER EXTREMITY: ICD-10-CM

## 2023-02-06 DIAGNOSIS — L97.919 VENOUS ULCER OF RIGHT LOWER EXTREMITY WITHOUT VARICOSE VEINS (HCC): Primary | ICD-10-CM

## 2023-02-06 RX ORDER — LIDOCAINE 40 MG/G
CREAM TOPICAL ONCE
Status: COMPLETED | OUTPATIENT
Start: 2023-02-06 | End: 2023-02-06

## 2023-02-06 RX ADMIN — LIDOCAINE: 40 CREAM TOPICAL at 16:24

## 2023-02-06 NOTE — PATIENT INSTRUCTIONS
Orders Placed This Encounter   Procedures    Wound cleansing and dressings     Wound cleansing and dressings  Wash your hands with soap and water  Remove old dressing, discard into plastic bag and place in trash  Cleanse the wound with mild soap and water prior to applying a clean dressing  Do not use tissue or cotton balls  Do not scrub the wound  Pat dry using gauze  Shower yes; use cast cover over unna boot; DO NOT GET WOUND OR DRESSING WET     Right Leg Wounds:  Cover all wounds with large optilock  Secure with kerlix and tape  Secure with coflex  Change dressing once a week in wound center     If you notice numbness, tingling, or toes are cold to touch call the wound center right away  If it is after hours go to the nearest ER  Consume 3-4 servings of protein daily  Follow up at the wound center in 1 week          Today's Treatment:  Cleansed with soap and water, and redressed as ordered above     Standing Status:   Future     Standing Expiration Date:   2/6/2024

## 2023-02-06 NOTE — PROGRESS NOTES
Patient ID: Kimberlyn Hadley is a 56 y o  male Date of Birth 1965       Chief Complaint   Patient presents with   • Follow Up Wound Care Visit     RLE wound       Allergies:  Cat hair extract    Diagnosis:      Diagnosis ICD-10-CM Associated Orders   1  Venous ulcer of right lower extremity without varicose veins (HCC)  I87 2 lidocaine (LMX) 4 % cream    L97 919 Wound cleansing and dressings     Debridement     Debridement      2  Bilateral edema of lower extremity  R60 0 Wound cleansing and dressings      3  Non-pressure chronic ulcer of right calf with fat layer exposed ([address] )  L97 212               Assessment & Plan:  Deterioration of venous stasis ulcer of the right lateral calf  Most likely this is due to drainage  Optilock and Coflex  Follow-up in 1 week  Subjective:   12/1/22:  1st visit for this 73-year-old male referred to the wound center because of multiple ulcerations of both lower extremities  Patient states that he has had these for 2-3 months and most of then start as blisters  Some he believe started as irritation from is toe all work boot  However, he also has ulcerations on the calfs above the work boot  He does have swelling of both lower extremities  The patient was treated for cellulitis by his primary care physician  Last time he had any ulcers on his leg was approximately one year ago  He does wear compression stockings but since his skin is broken down, he has not worn them  12/8/2022: Follow-up venous stasis ulcers of both lower extremities  Edema of both lower extremities  Unna boots have been used and he has had no problem with them  He noticed that the edema is starting to decrease and the ulcers are also improving  No pain, fever or chills  12/16/2022: Follow-up venous stasis ulcers of the right lower extremity  At last visit the left was healed and he is now wearing compression stocking  No complaints  Tolerating Unna boot  12/22/2022:  Follow-up venous stasis ulcers of the right lateral calf  Doing well  No complaints  No pain  Tolerating Unna boot  12/30/2022: Follow-up venous stasis ulcer of the right lateral calf  No complaints  1/23/2023: Patient was recently discharged from the wound center after closure of venous stasis ulcers of the right lateral calf  He has been using his compression stockings daily  He noticed a week ago that he had breakdown on the right calf in the same area  He has been using [employer] and dry dressing  He arrived without any stocking in place  1/30/2023: Follow-up venous stasis ulcers of the right lateral calf  Doing well with [employer]  He has compression stocking 15 to 20 mmHg  Not wearing though because of Unna boot  No new complaints  2/6/2023: Follow-up venous stasis ulcer of the right lateral calf  No complaints  Doing well  No fever or chills        The following portions of the patient's history were reviewed and updated as appropriate:   Patient Active Problem List   Diagnosis   • Benign essential HTN   • HLD (hyperlipidemia)   • Hypothyroid   • Asthma   • Primary osteoarthritis of both knees   • Obesity, Class III, BMI 40-49 9 (morbid obesity) ([facility] [address] )   • Cellulitis of right leg   • Leg wound, right   • Prediabetes   • Venous ulcer of left lower extremity without varicose veins (HCC)     Past Medical History:   Diagnosis Date   • Asthma    • Disease of thyroid gland    • Hematoma of left axilla     Last Assessed:9/23/2015   • Hypertension    • Hyperthyroidism    • Hypothyroidism    • Toxic multinodular goiter     Last Assessed:12/26/2013     Past Surgical History:   Procedure Laterality Date   • KNEE ARTHROSCOPY Left    • THYROID SURGERY      Ablation     Family History   Problem Relation Age of Onset   • Diabetes Mother         mellitus   • Parkinsonism Father    • Hypothyroidism Sister    • Cancer Maternal Grandmother    • Diabetes Maternal Aunt    • Diabetes Maternal Uncle    • Heart disease Neg Hx • Stroke Neg Hx       Social History     Socioeconomic History   • Marital status: /Civil Union     Spouse name: None   • Number of children: None   • Years of education: None   • Highest education level: None   Occupational History   • Occupation:    Tobacco Use   • Smoking status: Former     Packs/day: 0 75     Years: 15 00     Pack years: 11 25     Types: Cigarettes     Quit date: 2007     Years since quittin 1   • Smokeless tobacco: Never   Vaping Use   • Vaping Use: Never used   Substance and Sexual Activity   • Alcohol use:  Yes     Alcohol/week: 2 0 standard drinks     Types: 2 Cans of beer per week     Comment: Social drinker    • Drug use: No   • Sexual activity: Yes   Other Topics Concern   • None   Social History Narrative    Daily coffee consumption/6 cups a day    Daily cola consumption/1 can a day    Recreational activities hiking    Travel History/Pt denies being out of the country during 2014-2014     Social Determinants of Health     Financial Resource Strain: Not on file   Food Insecurity: Not on file   Transportation Needs: Not on file   Physical Activity: Not on file   Stress: Not on file   Social Connections: Not on file   Intimate Partner Violence: Not on file   Housing Stability: Not on file        Current Outpatient Medications:   •  atorvastatin (LIPITOR) 20 mg tablet, Take 1 tablet (20 mg total) by mouth daily, Disp: 90 tablet, Rfl: 1  •  Fluticasone Propionate, Inhal, (Flovent Diskus) 250 MCG/BLIST AEPB, Inhale 1 puff daily, Disp: 60 blister, Rfl: 2  •  levothyroxine 200 mcg tablet, Take 1 tablet (200 mcg total) by mouth daily, Disp: 90 tablet, Rfl: 1  •  levothyroxine 75 mcg tablet, Take 1 tablet (75 mcg total) by mouth daily, Disp: 90 tablet, Rfl: 1  •  losartan (COZAAR) 50 mg tablet, Take 1 tablet (50 mg total) by mouth daily, Disp: 90 tablet, Rfl: 1  •  metFORMIN (GLUCOPHAGE-XR) 500 mg 24 hr tablet, Take 1 tablet (500 mg total) by mouth daily with dinner, Disp: 90 tablet, Rfl: 1  •  potassium chloride (K-DUR,KLOR-CON) 20 mEq tablet, Take 1 tablet (20 mEq total) by mouth daily, Disp: 30 tablet, Rfl: 0  No current facility-administered medications for this visit  Review of Systems   Constitutional: Negative for chills, fatigue and fever  HENT: Negative for congestion, hearing loss and postnasal drip  Respiratory: Negative for cough and shortness of breath  Cardiovascular: Positive for leg swelling (Bilateral)  Musculoskeletal: Negative for gait problem  Skin: Positive for wound (Right calf)  Negative for rash  Neurological: Negative for numbness  Hematological: Does not bruise/bleed easily  Psychiatric/Behavioral: Negative  Objective:  /84   Pulse 64   Temp (!) 96 9 °F (36 1 °C)   Resp 18   Pain Score: 0-No pain     Physical Exam  Vitals and nursing note reviewed  Constitutional:       Appearance: Normal appearance  He is well-developed  He is morbidly obese  HENT:      Head: Normocephalic and atraumatic  Cardiovascular:      Rate and Rhythm: Normal rate  Pulmonary:      Effort: Pulmonary effort is normal    Skin:     General: Skin is warm and dry  Findings: Wound present  No erythema  Comments: VSU of the right lateral calf  Multiple  Increased serous drainage  Bilateral edema  No signs of infection  See full wound description  Seems to be skin breakdown due to increased drainage with alginate  Neurological:      Mental Status: He is alert and oriented to person, place, and time  Psychiatric:         Attention and Perception: Attention normal          Mood and Affect: Mood and affect normal          Behavior: Behavior is cooperative           Cognition and Memory: Cognition normal          Wound 12/01/22 Venous Ulcer Leg Right;Lateral (Active)   Wound Image   02/06/23 1526   Wound Description Beefy red;Pink;Hypergranulation 02/06/23 1528   Sada-wound Assessment Edema;Scaly;Dry 02/06/23 1528   Wound Length (cm) 7 2 cm 02/06/23 1528   Wound Width (cm) 2 5 cm 02/06/23 1528   Wound Depth (cm) 0 2 cm 02/06/23 1528   Wound Surface Area (cm^2) 18 cm^2 02/06/23 1528   Wound Volume (cm^3) 3 6 cm^3 02/06/23 1528   Calculated Wound Volume (cm^3) 3 6 cm^3 02/06/23 1528   Change in Wound Size % 10 02/06/23 1528   Drainage Amount Moderate 02/06/23 1528   Drainage Description Serosanguineous 02/06/23 1528   Non-staged Wound Description Full thickness 02/06/23 1528   Treatments Cleansed 02/06/23 1528   Wound packed? No 02/06/23 1528   Dressing Changed Changed 12/05/22 1515   Patient Tolerance Tolerated well 01/30/23 1501   Dressing Status Removed 01/30/23 1501       Wound 01/23/23 Venous Ulcer Leg Right; Anterior (Active)   Wound Image   02/06/23 1525   Wound Description Beefy red;Granulation tissue; Epithelialization 02/06/23 1526   Sada-wound Assessment Dry;Scaly;Edema 02/06/23 1526   Wound Length (cm) 1 2 cm 02/06/23 1526   Wound Width (cm) 1 3 cm 02/06/23 1526   Wound Depth (cm) 0 1 cm 02/06/23 1526   Wound Surface Area (cm^2) 1 56 cm^2 02/06/23 1526   Wound Volume (cm^3) 0 156 cm^3 02/06/23 1526   Calculated Wound Volume (cm^3) 0 16 cm^3 02/06/23 1526   Change in Wound Size % -60 02/06/23 1526   Drainage Amount Small 02/06/23 1526   Drainage Description Serosanguineous 02/06/23 1526   Non-staged Wound Description Full thickness 02/06/23 1526   Treatments Cleansed 02/06/23 1526   Wound packed? No 02/06/23 1526   Patient Tolerance Tolerated well 01/30/23 1500   Dressing Status Removed 01/30/23 1500                            Debridement   Wound 12/01/22 Venous Ulcer Leg Right;Lateral    Universal Protocol:  Consent: Verbal consent obtained  Written consent obtained  Consent given by: patient  Time out: Immediately prior to procedure a "time out" was called to verify the correct patient, procedure, equipment, support staff and site/side marked as required    Patient understanding: patient states understanding of the procedure being performed  Patient identity confirmed: verbally with patient      Performed by: physician  Debridement type: selective  Pain control: lidocaine 4%  Post-debridement measurements  Length (cm): 7 2  Width (cm): 2 5  Depth (cm): 0 1  Percent debrided: 40%  Surface Area (cm^2): 18  Area debrided (cm^2): 7 2  Volume (cm^3): 1 8  Devitalized tissue debrided: exudate and fibrin  Instrument(s) utilized: curette  Bleeding: small  Hemostasis obtained with: pressure  Procedural pain (0-10): 0  Post-procedural pain: 0   Response to treatment: procedure was tolerated well    Debridement   Wound 01/23/23 Venous Ulcer Leg Right; Anterior    Universal Protocol:  Consent: Verbal consent obtained  Written consent obtained  Consent given by: patient  Time out: Immediately prior to procedure a "time out" was called to verify the correct patient, procedure, equipment, support staff and site/side marked as required  Patient understanding: patient states understanding of the procedure being performed  Patient identity confirmed: verbally with patient      Performed by: physician  Debridement type: selective  Pain control: lidocaine 4%  Post-debridement measurements  Length (cm): 1 2  Width (cm): 1 3  Depth (cm): 0 1  Percent debrided: 100%  Surface Area (cm^2): 1 56  Area debrided (cm^2): 1 56  Volume (cm^3): 0 16  Devitalized tissue debrided: exudate and fibrin  Instrument(s) utilized: curette  Bleeding: small  Hemostasis obtained with: pressure  Procedural pain (0-10): 0  Post-procedural pain: 0   Response to treatment: procedure was tolerated well                 Lab Results   Component Value Date    HGBA1C 6 2 11/01/2022       Wound Instructions:  Orders Placed This Encounter   Procedures   • Wound cleansing and dressings     Wound cleansing and dressings  Wash your hands with soap and water  Remove old dressing, discard into plastic bag and place in trash      Cleanse the wound with mild soap and water prior to applying a clean dressing  Do not use tissue or cotton balls  Do not scrub the wound  Pat dry using gauze  Shower yes; use cast cover over unna boot; DO NOT GET WOUND OR DRESSING WET     Right Leg Wounds:  Cover all wounds with large optilock  Secure with kerlix and tape  Secure with coflex  Change dressing once a week in wound center     If you notice numbness, tingling, or toes are cold to touch call the wound center right away  If it is after hours go to the nearest ER  Consume 3-4 servings of protein daily      Follow up at the wound center in 1 week         Today's Treatment:  Cleansed with soap and water, and redressed as ordered above     Standing Status:   Future     Standing Expiration Date:   2/6/2024   • Debridement     This order was created via procedure documentation   • Debridement     This order was created via procedure documentation             Yara Regalado MD, CHT, CWS    Portions of the record may have been created with voice recognition software  Occasional wrong word or "sound alike" substitutions may have occurred due to the inherent limitations of voice recognition software  Read the chart carefully and recognize, using context, where substitutions have occurred

## 2023-02-13 ENCOUNTER — OFFICE VISIT (OUTPATIENT)
Dept: WOUND CARE | Facility: CLINIC | Age: 58
End: 2023-02-13

## 2023-02-13 VITALS
SYSTOLIC BLOOD PRESSURE: 154 MMHG | TEMPERATURE: 98.3 F | HEART RATE: 64 BPM | DIASTOLIC BLOOD PRESSURE: 86 MMHG | RESPIRATION RATE: 18 BRPM

## 2023-02-13 DIAGNOSIS — R60.0 BILATERAL EDEMA OF LOWER EXTREMITY: ICD-10-CM

## 2023-02-13 DIAGNOSIS — I87.2 VENOUS ULCER OF RIGHT LOWER EXTREMITY WITHOUT VARICOSE VEINS (HCC): Primary | ICD-10-CM

## 2023-02-13 DIAGNOSIS — L97.212 NON-PRESSURE CHRONIC ULCER OF RIGHT CALF WITH FAT LAYER EXPOSED (HCC): ICD-10-CM

## 2023-02-13 DIAGNOSIS — L97.919 VENOUS ULCER OF RIGHT LOWER EXTREMITY WITHOUT VARICOSE VEINS (HCC): Primary | ICD-10-CM

## 2023-02-13 RX ORDER — LIDOCAINE 40 MG/G
CREAM TOPICAL ONCE
Status: COMPLETED | OUTPATIENT
Start: 2023-02-13 | End: 2023-02-13

## 2023-02-13 RX ADMIN — LIDOCAINE: 40 CREAM TOPICAL at 16:17

## 2023-02-13 NOTE — PROGRESS NOTES
Cast Application    Date/Time: 2/13/2023 4:27 PM  Performed by: Robina Higuera RN  Authorized by: Maximiliano Kee MD   Universal Protocol:  Consent: Verbal consent obtained  Consent given by: patient  Patient identity confirmed: verbally with patient      Pre-procedure details:     Sensation:  Unchanged  Procedure details:     Laterality:  Right    Location:  Leg    Leg:  R lower legCast type:  Unna boot      Supplies:  2 layer wrap (endoform AM, hydrofera blue, kerlix, unna, coban, tubifast yellow)  Post-procedure details:     Pain:  Improved    Sensation:  Unchanged    Patient tolerance of procedure: Tolerated well, no immediate complications  Comments:      UNNA BOOT wrap procedure     Before application, ARACELI and/or TBI determined to be adequate for healing and application of compression  Lower extremity washed prior to application of compression wrap  With the foot in dorsiflexed position, unna boot was applied as per physician orders without complications or complaint of pain  The procedure was tolerated well  Toes warm & pink post application  Patient provided education & reinforced to observe toes for any discoloration, swelling or tingling and instructed when to report to the [address] or to remove compression  Wound care as per providers orders, patient tolerated well

## 2023-02-13 NOTE — PATIENT INSTRUCTIONS
Orders Placed This Encounter   Procedures    Wound cleansing and dressings     Shower yes; use cast cover over unna boot; DO NOT GET WOUND OR DRESSING WET     Right Leg Wounds:  Apply endoform AM to the wound beds  Cover with hydrofera blue  (tape hydrofera blue in place with medfix tape )  Secure with kerlix and tape  Secure with [employer]  Change dressing once a week in wound center     If you notice numbness, tingling, or toes are cold to touch call the wound center right away  If it is after hours go to the nearest ER  Consume 3-4 servings of protein daily  Follow up at the wound center in 1 week          Today's Treatment:  Cleansed with soap and water, and redressed as ordered above     Standing Status:   Future     Standing Expiration Date:   2/13/2024

## 2023-02-13 NOTE — PROGRESS NOTES
Patient ID: Olivia Bourgeois is a 56 y o  male Date of Birth 1965       Chief Complaint   Patient presents with   • Follow Up Wound Care Visit     RLE wound       Allergies:  Cat hair extract    Diagnosis:      Diagnosis ICD-10-CM Associated Orders   1  Venous ulcer of right lower extremity without varicose veins (HCC)  I87 2 lidocaine (LMX) 4 % cream    L97 919 Wound cleansing and dressings     Cast Application      2  Bilateral edema of lower extremity  R60 0 Wound cleansing and dressings      3  Non-pressure chronic ulcer of right calf with fat layer exposed ([address] )  L97 212               Assessment & Plan:  Areas of improvement on the right lateral calf venous stasis ulcer  Other area with no change  Patient did not feel that the Coflex was as good as the [employer]  Discontinue previous treatment  Start Endoform AM and Hydrofera Blue with [employer]  Subjective:   12/1/22:  1st visit for this 72-year-old male referred to the wound center because of multiple ulcerations of both lower extremities  Patient states that he has had these for 2-3 months and most of then start as blisters  Some he believe started as irritation from is toe all work boot  However, he also has ulcerations on the calfs above the work boot  He does have swelling of both lower extremities  The patient was treated for cellulitis by his primary care physician  Last time he had any ulcers on his leg was approximately one year ago  He does wear compression stockings but since his skin is broken down, he has not worn them  12/8/2022: Follow-up venous stasis ulcers of both lower extremities  Edema of both lower extremities  Unna boots have been used and he has had no problem with them  He noticed that the edema is starting to decrease and the ulcers are also improving  No pain, fever or chills  12/16/2022: Follow-up venous stasis ulcers of the right lower extremity    At last visit the left was healed and he is now wearing compression stocking  No complaints  Tolerating Unna boot  12/22/2022: Follow-up venous stasis ulcers of the right lateral calf  Doing well  No complaints  No pain  Tolerating Unna boot  12/30/2022: Follow-up venous stasis ulcer of the right lateral calf  No complaints  1/23/2023: Patient was recently discharged from the wound center after closure of venous stasis ulcers of the right lateral calf  He has been using his compression stockings daily  He noticed a week ago that he had breakdown on the right calf in the same area  He has been using [employer] and dry dressing  He arrived without any stocking in place  1/30/2023: Follow-up venous stasis ulcers of the right lateral calf  Doing well with [employer]  He has compression stocking 15 to 20 mmHg  Not wearing though because of Unna boot  No new complaints  2/6/2023: Follow-up venous stasis ulcer of the right lateral calf  No complaints  Doing well  No fever or chills  2/13/2023: Follow-up venous stasis ulcers of the right lateral calf  At last visit, we changed over to Coflex instead of the [employer]  [place] that the Coflex was not as tight as the [place]  No other complaints        The following portions of the patient's history were reviewed and updated as appropriate:   Patient Active Problem List   Diagnosis   • Benign essential HTN   • HLD (hyperlipidemia)   • Hypothyroid   • Asthma   • Primary osteoarthritis of both knees   • Obesity, Class III, BMI 40-49 9 (morbid obesity) ([address] )   • Cellulitis of right leg   • Leg wound, right   • Prediabetes   • Venous ulcer of left lower extremity without varicose veins (HCC)     Past Medical History:   Diagnosis Date   • Asthma    • Disease of thyroid gland    • Hematoma of left axilla     Last Assessed:9/23/2015   • Hypertension    • Hyperthyroidism    • Hypothyroidism    • Toxic multinodular goiter     Last Assessed:12/26/2013     Past Surgical History:   Procedure Laterality Date   • KNEE ARTHROSCOPY Left    • THYROID SURGERY      Ablation     Family History   Problem Relation Age of Onset   • Diabetes Mother         mellitus   • Parkinsonism Father    • Hypothyroidism Sister    • Cancer Maternal Grandmother    • Diabetes Maternal Aunt    • Diabetes Maternal Uncle    • Heart disease Neg Hx    • Stroke Neg Hx       Social History     Socioeconomic History   • Marital status: /Civil Union     Spouse name: None   • Number of children: None   • Years of education: None   • Highest education level: None   Occupational History   • Occupation:    Tobacco Use   • Smoking status: Former     Packs/day: 0 75     Years: 15 00     Pack years: 11 25     Types: Cigarettes     Quit date: 2007     Years since quittin 1   • Smokeless tobacco: Never   Vaping Use   • Vaping Use: Never used   Substance and Sexual Activity   • Alcohol use:  Yes     Alcohol/week: 2 0 standard drinks     Types: 2 Cans of beer per week     Comment: Social drinker    • Drug use: No   • Sexual activity: Yes   Other Topics Concern   • None   Social History Narrative    Daily coffee consumption/6 cups a day    Daily cola consumption/1 can a day    Recreational activities hiking    Travel History/Pt denies being out of the country during 2014-2014     Social Determinants of Health     Financial Resource Strain: Not on file   Food Insecurity: Not on file   Transportation Needs: Not on file   Physical Activity: Not on file   Stress: Not on file   Social Connections: Not on file   Intimate Partner Violence: Not on file   Housing Stability: Not on file        Current Outpatient Medications:   •  atorvastatin (LIPITOR) 20 mg tablet, Take 1 tablet (20 mg total) by mouth daily, Disp: 90 tablet, Rfl: 1  •  Fluticasone Propionate, Inhal, (Flovent Diskus) 250 MCG/BLIST AEPB, Inhale 1 puff daily, Disp: 60 blister, Rfl: 2  •  levothyroxine 200 mcg tablet, Take 1 tablet (200 mcg total) by mouth daily, Disp: 90 tablet, Rfl: 1  • levothyroxine 75 mcg tablet, Take 1 tablet (75 mcg total) by mouth daily, Disp: 90 tablet, Rfl: 1  •  losartan (COZAAR) 50 mg tablet, Take 1 tablet (50 mg total) by mouth daily, Disp: 90 tablet, Rfl: 1  •  metFORMIN (GLUCOPHAGE-XR) 500 mg 24 hr tablet, Take 1 tablet (500 mg total) by mouth daily with dinner, Disp: 90 tablet, Rfl: 1  •  potassium chloride (K-DUR,KLOR-CON) 20 mEq tablet, Take 1 tablet (20 mEq total) by mouth daily, Disp: 30 tablet, Rfl: 0  No current facility-administered medications for this visit  Review of Systems   Constitutional: Negative for chills, fatigue and fever  HENT: Negative for congestion, hearing loss and postnasal drip  Respiratory: Negative for cough and shortness of breath  Cardiovascular: Positive for leg swelling (Bilateral)  Musculoskeletal: Negative for gait problem  Skin: Positive for wound (Right calf)  Negative for rash  Neurological: Negative for numbness  Hematological: Does not bruise/bleed easily  Psychiatric/Behavioral: Negative  Objective:  /86   Pulse 64   Temp 98 3 °F (36 8 °C)   Resp 18   Pain Score: 0-No pain     Physical Exam  Vitals and nursing note reviewed  Constitutional:       Appearance: Normal appearance  He is well-developed  He is morbidly obese  HENT:      Head: Normocephalic and atraumatic  Cardiovascular:      Rate and Rhythm: Normal rate  Pulmonary:      Effort: Pulmonary effort is normal    Skin:     General: Skin is warm and dry  Findings: Wound present  No erythema  Comments: VSU of the right lateral calf  Multiple  Increased serous drainage  Increased skin islands  Bilateral edema  No signs of infection  See full wound description  No malodor  Neurological:      Mental Status: He is alert and oriented to person, place, and time     Psychiatric:         Attention and Perception: Attention normal          Mood and Affect: Mood and affect normal          Behavior: Behavior is cooperative  Cognition and Memory: Cognition normal                  Wound 12/01/22 Venous Ulcer Leg Right;Lateral (Active)   Wound Image Images linked 02/13/23 1552   Wound Description Granulation tissue;Pink;Beefy red 02/13/23 1555   Sada-wound Assessment Edema;Scaly;Dry 02/13/23 1555   Wound Length (cm) 7 cm 02/13/23 1555   Wound Width (cm) 5 5 cm 02/13/23 1555   Wound Depth (cm) 0 1 cm 02/13/23 1555   Wound Surface Area (cm^2) 38 5 cm^2 02/13/23 1555   Wound Volume (cm^3) 3 85 cm^3 02/13/23 1555   Calculated Wound Volume (cm^3) 3 85 cm^3 02/13/23 1555   Change in Wound Size % 3 75 02/13/23 1555   Drainage Amount Moderate 02/13/23 1555   Drainage Description Tan;Yellow; Foul smelling 02/13/23 1555   Non-staged Wound Description Full thickness 02/13/23 1555   Treatments Cleansed 02/13/23 1555       Wound 01/23/23 Venous Ulcer Leg Right; Anterior (Active)   Wound Image Images linked 02/13/23 1552   Wound Description Beefy red;Granulation tissue; Epithelialization 02/13/23 1554   Sada-wound Assessment Dry;Scaly;Edema 02/13/23 1554   Wound Length (cm) 2 cm 02/13/23 1554   Wound Width (cm) 2 4 cm 02/13/23 1554   Wound Depth (cm) 0 1 cm 02/13/23 1554   Wound Surface Area (cm^2) 4 8 cm^2 02/13/23 1554   Wound Volume (cm^3) 0 48 cm^3 02/13/23 1554   Calculated Wound Volume (cm^3) 0 48 cm^3 02/13/23 1554   Change in Wound Size % -380 02/13/23 1554   Drainage Amount Moderate 02/13/23 1554   Drainage Description Tan;Yellow; Foul smelling 02/13/23 1554   Non-staged Wound Description Full thickness 02/13/23 1554   Treatments Cleansed 02/13/23 1554                  Lab Results   Component Value Date    HGBA1C 6 2 11/01/2022       Wound Instructions:  Orders Placed This Encounter   Procedures   • Wound cleansing and dressings     Shower yes; use cast cover over unna boot; DO NOT GET WOUND OR DRESSING WET     Right Leg Wounds:  Apply endoform AM to the wound beds  Cover with hydrofera blue   (tape hydrofera blue in place with medfix tape )  Secure with kerlix and tape  Secure with [employer]  Change dressing once a week in wound center     If you notice numbness, tingling, or toes are cold to touch call the wound center right away  If it is after hours go to the nearest ER        Consume 3-4 servings of protein daily      Follow up at the wound center in 1 week         Today's Treatment:  Cleansed with soap and water, and redressed as ordered above     Standing Status:   Future     Standing Expiration Date:   5/99/8655   • Cast Application     This order was created via procedure documentation             Thaddeus Walsh MD, CHT, CWS    Portions of the record may have been created with voice recognition software  Occasional wrong word or "sound alike" substitutions may have occurred due to the inherent limitations of voice recognition software  Read the chart carefully and recognize, using context, where substitutions have occurred

## 2023-02-18 ENCOUNTER — APPOINTMENT (OUTPATIENT)
Dept: LAB | Facility: MEDICAL CENTER | Age: 58
End: 2023-02-18

## 2023-02-18 DIAGNOSIS — R73.03 PREDIABETES: ICD-10-CM

## 2023-02-18 DIAGNOSIS — E03.9 HYPOTHYROIDISM, UNSPECIFIED TYPE: ICD-10-CM

## 2023-02-18 LAB
GLUCOSE P FAST SERPL-MCNC: 96 MG/DL (ref 65–99)
T4 FREE SERPL-MCNC: 0.94 NG/DL (ref 0.76–1.46)
TSH SERPL DL<=0.05 MIU/L-ACNC: 5.04 UIU/ML (ref 0.45–4.5)

## 2023-02-20 ENCOUNTER — OFFICE VISIT (OUTPATIENT)
Dept: WOUND CARE | Facility: CLINIC | Age: 58
End: 2023-02-20

## 2023-02-20 VITALS
TEMPERATURE: 97.2 F | SYSTOLIC BLOOD PRESSURE: 170 MMHG | HEART RATE: 72 BPM | DIASTOLIC BLOOD PRESSURE: 108 MMHG | RESPIRATION RATE: 18 BRPM

## 2023-02-20 DIAGNOSIS — I87.2 VENOUS ULCER OF RIGHT LOWER EXTREMITY WITHOUT VARICOSE VEINS (HCC): Primary | ICD-10-CM

## 2023-02-20 DIAGNOSIS — L97.212 NON-PRESSURE CHRONIC ULCER OF RIGHT CALF WITH FAT LAYER EXPOSED (HCC): ICD-10-CM

## 2023-02-20 DIAGNOSIS — L97.919 VENOUS ULCER OF RIGHT LOWER EXTREMITY WITHOUT VARICOSE VEINS (HCC): Primary | ICD-10-CM

## 2023-02-20 NOTE — PROGRESS NOTES
Cast Application    Date/Time: 2/20/2023 3:16 PM  Performed by: Shira Ramos RN  Authorized by: Silvia Moore MD   Universal Protocol:  Consent: Verbal consent obtained  Consent given by: patient  Time out: Immediately prior to procedure a "time out" was called to verify the correct patient, procedure, equipment, support staff and site/side marked as required  Timeout called at: 2/20/2023 3:16 PM   Patient understanding: patient states understanding of the procedure being performed  Patient identity confirmed: verbally with patient      Pre-procedure details:     Sensation:  Normal  Procedure details:     Laterality:  Right    Location:  Leg    Leg:  R lower legCast type:  Unna boot      Supplies used: 1 [place] paste, 1 coban, 1 tubifast   Post-procedure details:     Pain:  Improved    Sensation:  Normal    Patient tolerance of procedure: Tolerated well, no immediate complications  Comments:      UNNA BOOT wrap procedure     Before application, ARACELI and/or TBI determined to be adequate for healing and application of compression  Lower extremity washed prior to application of compression wrap  With the foot in dorsiflexed position, unna boot was applied as per physician orders without complications or complaint of pain  The procedure was tolerated well  Toes warm & pink post application  Patient provided education & reinforced to observe toes for any discoloration, swelling or tingling and instructed when to report to the [address] or to remove compression  Wound care as per providers orders, patient tolerated well

## 2023-02-20 NOTE — PATIENT INSTRUCTIONS
Orders Placed This Encounter   Procedures    Wound cleansing and dressings     Shower yes; use cast cover over unna boot; DO NOT GET WOUND OR DRESSING WET     Right Leg Wounds:  STOP endoform am and hydrofera blue  Apply Lac Hydrin to right leg  Apply Unna boot  Change dressing once a week in wound center     If you notice numbness, tingling, or toes are cold to touch call the wound center right away  If it is after hours go to the nearest ER  Consume 3-4 servings of protein daily  Nurse visit in 1 week for Unna boot change  Follow up at the wound center in 2 weeks with Dr Pam Onofre    Bring compression stocking with you for your next appointment with Dr Pam Onofre      Today's Treatment:  Cleansed with soap and water, and redressed as ordered above    Stocking heel to back of the knee measurement is 46 cm     Standing Status:   Future     Standing Expiration Date:   9/54/8267    Cast Application     This order was created via procedure documentation

## 2023-02-20 NOTE — PROGRESS NOTES
Patient ID: Damion Reynoso is a 56 y o  male Date of Birth 1965       Chief Complaint   Patient presents with   • Follow Up Wound Care Visit     Venous ulcer of right lower extremity without varicose veins (HCC)       Allergies:  Cat hair extract    Diagnosis:      Diagnosis ICD-10-CM Associated Orders   1  Venous ulcer of right lower extremity without varicose veins (HCC)  I87 2 Wound cleansing and dressings    I33 780 Cast Application      2  Non-pressure chronic ulcer of right calf with fat layer exposed ([address] )  L96 318               Assessment & Plan:  Venous stasis ulcer of the right lateral calf  Almost completely healed with new epithelization  Small pinpoint area anteriorly  Lac-Hydrin and Unna boot for another week  At that time he will have an RN visit for Rosalina Engel & Ihsan santiago and I will see him in 2 weeks  Hopefully by that time the healing will have progressed to the point where he can transition back into his compression stocking  Must remember that last time he was transitioned to his compression stocking, he completely broke down again within a week  Subjective:   12/1/22:  1st visit for this 56-year-old male referred to the wound center because of multiple ulcerations of both lower extremities  Patient states that he has had these for 2-3 months and most of then start as blisters  Some he believe started as irritation from is toe all work boot  However, he also has ulcerations on the calfs above the work boot  He does have swelling of both lower extremities  The patient was treated for cellulitis by his primary care physician  Last time he had any ulcers on his leg was approximately one year ago  He does wear compression stockings but since his skin is broken down, he has not worn them  12/8/2022: Follow-up venous stasis ulcers of both lower extremities  Edema of both lower extremities  Unna boots have been used and he has had no problem with them    He noticed that the edema is starting to decrease and the ulcers are also improving  No pain, fever or chills  12/16/2022: Follow-up venous stasis ulcers of the right lower extremity  At last visit the left was healed and he is now wearing compression stocking  No complaints  Tolerating Unna boot  12/22/2022: Follow-up venous stasis ulcers of the right lateral calf  Doing well  No complaints  No pain  Tolerating Unna boot  12/30/2022: Follow-up venous stasis ulcer of the right lateral calf  No complaints  1/23/2023: Patient was recently discharged from the wound center after closure of venous stasis ulcers of the right lateral calf  He has been using his compression stockings daily  He noticed a week ago that he had breakdown on the right calf in the same area  He has been using [employer] and dry dressing  He arrived without any stocking in place  1/30/2023: Follow-up venous stasis ulcers of the right lateral calf  Doing well with [employer]  He has compression stocking 15 to 20 mmHg  Not wearing though because of Unna boot  No new complaints  2/6/2023: Follow-up venous stasis ulcer of the right lateral calf  No complaints  Doing well  No fever or chills  2/13/2023: Follow-up venous stasis ulcers of the right lateral calf  At last visit, we changed over to Coflex instead of the [employer]  Natoma that the Coflex was not as tight as the Unna     No other complaints  2/20/2023: Follow-up venous stasis ulcer of the right lateral calf  Did well with Unna boot  No new complaints        The following portions of the patient's history were reviewed and updated as appropriate:   Patient Active Problem List   Diagnosis   • Benign essential HTN   • HLD (hyperlipidemia)   • Hypothyroid   • Asthma   • Primary osteoarthritis of both knees   • Obesity, Class III, BMI 40-49 9 (morbid obesity) ([facility] [address] )   • Cellulitis of right leg   • Leg wound, right   • Prediabetes   • Venous ulcer of left lower extremity without varicose veins ([address] )     Past Medical History:   Diagnosis Date   • Asthma    • Disease of thyroid gland    • Hematoma of left axilla     Last Assessed:2015   • Hypertension    • Hyperthyroidism    • Hypothyroidism    • Toxic multinodular goiter     Last Assessed:2013     Past Surgical History:   Procedure Laterality Date   • KNEE ARTHROSCOPY Left    • THYROID SURGERY      Ablation     Family History   Problem Relation Age of Onset   • Diabetes Mother         mellitus   • Parkinsonism Father    • Hypothyroidism Sister    • Cancer Maternal Grandmother    • Diabetes Maternal Aunt    • Diabetes Maternal Uncle    • Heart disease Neg Hx    • Stroke Neg Hx       Social History     Socioeconomic History   • Marital status: /Civil Union     Spouse name: Not on file   • Number of children: Not on file   • Years of education: Not on file   • Highest education level: Not on file   Occupational History   • Occupation:    Tobacco Use   • Smoking status: Former     Packs/day: 0 75     Years: 15 00     Pack years: 11 25     Types: Cigarettes     Quit date: 2007     Years since quittin 1   • Smokeless tobacco: Never   Vaping Use   • Vaping Use: Never used   Substance and Sexual Activity   • Alcohol use:  Yes     Alcohol/week: 2 0 standard drinks     Types: 2 Cans of beer per week     Comment: Social drinker    • Drug use: No   • Sexual activity: Yes   Other Topics Concern   • Not on file   Social History Narrative    Daily coffee consumption/6 cups a day    Daily cola consumption/1 can a day    Recreational activities hiking    Travel History/Pt denies being out of the country during 2014-2014     Social Determinants of Health     Financial Resource Strain: Not on file   Food Insecurity: Not on file   Transportation Needs: Not on file   Physical Activity: Not on file   Stress: Not on file   Social Connections: Not on file   Intimate Partner Violence: Not on file   Housing Stability: Not on file Current Outpatient Medications:   •  atorvastatin (LIPITOR) 20 mg tablet, Take 1 tablet (20 mg total) by mouth daily, Disp: 90 tablet, Rfl: 1  •  Fluticasone Propionate, Inhal, (Flovent Diskus) 250 MCG/BLIST AEPB, Inhale 1 puff daily, Disp: 60 blister, Rfl: 2  •  levothyroxine 200 mcg tablet, Take 1 tablet (200 mcg total) by mouth daily, Disp: 90 tablet, Rfl: 1  •  levothyroxine 75 mcg tablet, Take 1 tablet (75 mcg total) by mouth daily, Disp: 90 tablet, Rfl: 1  •  losartan (COZAAR) 50 mg tablet, Take 1 tablet (50 mg total) by mouth daily, Disp: 90 tablet, Rfl: 1  •  metFORMIN (GLUCOPHAGE-XR) 500 mg 24 hr tablet, Take 1 tablet (500 mg total) by mouth daily with dinner, Disp: 90 tablet, Rfl: 1  •  potassium chloride (K-DUR,KLOR-CON) 20 mEq tablet, Take 1 tablet (20 mEq total) by mouth daily, Disp: 30 tablet, Rfl: 0    Review of Systems   Constitutional: Negative for chills, fatigue and fever  HENT: Negative for congestion, hearing loss and postnasal drip  Respiratory: Negative for cough and shortness of breath  Cardiovascular: Positive for leg swelling (Bilateral)  Musculoskeletal: Negative for gait problem  Skin: Positive for wound (Right calf)  Negative for rash  Neurological: Negative for numbness  Hematological: Does not bruise/bleed easily  Psychiatric/Behavioral: Negative  Objective:  BP (!) 170/108   Pulse 72   Temp (!) 97 2 °F (36 2 °C)   Resp 18   Pain Score: 0-No pain     Physical Exam  Vitals and nursing note reviewed  Constitutional:       Appearance: Normal appearance  He is well-developed  He is morbidly obese  HENT:      Head: Normocephalic and atraumatic  Cardiovascular:      Rate and Rhythm: Normal rate  Pulmonary:      Effort: Pulmonary effort is normal    Skin:     General: Skin is warm and dry  Findings: Wound present  No erythema  Comments: VSU of the right lateral calf  Multiple  Appears just recently epithelialize    Pinpoint opening anteriorly  Dry skin  Bilateral edema  No signs of infection  See full wound description  No malodor  Neurological:      Mental Status: He is alert and oriented to person, place, and time  Psychiatric:         Attention and Perception: Attention normal          Mood and Affect: Mood and affect normal          Behavior: Behavior is cooperative  Cognition and Memory: Cognition normal                       Wound 12/01/22 Venous Ulcer Leg Right;Lateral (Active)   Wound Image Images linked 02/20/23 1444   Wound Description Epithelialization;Pink 02/20/23 1446   Sada-wound Assessment Edema; Maceration 02/20/23 1446   Wound Length (cm) 0 2 cm 02/20/23 1446   Wound Width (cm) 0 2 cm 02/20/23 1446   Wound Depth (cm) 0 1 cm 02/20/23 1446   Wound Surface Area (cm^2) 0 04 cm^2 02/20/23 1446   Wound Volume (cm^3) 0 004 cm^3 02/20/23 1446   Calculated Wound Volume (cm^3) 0 cm^3 02/20/23 1446   Change in Wound Size % 100 02/20/23 1446   Drainage Amount Moderate 02/20/23 1446   Drainage Description Serosanguineous 02/20/23 1446   Patient Tolerance Tolerated well 02/20/23 1446   Dressing Status Removed 02/20/23 1446       Wound 01/23/23 Venous Ulcer Leg Right; Anterior (Active)   Wound Image Images linked 02/20/23 1444   Wound Description Epithelialization 02/20/23 1446   Sada-wound Assessment Edema 02/20/23 1446   Wound Length (cm) 0 1 cm 02/20/23 1446   Wound Width (cm) 0 1 cm 02/20/23 1446   Wound Depth (cm) 0 1 cm 02/20/23 1446   Wound Surface Area (cm^2) 0 01 cm^2 02/20/23 1446   Wound Volume (cm^3) 0 001 cm^3 02/20/23 1446   Calculated Wound Volume (cm^3) 0 cm^3 02/20/23 1446   Change in Wound Size % 100 02/20/23 1446   Drainage Amount None 02/20/23 1446   Patient Tolerance Tolerated well 02/20/23 1446   Dressing Status Removed 02/20/23 1446       Wound 02/20/23 Venous Ulcer Pretibial Right; Anterior (Active)   Wound Image Images linked 02/20/23 1453   Wound Description Pink 02/20/23 1453   Sada-wound Assessment Edema 02/20/23 1453   Wound Length (cm) 0 1 cm 02/20/23 1453   Wound Width (cm) 0 1 cm 02/20/23 1453   Wound Depth (cm) 0 2 cm 02/20/23 1453   Wound Surface Area (cm^2) 0 01 cm^2 02/20/23 1453   Wound Volume (cm^3) 0 002 cm^3 02/20/23 1453   Calculated Wound Volume (cm^3) 0 cm^3 02/20/23 1453   Drainage Amount Small 02/20/23 1453   Drainage Description Serosanguineous 02/20/23 1453   Patient Tolerance Tolerated well 02/20/23 1453   Dressing Status Removed 02/20/23 1453                 Lab Results   Component Value Date    HGBA1C 6 2 11/01/2022       Wound Instructions:  Orders Placed This Encounter   Procedures   • Wound cleansing and dressings     Shower yes; use cast cover over unna boot; DO NOT GET WOUND OR DRESSING WET     Right Leg Wounds:  STOP endoform am and hydrofera blue  Apply Lac Hydrin to right leg  Apply Unna boot  Change dressing once a week in wound center     If you notice numbness, tingling, or toes are cold to touch call the wound center right away  If it is after hours go to the nearest ER        Consume 3-4 servings of protein daily      Nurse visit in 1 week for Unna boot change  Follow up at the wound center in 2 weeks with Dr Ahmet Jones  Bring compression stocking with you for your next appointment with Dr Reji Clark Treatment:  Cleansed with soap and water, and redressed as ordered above    Stocking heel to back of the knee measurement is 46 cm     Standing Status:   Future     Standing Expiration Date:   0/04/2397   • Cast Application     This order was created via procedure documentation             Carrol Bryant MD, CHT, CWS    Portions of the record may have been created with voice recognition software  Occasional wrong word or "sound alike" substitutions may have occurred due to the inherent limitations of voice recognition software  Read the chart carefully and recognize, using context, where substitutions have occurred

## 2023-02-21 ENCOUNTER — RA CDI HCC (OUTPATIENT)
Dept: OTHER | Facility: HOSPITAL | Age: 58
End: 2023-02-21

## 2023-02-21 NOTE — PROGRESS NOTES
[facility]  coding opportunities          Chart Reviewed number of suggestions sent to Provider: 2   [id number]  E66 [id number]  This is a reminder to address ALL HCC (risk adjustment) codes as found on active problem list for 2023 as patient scores reset to zero MARILEE    Patients Insurance        Commercial Insurance: [address] [place] [address]

## 2023-02-22 LAB
EST. AVERAGE GLUCOSE BLD GHB EST-MCNC: 123 MG/DL
HBA1C MFR BLD: 5.9 %

## 2023-02-27 ENCOUNTER — CLINICAL SUPPORT (OUTPATIENT)
Dept: WOUND CARE | Facility: CLINIC | Age: 58
End: 2023-02-27

## 2023-02-27 VITALS
TEMPERATURE: 97.5 F | HEART RATE: 72 BPM | RESPIRATION RATE: 18 BRPM | DIASTOLIC BLOOD PRESSURE: 92 MMHG | SYSTOLIC BLOOD PRESSURE: 164 MMHG

## 2023-02-27 DIAGNOSIS — I87.2 VENOUS ULCER OF RIGHT LOWER EXTREMITY WITHOUT VARICOSE VEINS (HCC): Primary | ICD-10-CM

## 2023-02-27 DIAGNOSIS — L97.919 VENOUS ULCER OF RIGHT LOWER EXTREMITY WITHOUT VARICOSE VEINS (HCC): Primary | ICD-10-CM

## 2023-02-27 DIAGNOSIS — L97.212 NON-PRESSURE CHRONIC ULCER OF RIGHT CALF WITH FAT LAYER EXPOSED (HCC): ICD-10-CM

## 2023-02-27 NOTE — PROGRESS NOTES
Cast Application    Date/Time: 2/27/2023 2:53 PM  Performed by: Hansa Malin RN  Authorized by: Sravanthi Strickland MD   Universal Protocol:  Consent: Verbal consent obtained  Consent given by: patient  Time out: Immediately prior to procedure a "time out" was called to verify the correct patient, procedure, equipment, support staff and site/side marked as required  Timeout called at: 2/27/2023 2:53 PM   Patient understanding: patient states understanding of the procedure being performed  Patient identity confirmed: verbally with patient      Pre-procedure details:     Sensation:  Normal  Procedure details:     Laterality:  Right    Location:  Leg    Leg:  R lower legCast type:  Unna boot      Supplies used: 1 econo paste, 1 coban, 1 tubifast   Post-procedure details:     Pain:  Improved    Sensation:  Normal    Patient tolerance of procedure: Tolerated well, no immediate complications  Comments:      UNNA BOOT wrap procedure     Before application, ARACELI and/or TBI determined to be adequate for healing and application of compression  Lower extremity washed prior to application of compression wrap  With the foot in dorsiflexed position, unna boot was applied as per physician orders without complications or complaint of pain  The procedure was tolerated well  Toes warm & pink post application  Patient provided education & reinforced to observe toes for any discoloration, swelling or tingling and instructed when to report to the [address],[address] or to remove compression  Wound care as per providers orders, patient tolerated well

## 2023-02-28 ENCOUNTER — OFFICE VISIT (OUTPATIENT)
Dept: INTERNAL MEDICINE CLINIC | Age: 58
End: 2023-02-28

## 2023-02-28 VITALS
SYSTOLIC BLOOD PRESSURE: 134 MMHG | WEIGHT: 312 LBS | BODY MASS INDEX: 43.68 KG/M2 | HEIGHT: 71 IN | HEART RATE: 71 BPM | OXYGEN SATURATION: 97 % | TEMPERATURE: 97.9 F | DIASTOLIC BLOOD PRESSURE: 70 MMHG

## 2023-02-28 DIAGNOSIS — E03.9 HYPOTHYROIDISM, UNSPECIFIED TYPE: Chronic | ICD-10-CM

## 2023-02-28 DIAGNOSIS — R73.03 PREDIABETES: ICD-10-CM

## 2023-02-28 DIAGNOSIS — J45.20 MILD INTERMITTENT ASTHMA WITHOUT COMPLICATION: Chronic | ICD-10-CM

## 2023-02-28 DIAGNOSIS — I10 BENIGN ESSENTIAL HTN: Primary | Chronic | ICD-10-CM

## 2023-02-28 DIAGNOSIS — E78.2 MIXED HYPERLIPIDEMIA: Chronic | ICD-10-CM

## 2023-02-28 NOTE — PROGRESS NOTES
[facility] PRIMARY CARE [facility]:  ASSESSMENT/PLAN:  Diagnoses and all orders for this visit:    Benign Essential Hypertension:  · History of HTN; well controlled on current medication regimen  · Blood pressure in office today 134/70 mmHg  · Current home regimen: Losartan 20 mg daily   · Will continue on current regimen    Prediabetes:  · Most recent Hemoglobin A1c 5 9 on 2/18/2023, improved from 5 9   · Most recent: FBG 96 (2/2023) and Urine Microalbumin 36 7 (9/2022)  · Current home regimen: Metformin 500 mg daily   · Admits to inconsistent use of metformin; only approx 2x weekly   · Will continue on current regimen along with Losartan and Lipitor  · Advised lifestyle modifications including diet and exercise  · Repeat Hemoglobin A1c and FBG ordered and to be completed prior to next visit    Hypothyroidism:  · History of radioactive iodine induce hypothyroidism  · Most recent TSH 5 040 and Free T4 0 94  · Current home regimen: Levothyroxine 275 mcg daily   · Will continue on current regimen  · Repeat TSH ordered and to be completed prior to next visit    Mixed Hyperlipidemia:  · History of HLD; most recent lipid panel completed 9/3/2022  · Cholesterol 136, Triglycerides 114, HDL 36, and LDL 77   · Current home regimen: Lipitor 20 mg daily   · Will continue on current regimen along with lifestyle modifications    Mild Intermittent Asthma:  · History of asthma, well controlled on current regimen  · Mild wheezing on examination; no concern for acute exacerbation at this time  · Will continue on Flovent Diskus at this time  · Rarely utilizes albuterol PRN inhaler; encouraged to use when needed    Schedule a follow-up appointment in 4 months for continued follow up       CHIEF COMPLAINT: Routine Follow up     HISTORY OF PRESENT ILLNESS:  Mr Jailyn Banks is a 56year old male with a PMHx of HLD, hypothyroidism, HTN, asthma, prediabetes, and obesity who presents to the office today, 2/28/2023, for routine follow up and laboratory review  Patient has thyroid and diabetes labs completed prior to today's visit  TSH high but free T4 WNL  A1c also improved  Labs reviewed with the patient  Patient asymptomatic and with no complaints today  Admits to compliance with medications - all except metformin  Patient states that he often forgets to take metformin as it is his only medication to take at night  However, okay to take medication in the morning or at any point throughout the day  The following portions of the patient's history were reviewed and updated as appropriate: allergies, current medications, past family history, past medical history, past social history, past surgical history and problem list     Review of Systems   Constitutional: Negative for activity change, appetite change, fatigue and unexpected weight change  Eyes: Negative for visual disturbance  Respiratory: Positive for wheezing  Negative for cough, chest tightness and shortness of breath  Cardiovascular: Negative for chest pain, palpitations and leg swelling  Gastrointestinal: Negative for abdominal distention, constipation, diarrhea, nausea and vomiting  Endocrine: Negative for polydipsia, polyphagia and polyuria  Musculoskeletal: Negative for back pain and gait problem  Skin: Negative for color change and pallor  Neurological: Negative for dizziness, weakness, light-headedness, numbness and headaches  Psychiatric/Behavioral: Negative for agitation and confusion  OBJECTIVE:  Vitals:    02/28/23 1604   BP: 134/70   BP Location: Left arm   Patient Position: Sitting   Cuff Size: Large   Pulse: 71   Temp: 97 9 °F (36 6 °C)   TempSrc: Temporal   SpO2: 97%   Weight: (!) 142 kg (312 lb)   Height: 5' 11" (1 803 m)     Physical Exam  Constitutional:       General: He is not in acute distress  Appearance: He is obese  He is not ill-appearing or toxic-appearing  HENT:      Head: Normocephalic and atraumatic        Nose: Nose normal  No congestion  Mouth/Throat:      Mouth: Mucous membranes are moist       Pharynx: Oropharynx is clear  No oropharyngeal exudate  Eyes:      General: No scleral icterus  Conjunctiva/sclera: Conjunctivae normal    Cardiovascular:      Rate and Rhythm: Normal rate and regular rhythm  Pulses: Normal pulses  Heart sounds: Normal heart sounds  No murmur heard  No gallop  Pulmonary:      Effort: Pulmonary effort is normal       Breath sounds: Wheezing present  No rhonchi or rales  Abdominal:      General: Abdomen is flat  Bowel sounds are normal  There is no distension  Palpations: Abdomen is soft  Tenderness: There is no abdominal tenderness  There is no guarding  Hernia: No hernia is present  Musculoskeletal:         General: Normal range of motion  Cervical back: Normal range of motion  Right lower leg: Edema present  Left lower leg: Edema present  Comments: RLE bandaged - clean, dry, and intact   Skin:     Coloration: Skin is not jaundiced or pale  Neurological:      Mental Status: He is alert and oriented to person, place, and time     Psychiatric:         Mood and Affect: Mood normal          Behavior: Behavior normal            Current Outpatient Medications:   •  atorvastatin (LIPITOR) 20 mg tablet, Take 1 tablet (20 mg total) by mouth daily, Disp: 90 tablet, Rfl: 1  •  Fluticasone Propionate, Inhal, (Flovent Diskus) 250 MCG/BLIST AEPB, Inhale 1 puff daily, Disp: 60 blister, Rfl: 2  •  levothyroxine 200 mcg tablet, Take 1 tablet (200 mcg total) by mouth daily, Disp: 90 tablet, Rfl: 1  •  levothyroxine 75 mcg tablet, Take 1 tablet (75 mcg total) by mouth daily, Disp: 90 tablet, Rfl: 1  •  losartan (COZAAR) 50 mg tablet, Take 1 tablet (50 mg total) by mouth daily, Disp: 90 tablet, Rfl: 1  •  metFORMIN (GLUCOPHAGE-XR) 500 mg 24 hr tablet, Take 1 tablet (500 mg total) by mouth daily with dinner, Disp: 90 tablet, Rfl: 1  •  potassium chloride (K-DUR,KLOR-CON) 20 mEq tablet, Take 1 tablet (20 mEq total) by mouth daily, Disp: 30 tablet, Rfl: 0    Past Medical History:   Diagnosis Date   • Asthma    • Disease of thyroid gland    • Hematoma of left axilla     Last Assessed:2015   • Hypertension    • Hyperthyroidism    • Hypothyroidism    • Toxic multinodular goiter     Last Assessed:2013     Past Surgical History:   Procedure Laterality Date   • KNEE ARTHROSCOPY Left    • THYROID SURGERY      Ablation     Social History     Socioeconomic History   • Marital status: /Civil Union     Spouse name: Not on file   • Number of children: Not on file   • Years of education: Not on file   • Highest education level: Not on file   Occupational History   • Occupation:    Tobacco Use   • Smoking status: Former     Packs/day: 0 75     Years: 15 00     Pack years: 11 25     Types: Cigarettes     Start date: 1992     Quit date: 2007     Years since quittin 1   • Smokeless tobacco: Never   Vaping Use   • Vaping Use: Never used   Substance and Sexual Activity   • Alcohol use:  Yes     Alcohol/week: 2 0 standard drinks     Types: 2 Cans of beer per week     Comment: Social drinker    • Drug use: No   • Sexual activity: Yes     Partners: Female     Birth control/protection: Female Sterilization   Other Topics Concern   • Not on file   Social History Narrative    Daily coffee consumption/6 cups a day    Daily cola consumption/1 can a day    Recreational activities hiking    Travel History/Pt denies being out of the country during 2014-2014     Social Determinants of Health     Financial Resource Strain: Not on file   Food Insecurity: Not on file   Transportation Needs: Not on file   Physical Activity: Not on file   Stress: Not on file   Social Connections: Not on file   Intimate Partner Violence: Not on file   Housing Stability: Not on file     Family History   Problem Relation Age of Onset   • Diabetes Mother         mellitus   • Parkinsonism Father    • Hypothyroidism Sister    • Cancer Maternal Grandmother    • Diabetes Maternal Aunt    • Diabetes Maternal Uncle    • Heart disease Neg Hx    • Stroke Neg Hx        ==  DO [facility] Internal Medicine PGY-3

## 2023-03-06 ENCOUNTER — OFFICE VISIT (OUTPATIENT)
Dept: WOUND CARE | Facility: CLINIC | Age: 58
End: 2023-03-06

## 2023-03-06 VITALS
TEMPERATURE: 98.2 F | RESPIRATION RATE: 20 BRPM | SYSTOLIC BLOOD PRESSURE: 180 MMHG | HEART RATE: 64 BPM | DIASTOLIC BLOOD PRESSURE: 90 MMHG

## 2023-03-06 DIAGNOSIS — R60.0 BILATERAL EDEMA OF LOWER EXTREMITY: ICD-10-CM

## 2023-03-06 DIAGNOSIS — I87.2 VENOUS ULCER OF RIGHT LOWER EXTREMITY WITHOUT VARICOSE VEINS (HCC): Primary | ICD-10-CM

## 2023-03-06 DIAGNOSIS — L97.212 NON-PRESSURE CHRONIC ULCER OF RIGHT CALF WITH FAT LAYER EXPOSED (HCC): ICD-10-CM

## 2023-03-06 DIAGNOSIS — L97.919 VENOUS ULCER OF RIGHT LOWER EXTREMITY WITHOUT VARICOSE VEINS (HCC): Primary | ICD-10-CM

## 2023-03-06 NOTE — PROGRESS NOTES
Cast Application    Date/Time: 3/6/2023 3:43 PM  Performed by: Marita Crook RN  Authorized by: Peola Kussmaul, MD   Universal Protocol:  Consent: Verbal consent obtained  Consent given by: patient  Timeout called at: 3/6/2023 3:43 PM   Patient identity confirmed: verbally with patient      Pre-procedure details:     Sensation:  Normal  Procedure details:     Laterality:  Right    Location:  Leg    Leg:  R lower legCast type:  Unna boot      Supplies:  2 layer wrap  Post-procedure details:     Pain:  Improved    Sensation:  Normal    Patient tolerance of procedure: Tolerated well, no immediate complications  Comments:      UNNA BOOT wrap procedure     Before application, ARACELI and/or TBI determined to be adequate for healing and application of compression  Lower extremity washed prior to application of compression wrap  With the foot in dorsiflexed position, unna boot was applied as per physician orders without complications or complaint of pain  The procedure was tolerated well  Toes warm & pink post application  Patient provided education & reinforced to observe toes for any discoloration, swelling or tingling and instructed when to report to the [address] [facility],[address] or to remove compression  Wound care as per providers orders, patient tolerated well

## 2023-03-06 NOTE — PROGRESS NOTES
Patient ID: Nelda Simmons is a 56 y o  male Date of Birth 1965       Chief Complaint   Patient presents with   • Follow Up Wound Care Visit     RLE wound       Allergies:  Cat hair extract    Diagnosis:      Diagnosis ICD-10-CM Associated Orders   1  Venous ulcer of right lower extremity without varicose veins (HCC)  I87 2 Wound cleansing and dressings    C23 218 Cast Application      2  Non-pressure chronic ulcer of right calf with fat layer exposed ([address] )  A40 867 Cast Application      3  Bilateral edema of lower extremity  R60 0               Assessment & Plan:  Venous stasis ulcer of the right calf  Only 1 pinpoint opening remains  Other 1 has closed  Lac-Hydrin and Unna boot for 1 more week  Advised patient to get longer stocking  Gave him the length measurements so his daughter can check online  Hopefully, next week he can transition into his compression stockings  Subjective:   12/1/22:  1st visit for this 72-year-old male referred to the wound center because of multiple ulcerations of both lower extremities  Patient states that he has had these for 2-3 months and most of then start as blisters  Some he believe started as irritation from is toe all work boot  However, he also has ulcerations on the calfs above the work boot  He does have swelling of both lower extremities  The patient was treated for cellulitis by his primary care physician  Last time he had any ulcers on his leg was approximately one year ago  He does wear compression stockings but since his skin is broken down, he has not worn them  12/8/2022: Follow-up venous stasis ulcers of both lower extremities  Edema of both lower extremities  Unna boots have been used and he has had no problem with them  He noticed that the edema is starting to decrease and the ulcers are also improving  No pain, fever or chills  12/16/2022: Follow-up venous stasis ulcers of the right lower extremity    At last visit the left was healed and he is now wearing compression stocking  No complaints  Tolerating Unna boot  12/22/2022: Follow-up venous stasis ulcers of the right lateral calf  Doing well  No complaints  No pain  Tolerating Unna boot  12/30/2022: Follow-up venous stasis ulcer of the right lateral calf  No complaints  1/23/2023: Patient was recently discharged from the wound center after closure of venous stasis ulcers of the right lateral calf  He has been using his compression stockings daily  He noticed a week ago that he had breakdown on the right calf in the same area  He has been using [employer] and dry dressing  He arrived without any stocking in place  1/30/2023: Follow-up venous stasis ulcers of the right lateral calf  Doing well with [employer]  He has compression stocking 15 to 20 mmHg  Not wearing though because of Unna boot  No new complaints  2/6/2023: Follow-up venous stasis ulcer of the right lateral calf  No complaints  Doing well  No fever or chills  2/13/2023: Follow-up venous stasis ulcers of the right lateral calf  At last visit, we changed over to Coflex instead of the [employer]  [place] that the Coflex was not as tight as the Unna     No other complaints  2/20/2023: Follow-up venous stasis ulcer of the right lateral calf  Did well with Unna boot  No new complaints  3/6/2023: Follow-up venous stasis ulcer of the right lateral calf  Unna boot  No new complaints  Has not gotten longer stockings yet        The following portions of the patient's history were reviewed and updated as appropriate:   Patient Active Problem List   Diagnosis   • Benign essential HTN   • HLD (hyperlipidemia)   • Hypothyroid   • Asthma   • Primary osteoarthritis of both knees   • Obesity, Class III, BMI 40-49 9 (morbid obesity) ([facility] [address] )   • Cellulitis of right leg   • Leg wound, right   • Prediabetes   • Venous ulcer of left lower extremity without varicose veins (HCC)     Past Medical History:   Diagnosis Date   • Asthma    • Disease of thyroid gland    • Hematoma of left axilla     Last Assessed:2015   • Hypertension    • Hyperthyroidism    • Hypothyroidism    • Toxic multinodular goiter     Last Assessed:2013     Past Surgical History:   Procedure Laterality Date   • KNEE ARTHROSCOPY Left    • THYROID SURGERY      Ablation     Family History   Problem Relation Age of Onset   • Diabetes Mother         mellitus   • Parkinsonism Father    • Hypothyroidism Sister    • Cancer Maternal Grandmother    • Diabetes Maternal Aunt    • Diabetes Maternal Uncle    • Heart disease Neg Hx    • Stroke Neg Hx       Social History     Socioeconomic History   • Marital status: /Civil Union     Spouse name: Not on file   • Number of children: Not on file   • Years of education: Not on file   • Highest education level: Not on file   Occupational History   • Occupation:    Tobacco Use   • Smoking status: Former     Packs/day: 0 75     Years: 15 00     Pack years: 11 25     Types: Cigarettes     Start date: 1992     Quit date: 2007     Years since quittin 1   • Smokeless tobacco: Never   Vaping Use   • Vaping Use: Never used   Substance and Sexual Activity   • Alcohol use:  Yes     Alcohol/week: 2 0 standard drinks     Types: 2 Cans of beer per week     Comment: Social drinker    • Drug use: No   • Sexual activity: Yes     Partners: Female     Birth control/protection: Female Sterilization   Other Topics Concern   • Not on file   Social History Narrative    Daily coffee consumption/6 cups a day    Daily cola consumption/1 can a day    Recreational activities hiking    Travel History/Pt denies being out of the country during 2014-2014     Social Determinants of Health     Financial Resource Strain: Not on file   Food Insecurity: Not on file   Transportation Needs: Not on file   Physical Activity: Not on file   Stress: Not on file   Social Connections: Not on file   Intimate Partner Violence: Not on file   Housing Stability: Not on file        Current Outpatient Medications:   •  atorvastatin (LIPITOR) 20 mg tablet, Take 1 tablet (20 mg total) by mouth daily, Disp: 90 tablet, Rfl: 1  •  Fluticasone Propionate, Inhal, (Flovent Diskus) 250 MCG/BLIST AEPB, Inhale 1 puff daily, Disp: 60 blister, Rfl: 2  •  levothyroxine 200 mcg tablet, Take 1 tablet (200 mcg total) by mouth daily, Disp: 90 tablet, Rfl: 1  •  levothyroxine 75 mcg tablet, Take 1 tablet (75 mcg total) by mouth daily, Disp: 90 tablet, Rfl: 1  •  losartan (COZAAR) 50 mg tablet, Take 1 tablet (50 mg total) by mouth daily, Disp: 90 tablet, Rfl: 1  •  metFORMIN (GLUCOPHAGE-XR) 500 mg 24 hr tablet, Take 1 tablet (500 mg total) by mouth daily with dinner, Disp: 90 tablet, Rfl: 1  •  potassium chloride (K-DUR,KLOR-CON) 20 mEq tablet, Take 1 tablet (20 mEq total) by mouth daily, Disp: 30 tablet, Rfl: 0    Review of Systems   Constitutional: Negative for chills, fatigue and fever  HENT: Negative for congestion, hearing loss and postnasal drip  Respiratory: Negative for cough and shortness of breath  Cardiovascular: Positive for leg swelling (Bilateral)  Musculoskeletal: Negative for gait problem  Skin: Positive for wound (Right calf)  Negative for rash  Neurological: Negative for numbness  Hematological: Does not bruise/bleed easily  Psychiatric/Behavioral: Negative  Objective:  BP (!) 180/90   Pulse 64   Temp 98 2 °F (36 8 °C)   Resp 20   Pain Score: 0-No pain     Physical Exam  Vitals and nursing note reviewed  Constitutional:       Appearance: Normal appearance  He is well-developed  He is morbidly obese  HENT:      Head: Normocephalic and atraumatic  Cardiovascular:      Rate and Rhythm: Normal rate  Pulmonary:      Effort: Pulmonary effort is normal    Skin:     General: Skin is warm and dry  Findings: Wound present  No erythema  Comments: VSU of the right lateral calf  1 pinpoint opening remains  Others have epithelialized  Still with some scaling  No signs of infection  Significant edema remains  Neurological:      Mental Status: He is alert and oriented to person, place, and time  Psychiatric:         Attention and Perception: Attention normal          Mood and Affect: Mood and affect normal          Behavior: Behavior is cooperative  Cognition and Memory: Cognition normal                       Wound 12/01/22 Venous Ulcer Leg Right;Lateral (Active)   Wound Image Images linked 03/06/23 1509   Wound Description Epithelialization;Pink 03/06/23 1520   Sada-wound Assessment Edema;Pink 03/06/23 1520   Wound Length (cm) 0 1 cm 03/06/23 1520   Wound Width (cm) 0 1 cm 03/06/23 1520   Wound Depth (cm) 0 1 cm 03/06/23 1520   Wound Surface Area (cm^2) 0 01 cm^2 03/06/23 1520   Wound Volume (cm^3) 0 001 cm^3 03/06/23 1520   Calculated Wound Volume (cm^3) 0 cm^3 03/06/23 1520   Change in Wound Size % 100 03/06/23 1520   Drainage Amount Scant 03/06/23 1520   Drainage Description Serous 03/06/23 1520   Non-staged Wound Description Full thickness 03/06/23 1520   Treatments Irrigation with NSS 03/06/23 1520       Wound 01/23/23 Venous Ulcer Leg Right; Anterior (Active)   Wound Image Images linked 03/06/23 1511   Wound Description Epithelialization 03/06/23 1520   Sada-wound Assessment Pink 03/06/23 1520   Wound Length (cm) 0 cm 03/06/23 1520   Wound Width (cm) 0 cm 03/06/23 1520   Wound Depth (cm) 0 cm 03/06/23 1520   Wound Surface Area (cm^2) 0 cm^2 03/06/23 1520   Wound Volume (cm^3) 0 cm^3 03/06/23 1520   Calculated Wound Volume (cm^3) 0 cm^3 03/06/23 1520   Change in Wound Size % 100 03/06/23 1520   Drainage Amount None 03/06/23 1520       Wound 02/20/23 Venous Ulcer Pretibial Right; Anterior (Active)   Wound Image Images linked 03/06/23 1510   Wound Description Epithelialization 03/06/23 1520   Sada-wound Assessment Edema;Pink 03/06/23 1520   Wound Length (cm) 0 cm 03/06/23 1520   Wound Width (cm) 0 cm 03/06/23 1520   Wound Depth (cm) 0 cm 03/06/23 1520   Wound Surface Area (cm^2) 0 cm^2 03/06/23 1520   Wound Volume (cm^3) 0 cm^3 03/06/23 1520   Calculated Wound Volume (cm^3) 0 cm^3 03/06/23 1520   Drainage Amount None 03/06/23 1520                 Lab Results   Component Value Date    HGBA1C 5 9 (H) 02/18/2023       Wound Instructions:  Orders Placed This Encounter   Procedures   • Wound cleansing and dressings     Wound cleansing and dressings    Shower yes; use cast cover over unna boot; DO NOT GET WOUND OR DRESSING WET     Right Leg Wounds:  STOP endoform am and hydrofera blue  Apply Lac Hydrin to right leg  Apply Unna boot  Change dressing once a week in wound center     If you notice numbness, tingling, or toes are cold to touch call the wound center right away  If it is after hours go to the nearest ER        Consume 3-4 servings of protein daily      Nurse visit in 1 week for Unna boot change  Follow up at the wound center in 2 weeks with Dr Christy Wheat  Bring compression stocking with you for your next appointment with Dr Manisha Albrecht Treatment:  Cleansed with soap and water, and redressed as ordered above     Stocking heel to back of the knee measurement is 46 cm  Order stocking for the length of the back of the leg     Standing Status:   Future     Standing Expiration Date:   5/3/4186   • Cast Application     This order was created via procedure documentation             Elmira Lance MD, CHT, CWS    Portions of the record may have been created with voice recognition software  Occasional wrong word or "sound alike" substitutions may have occurred due to the inherent limitations of voice recognition software  Read the chart carefully and recognize, using context, where substitutions have occurred

## 2023-03-13 ENCOUNTER — OFFICE VISIT (OUTPATIENT)
Dept: WOUND CARE | Facility: CLINIC | Age: 58
End: 2023-03-13

## 2023-03-13 VITALS
DIASTOLIC BLOOD PRESSURE: 92 MMHG | TEMPERATURE: 96.6 F | RESPIRATION RATE: 18 BRPM | HEART RATE: 78 BPM | SYSTOLIC BLOOD PRESSURE: 174 MMHG

## 2023-03-13 DIAGNOSIS — L97.919 VENOUS ULCER OF RIGHT LOWER EXTREMITY WITHOUT VARICOSE VEINS (HCC): Primary | ICD-10-CM

## 2023-03-13 DIAGNOSIS — I87.2 VENOUS ULCER OF RIGHT LOWER EXTREMITY WITHOUT VARICOSE VEINS (HCC): Primary | ICD-10-CM

## 2023-03-13 DIAGNOSIS — L97.212 NON-PRESSURE CHRONIC ULCER OF RIGHT CALF WITH FAT LAYER EXPOSED (HCC): ICD-10-CM

## 2023-03-13 NOTE — PATIENT INSTRUCTIONS
Orders Placed This Encounter   Procedures    Wound cleansing and dressings     Right lower leg is healed  No dressing needed  Shower yes, with mild soap and water (Dove)  Do not scrub  Pat dry  Moisturize after shower, before going to bed  Brady Rosenberg      Continue wearing compression socks daily  Apply first thing in the morning before walking  Can remove for sleeping  Elevate legs whenever sitting  Avoid foods high in sodium  Contact the wound center if your wounds reopen, or drainage is noticed       Standing Status:   Future     Standing Expiration Date:   3/13/2024

## 2023-03-13 NOTE — PROGRESS NOTES
Patient ID: Ingrid Richter is a 56 y o  male Date of Birth 1965       Chief Complaint   Patient presents with   • Follow Up Wound Care Visit     RLE wound       Allergies:  Cat hair extract    Diagnosis:      Diagnosis ICD-10-CM Associated Orders   1  Venous ulcer of right lower extremity without varicose veins (HCC)  I87 2 Wound cleansing and dressings    L97 919       2  Non-pressure chronic ulcer of right calf with fat layer exposed ([address] )  L97 212               Assessment & Plan:  Venous stasis ulcer of the right calf are now completely closed  Wear his compression stockings  Moisturize before bed  May remove his stockings at bedtime  Discharge from wound center  Subjective:   12/1/22:  1st visit for this 42-year-old male referred to the wound center because of multiple ulcerations of both lower extremities  Patient states that he has had these for 2-3 months and most of then start as blisters  Some he believe started as irritation from is toe all work boot  However, he also has ulcerations on the calfs above the work boot  He does have swelling of both lower extremities  The patient was treated for cellulitis by his primary care physician  Last time he had any ulcers on his leg was approximately one year ago  He does wear compression stockings but since his skin is broken down, he has not worn them  12/8/2022: Follow-up venous stasis ulcers of both lower extremities  Edema of both lower extremities  Unna boots have been used and he has had no problem with them  He noticed that the edema is starting to decrease and the ulcers are also improving  No pain, fever or chills  12/16/2022: Follow-up venous stasis ulcers of the right lower extremity  At last visit the left was healed and he is now wearing compression stocking  No complaints  Tolerating Unna boot  12/22/2022: Follow-up venous stasis ulcers of the right lateral calf  Doing well  No complaints  No pain    Tolerating Unna boot  12/30/2022: Follow-up venous stasis ulcer of the right lateral calf  No complaints  1/23/2023: Patient was recently discharged from the wound center after closure of venous stasis ulcers of the right lateral calf  He has been using his compression stockings daily  He noticed a week ago that he had breakdown on the right calf in the same area  He has been using [employer] and dry dressing  He arrived without any stocking in place  1/30/2023: Follow-up venous stasis ulcers of the right lateral calf  Doing well with [employer]  He has compression stocking 15 to 20 mmHg  Not wearing though because of Unna boot  No new complaints  2/6/2023: Follow-up venous stasis ulcer of the right lateral calf  No complaints  Doing well  No fever or chills  2/13/2023: Follow-up venous stasis ulcers of the right lateral calf  At last visit, we changed over to Coflex instead of the [employer]  [place] that the Coflex was not as tight as the Unna     No other complaints  2/20/2023: Follow-up venous stasis ulcer of the right lateral calf  Did well with Unna boot  No new complaints  3/6/2023: Follow-up venous stasis ulcer of the right lateral calf  Unna boot  No new complaints  Has not gotten longer stockings yet  3/13/2023: Follow-up venous stasis ulcer of the right lateral calf  At last visit, it appeared that the ulcer is completely closed but we wrapped him again  He has ordered longer length of compression stockings  He has not yet received them        The following portions of the patient's history were reviewed and updated as appropriate:   Patient Active Problem List   Diagnosis   • Benign essential HTN   • HLD (hyperlipidemia)   • Hypothyroid   • Asthma   • Primary osteoarthritis of both knees   • Obesity, Class III, BMI 40-49 9 (morbid obesity) ([facility] [address] )   • Cellulitis of right leg   • Leg wound, right   • Prediabetes   • Venous ulcer of left lower extremity without varicose veins ([address] ) Past Medical History:   Diagnosis Date   • Asthma    • Disease of thyroid gland    • Hematoma of left axilla     Last Assessed:2015   • Hypertension    • Hyperthyroidism    • Hypothyroidism    • Toxic multinodular goiter     Last Assessed:2013     Past Surgical History:   Procedure Laterality Date   • KNEE ARTHROSCOPY Left    • THYROID SURGERY      Ablation     Family History   Problem Relation Age of Onset   • Diabetes Mother         mellitus   • Parkinsonism Father    • Hypothyroidism Sister    • Cancer Maternal Grandmother    • Diabetes Maternal Aunt    • Diabetes Maternal Uncle    • Heart disease Neg Hx    • Stroke Neg Hx       Social History     Socioeconomic History   • Marital status: /Civil Union     Spouse name: None   • Number of children: None   • Years of education: None   • Highest education level: None   Occupational History   • Occupation:    Tobacco Use   • Smoking status: Former     Packs/day: 0 75     Years: 15 00     Pack years: 11 25     Types: Cigarettes     Start date: 1992     Quit date: 2007     Years since quittin 2   • Smokeless tobacco: Never   Vaping Use   • Vaping Use: Never used   Substance and Sexual Activity   • Alcohol use:  Yes     Alcohol/week: 2 0 standard drinks     Types: 2 Cans of beer per week     Comment: Social drinker    • Drug use: No   • Sexual activity: Yes     Partners: Female     Birth control/protection: Female Sterilization   Other Topics Concern   • None   Social History Narrative    Daily coffee consumption/6 cups a day    Daily cola consumption/1 can a day    Recreational activities hiking    Travel History/Pt denies being out of the country during 2014-2014     Social Determinants of Health     Financial Resource Strain: Not on file   Food Insecurity: Not on file   Transportation Needs: Not on file   Physical Activity: Not on file   Stress: Not on file   Social Connections: Not on file   Intimate Partner Violence: Not on file   Housing Stability: Not on file        Current Outpatient Medications:   •  atorvastatin (LIPITOR) 20 mg tablet, Take 1 tablet (20 mg total) by mouth daily, Disp: 90 tablet, Rfl: 1  •  Fluticasone Propionate, Inhal, (Flovent Diskus) 250 MCG/BLIST AEPB, Inhale 1 puff daily, Disp: 60 blister, Rfl: 2  •  levothyroxine 200 mcg tablet, Take 1 tablet (200 mcg total) by mouth daily, Disp: 90 tablet, Rfl: 1  •  levothyroxine 75 mcg tablet, Take 1 tablet (75 mcg total) by mouth daily, Disp: 90 tablet, Rfl: 1  •  losartan (COZAAR) 50 mg tablet, Take 1 tablet (50 mg total) by mouth daily, Disp: 90 tablet, Rfl: 1  •  metFORMIN (GLUCOPHAGE-XR) 500 mg 24 hr tablet, Take 1 tablet (500 mg total) by mouth daily with dinner, Disp: 90 tablet, Rfl: 1  •  potassium chloride (K-DUR,KLOR-CON) 20 mEq tablet, Take 1 tablet (20 mEq total) by mouth daily, Disp: 30 tablet, Rfl: 0    Review of Systems   Constitutional: Negative for chills, fatigue and fever  HENT: Negative for congestion, hearing loss and postnasal drip  Respiratory: Negative for cough and shortness of breath  Cardiovascular: Positive for leg swelling (Bilateral)  Musculoskeletal: Negative for gait problem  Skin: Positive for wound (Right calf)  Negative for rash  Neurological: Negative for numbness  Hematological: Does not bruise/bleed easily  Psychiatric/Behavioral: Negative  Objective:  BP (!) 174/92   Pulse 78   Temp (!) 96 6 °F (35 9 °C)   Resp 18   Pain Score: 0-No pain     Physical Exam  Vitals and nursing note reviewed  Constitutional:       Appearance: Normal appearance  He is well-developed  He is morbidly obese  HENT:      Head: Normocephalic and atraumatic  Cardiovascular:      Rate and Rhythm: Normal rate  Pulmonary:      Effort: Pulmonary effort is normal    Skin:     General: Skin is warm and dry  Findings: No erythema or wound               Comments: Complete epithelization of the ulcers of the right lateral calf    Neurological:      Mental Status: He is alert and oriented to person, place, and time  Psychiatric:         Attention and Perception: Attention normal          Mood and Affect: Mood and affect normal          Behavior: Behavior is cooperative  Cognition and Memory: Cognition normal              Wound 12/01/22 Venous Ulcer Leg Right;Lateral (Active)   Wound Image Images linked 03/13/23 1557   Wound Description Epithelialization 03/13/23 1556   Sada-wound Assessment Dry;Scaly;Edema 03/13/23 1556   Wound Length (cm) 0 cm 03/13/23 1556   Wound Width (cm) 0 cm 03/13/23 1556   Wound Depth (cm) 0 cm 03/13/23 1556   Wound Surface Area (cm^2) 0 cm^2 03/13/23 1556   Wound Volume (cm^3) 0 cm^3 03/13/23 1556   Calculated Wound Volume (cm^3) 0 cm^3 03/13/23 1556   Change in Wound Size % 100 03/13/23 1556   Drainage Amount None 03/13/23 1556   Treatments Cleansed 03/13/23 1556                  Lab Results   Component Value Date    HGBA1C 5 9 (H) 02/18/2023       Wound Instructions:  Orders Placed This Encounter   Procedures   • Wound cleansing and dressings     Right lower leg is healed  No dressing needed  Shower yes, with mild soap and water (Dove)  Do not scrub  Pat dry  Moisturize after shower, before going to bed  Randy Javid)      Continue wearing compression socks daily  Apply first thing in the morning before walking  Can remove for sleeping  Elevate legs whenever sitting  Avoid foods high in sodium  Contact the wound center if your wounds reopen, or drainage is noticed  Standing Status:   Future     Standing Expiration Date:   3/13/2024             Yara Regalado MD, CHT, CWS    Portions of the record may have been created with voice recognition software  Occasional wrong word or "sound alike" substitutions may have occurred due to the inherent limitations of voice recognition software   Read the chart carefully and recognize, using context, where substitutions have occurred

## 2023-06-21 DIAGNOSIS — I10 ESSENTIAL HYPERTENSION, BENIGN: ICD-10-CM

## 2023-06-21 RX ORDER — LOSARTAN POTASSIUM 50 MG/1
50 TABLET ORAL DAILY
Qty: 90 TABLET | Refills: 1 | Status: SHIPPED | OUTPATIENT
Start: 2023-06-21

## 2023-06-23 DIAGNOSIS — E78.00 HYPERCHOLESTEROLEMIA: ICD-10-CM

## 2023-06-23 DIAGNOSIS — E03.9 HYPOTHYROIDISM, UNSPECIFIED TYPE: ICD-10-CM

## 2023-06-23 RX ORDER — LEVOTHYROXINE SODIUM 0.07 MG/1
75 TABLET ORAL DAILY
Qty: 90 TABLET | Refills: 1 | Status: SHIPPED | OUTPATIENT
Start: 2023-06-23

## 2023-06-23 RX ORDER — ATORVASTATIN CALCIUM 20 MG/1
20 TABLET, FILM COATED ORAL DAILY
Qty: 90 TABLET | Refills: 1 | Status: SHIPPED | OUTPATIENT
Start: 2023-06-23

## 2023-06-23 RX ORDER — LEVOTHYROXINE SODIUM 0.2 MG/1
200 TABLET ORAL DAILY
Qty: 90 TABLET | Refills: 1 | Status: SHIPPED | OUTPATIENT
Start: 2023-06-23

## 2023-06-26 ENCOUNTER — RA CDI HCC (OUTPATIENT)
Dept: OTHER | Facility: HOSPITAL | Age: 58
End: 2023-06-26

## 2023-06-26 NOTE — PROGRESS NOTES
[facility]  coding opportunities          Chart Reviewed number of suggestions sent to Provider: 1   [phone]      Patients Insurance        Commercial Insurance: Sathish Ruiz

## 2023-07-01 ENCOUNTER — LAB (OUTPATIENT)
Dept: LAB | Facility: MEDICAL CENTER | Age: 58
End: 2023-07-01
Payer: COMMERCIAL

## 2023-07-01 DIAGNOSIS — E03.9 HYPOTHYROIDISM, UNSPECIFIED TYPE: ICD-10-CM

## 2023-07-01 DIAGNOSIS — R73.03 PREDIABETES: ICD-10-CM

## 2023-07-01 LAB
EST. AVERAGE GLUCOSE BLD GHB EST-MCNC: 126 MG/DL
GLUCOSE P FAST SERPL-MCNC: 104 MG/DL (ref 65–99)
HBA1C MFR BLD: 6 %
T4 FREE SERPL-MCNC: 0.9 NG/DL (ref 0.61–1.12)
TSH SERPL DL<=0.05 MIU/L-ACNC: 6.57 UIU/ML (ref 0.45–4.5)

## 2023-07-01 PROCEDURE — 84443 ASSAY THYROID STIM HORMONE: CPT

## 2023-07-01 PROCEDURE — 36415 COLL VENOUS BLD VENIPUNCTURE: CPT

## 2023-07-01 PROCEDURE — 84439 ASSAY OF FREE THYROXINE: CPT

## 2023-07-01 PROCEDURE — 83036 HEMOGLOBIN GLYCOSYLATED A1C: CPT

## 2023-07-01 PROCEDURE — 82947 ASSAY GLUCOSE BLOOD QUANT: CPT

## 2023-07-03 ENCOUNTER — OFFICE VISIT (OUTPATIENT)
Dept: INTERNAL MEDICINE CLINIC | Age: 58
End: 2023-07-03
Payer: COMMERCIAL

## 2023-07-03 VITALS
OXYGEN SATURATION: 97 % | HEART RATE: 56 BPM | BODY MASS INDEX: 43.65 KG/M2 | TEMPERATURE: 98.7 F | WEIGHT: 311.8 LBS | DIASTOLIC BLOOD PRESSURE: 88 MMHG | HEIGHT: 71 IN | SYSTOLIC BLOOD PRESSURE: 148 MMHG

## 2023-07-03 DIAGNOSIS — E78.2 MIXED HYPERLIPIDEMIA: Chronic | ICD-10-CM

## 2023-07-03 DIAGNOSIS — R73.03 PREDIABETES: ICD-10-CM

## 2023-07-03 DIAGNOSIS — L03.115 CELLULITIS OF RIGHT LEG: ICD-10-CM

## 2023-07-03 DIAGNOSIS — E03.9 HYPOTHYROIDISM, UNSPECIFIED TYPE: Chronic | ICD-10-CM

## 2023-07-03 DIAGNOSIS — I10 BENIGN ESSENTIAL HTN: Chronic | ICD-10-CM

## 2023-07-03 DIAGNOSIS — Z12.11 ENCOUNTER FOR SCREENING FOR MALIGNANT NEOPLASM OF COLON: Primary | ICD-10-CM

## 2023-07-03 DIAGNOSIS — J45.20 MILD INTERMITTENT ASTHMA WITHOUT COMPLICATION: Chronic | ICD-10-CM

## 2023-07-03 PROBLEM — I87.2 VENOUS ULCER OF LEFT LOWER EXTREMITY WITHOUT VARICOSE VEINS (HCC): Status: RESOLVED | Noted: 2022-12-01 | Resolved: 2023-07-03

## 2023-07-03 PROBLEM — L97.929 VENOUS ULCER OF LEFT LOWER EXTREMITY WITHOUT VARICOSE VEINS (HCC): Status: RESOLVED | Noted: 2022-12-01 | Resolved: 2023-07-03

## 2023-07-03 PROBLEM — S81.801A LEG WOUND, RIGHT: Status: RESOLVED | Noted: 2020-06-16 | Resolved: 2023-07-03

## 2023-07-03 PROCEDURE — 99214 OFFICE O/P EST MOD 30 MIN: CPT | Performed by: INTERNAL MEDICINE

## 2023-07-03 RX ORDER — DOXYCYCLINE HYCLATE 100 MG
100 TABLET ORAL 2 TIMES DAILY
Qty: 28 TABLET | Refills: 0 | Status: SHIPPED | OUTPATIENT
Start: 2023-07-03 | End: 2023-07-17

## 2023-07-03 NOTE — PROGRESS NOTES
Assessment/Plan:     Diagnoses and all orders for this visit:    Encounter for screening for malignant neoplasm of colon    Benign essential HTN  -     Basic metabolic panel; Future    Mixed hyperlipidemia    Hypothyroidism, unspecified type  -     TSH, 3rd generation with Free T4 reflex; Future    Mild intermittent asthma without complication    Prediabetes  -     Basic metabolic panel; Future  -     Hemoglobin A1C; Future    Cellulitis of right leg  -     doxycycline hyclate (VIBRA-TABS) 100 mg tablet; Take 1 tablet (100 mg total) by mouth 2 (two) times a day for 14 days        BMI Counseling: Body mass index is 43.49 kg/m². The BMI is above normal. Nutrition recommendations include decreasing portion sizes, encouraging healthy choices of fruits and vegetables and moderation in carbohydrate intake. Exercise recommendations include moderate physical activity 150 minutes/week. Rationale for BMI follow-up plan is due to patient being overweight or obese. [employer] software was used to dictate this note. It may contain errors with dictating incorrect words or incorrect spelling. Please contact the provider directly with any questions. Subjective:   Chief Complaint   Patient presents with   • Follow-up     HTN. Review labs         Patient ID: Sammy Steinberg is a 66 y.o. male.     HPI  This is a very pleasant 66 years young gentleman who is here today for the regular follow-up  Joint pains chronic especially the knee he takes some Tylenol and that may help    Hypertension is very well controlled better than before continue with the same dose of medication no changes    For thyroidism TSH is slightly elevated I will not change any of the doses of the medication and continue with the same dose and follow-up the TSH in next 4 months    Obesity he is working on diet and exercise but is still his BMI is 43 with the prediabetes I discussed with him to go on GLP-1 agonist treatment if his insurance will cover and then will go from there in the such case I will stop the metformin  give him GLP-1 agonist and go from there that will help him to lose weight that may ultimately help him to decrease his venous insufficiency and control the diabetes or prediabetes much better he will look into from his insurance plan and go from there he will let me know    Chronic venous insufficiency with open areas on the right leg I did the dressing again and I will put him on some antibiotic as there are some signs of infection and some oozing associated with odour and the secretions he may benefit from the antibiotic. Asthma is very well controlled  The following portions of the patient's history were reviewed and updated as appropriate: allergies, current medications, past family history, past medical history, past social history, past surgical history and problem list.    Review of Systems   Constitutional: Negative for appetite change, fatigue and fever. HENT: Negative for congestion, ear pain, hearing loss, nosebleeds, sneezing, tinnitus and voice change. Eyes: Negative for pain, discharge and redness. Respiratory: Negative for cough, chest tightness and wheezing. Cardiovascular: Negative for chest pain, palpitations and leg swelling. Gastrointestinal: Negative for abdominal pain, blood in stool, constipation, diarrhea, nausea and vomiting. Genitourinary: Negative for difficulty urinating, dysuria, hematuria and urgency. Musculoskeletal: Negative for arthralgias (knee pain), back pain, gait problem and joint swelling. Skin: Negative for rash and wound (The wound on the right leg which is there for a long. Of time taken care of by the wound care team now opened it up again with some oozing and the redness). Allergic/Immunologic: Negative for environmental allergies. Neurological: Negative for dizziness, tremors, seizures, weakness, light-headedness and numbness. Hematological: Negative for adenopathy.  Does not bruise/bleed easily. Psychiatric/Behavioral: Negative for behavioral problems and confusion. The patient is not nervous/anxious.           Past Medical History:   Diagnosis Date   • Asthma    • Disease of thyroid gland    • Hematoma of left axilla     Last Assessed:9/23/2015   • Hypertension    • Hyperthyroidism    • Hypothyroidism    • Toxic multinodular goiter     Last Assessed:12/26/2013         Current Outpatient Medications:   •  atorvastatin (LIPITOR) 20 mg tablet, Take 1 tablet (20 mg total) by mouth daily, Disp: 90 tablet, Rfl: 1  •  Fluticasone Propionate, Inhal, (Flovent Diskus) 250 MCG/BLIST AEPB, Inhale 1 puff daily, Disp: 60 blister, Rfl: 2  •  levothyroxine 200 mcg tablet, Take 1 tablet (200 mcg total) by mouth daily, Disp: 90 tablet, Rfl: 1  •  levothyroxine 75 mcg tablet, Take 1 tablet (75 mcg total) by mouth daily, Disp: 90 tablet, Rfl: 1  •  losartan (COZAAR) 50 mg tablet, Take 1 tablet (50 mg total) by mouth daily, Disp: 90 tablet, Rfl: 1  •  metFORMIN (GLUCOPHAGE-XR) 500 mg 24 hr tablet, Take 1 tablet (500 mg total) by mouth daily with dinner, Disp: 90 tablet, Rfl: 1  •  potassium chloride (K-DUR,KLOR-CON) 20 mEq tablet, Take 1 tablet (20 mEq total) by mouth daily, Disp: 30 tablet, Rfl: 0    Allergies   Allergen Reactions   • Cat Hair Extract      Eye irritation       Social History   Past Surgical History:   Procedure Laterality Date   • KNEE ARTHROSCOPY Left    • THYROID SURGERY      Ablation     Family History   Problem Relation Age of Onset   • Diabetes Mother         mellitus   • Parkinsonism Father    • Hypothyroidism Sister    • Cancer Maternal Grandmother    • Diabetes Maternal Aunt    • Diabetes Maternal Uncle    • Heart disease Neg Hx    • Stroke Neg Hx        Objective:  /88 (BP Location: Left arm, Patient Position: Sitting, Cuff Size: Large)   Pulse 56   Temp 98.7 °F (37.1 °C) (Temporal)   Ht 5' 11" (1.803 m)   Wt (!) 141 kg (311 lb 12.8 oz)   SpO2 97%   BMI 43.49 kg/m² Physical Exam  Constitutional:       Appearance: He is well-developed. He is obese. HENT:      Right Ear: External ear normal.   Eyes:      Conjunctiva/sclera: Conjunctivae normal.      Pupils: Pupils are equal, round, and reactive to light. Neck:      Thyroid: No thyromegaly. Vascular: No JVD. Cardiovascular:      Rate and Rhythm: Normal rate and regular rhythm. Pulses: Normal pulses. Heart sounds: Normal heart sounds. Pulmonary:      Breath sounds: Normal breath sounds. Abdominal:      General: Bowel sounds are normal.      Palpations: Abdomen is soft. Musculoskeletal:         General: Normal range of motion. Cervical back: Normal range of motion. Lymphadenopathy:      Cervical: No cervical adenopathy. Skin:     General: Skin is dry. Comments: Open area with chronic venous insufficiency and the wound with multiple small areas of oozing I did the dressing and have put the pressure bandage over it   Neurological:      General: No focal deficit present. Mental Status: He is alert and oriented to person, place, and time. Mental status is at baseline. Deep Tendon Reflexes: Reflexes are normal and symmetric.    Psychiatric:         Mood and Affect: Mood normal.         Behavior: Behavior normal.

## 2023-10-03 ENCOUNTER — TELEPHONE (OUTPATIENT)
Dept: INTERNAL MEDICINE CLINIC | Age: 58
End: 2023-10-03

## 2023-10-03 NOTE — TELEPHONE ENCOUNTER
Patient called the office and stated that he has had an infection for over a year on his leg and hes gone to wound care and a few other places but it hasnt gone away. He would like to know if there is somewhere else he can go. He did move up to [place] so he asked if you know of someone up that way. Please advise.

## 2023-10-05 NOTE — TELEPHONE ENCOUNTER
Please get in [facility] and they may be able to help

## 2023-10-18 ENCOUNTER — OFFICE VISIT (OUTPATIENT)
Dept: WOUND CARE | Facility: CLINIC | Age: 58
End: 2023-10-18
Payer: COMMERCIAL

## 2023-10-18 VITALS
DIASTOLIC BLOOD PRESSURE: 86 MMHG | BODY MASS INDEX: 40.63 KG/M2 | WEIGHT: 300 LBS | HEART RATE: 71 BPM | SYSTOLIC BLOOD PRESSURE: 198 MMHG | RESPIRATION RATE: 20 BRPM | HEIGHT: 72 IN | TEMPERATURE: 97.6 F

## 2023-10-18 DIAGNOSIS — I87.2 VENOUS STASIS ULCER OF OTHER PART OF RIGHT LOWER LEG LIMITED TO BREAKDOWN OF SKIN WITHOUT VARICOSE VEINS (HCC): Primary | ICD-10-CM

## 2023-10-18 DIAGNOSIS — L97.921 NON-PRESSURE CHRONIC ULCER LEFT LOWER LEG, LIMITED TO BREAKDOWN SKIN (HCC): ICD-10-CM

## 2023-10-18 DIAGNOSIS — F10.10 ALCOHOL ABUSE: ICD-10-CM

## 2023-10-18 DIAGNOSIS — R60.0 BILATERAL EDEMA OF LOWER EXTREMITY: ICD-10-CM

## 2023-10-18 DIAGNOSIS — L97.811 VENOUS STASIS ULCER OF OTHER PART OF RIGHT LOWER LEG LIMITED TO BREAKDOWN OF SKIN WITHOUT VARICOSE VEINS (HCC): Primary | ICD-10-CM

## 2023-10-18 PROCEDURE — 99213 OFFICE O/P EST LOW 20 MIN: CPT | Performed by: ORTHOPAEDIC SURGERY

## 2023-10-18 RX ORDER — LIDOCAINE HYDROCHLORIDE 40 MG/ML
5 SOLUTION TOPICAL ONCE
Status: COMPLETED | OUTPATIENT
Start: 2023-10-18 | End: 2023-10-18

## 2023-10-18 RX ADMIN — LIDOCAINE HYDROCHLORIDE 5 ML: 40 SOLUTION TOPICAL at 13:10

## 2023-10-18 NOTE — PROGRESS NOTES
Patient ID: Peri France is a 66 y.o. male Date of Birth 1965       Chief Complaint   Patient presents with    Follow Up Wound Care Visit     RLE wound       Allergies:  Cat hair extract    Diagnosis:   Diagnosis ICD-10-CM Associated Orders   1. Venous stasis ulcer of other part of right lower leg limited to breakdown of skin without varicose veins (HCC)  I87.2 lidocaine (XYLOCAINE) 4 % topical solution 5 mL    L97.811 Wound cleansing and dressings     Wound compression and edema control     VAS reflux lower limb venous duplex study with reflux assessment, complete bilateral     VAS lower limb arterial duplex, complete bilateral     Ambulatory Referral to Vascular Surgery      2. Non-pressure chronic ulcer left lower leg, limited to breakdown skin (HCC)  L97.921 VAS reflux lower limb venous duplex study with reflux assessment, complete bilateral     VAS lower limb arterial duplex, complete bilateral     Ambulatory Referral to Vascular Surgery      3. Bilateral edema of lower extremity  R60.0       4. Alcohol abuse  F10.10            Assessment and Plan :  Initial Evaluation Right Venous stasis ulcer. No signs of infection today. Mechanically debrided with chlorhexidine sponge. Wound management with Unna boot, see wound orders below   Do not wet wound. Counseled on importance of frequent elevation of leg and increase exercise/walking for wound healing. Counseled on alcohol cessation. F/u with vascular studies ordered. A1C results reviewed with the patient today. Last A1c 6.0  Follow-up in 1 week(s) or call sooner with questions or concerns or any    signs of infection such as redness, swelling, increased/purulent drainage, fever, chills, increased severe pain. Subjective:   12/1/22:  1st visit for this 79-year-old male referred to the wound center because of multiple ulcerations of both lower extremities. Patient states that he has had these for 2-3 months and most of then start as blisters.   Some he believe started as irritation from is toe all work boot. However, he also has ulcerations on the calfs above the work boot. He does have swelling of both lower extremities. The patient was treated for cellulitis by his primary care physician. Last time he had any ulcers on his leg was approximately one year ago. He does wear compression stockings but since his skin is broken down, he has not worn them. 12/8/2022: Follow-up venous stasis ulcers of both lower extremities. Edema of both lower extremities. Unna boots have been used and he has had no problem with them. He noticed that the edema is starting to decrease and the ulcers are also improving. No pain, fever or chills. 12/16/2022: Follow-up venous stasis ulcers of the right lower extremity. At last visit the left was healed and he is now wearing compression stocking. No complaints. Tolerating Unna boot. 12/22/2022: Follow-up venous stasis ulcers of the right lateral calf. Doing well. No complaints. No pain. Tolerating Unna boot. 12/30/2022: Follow-up venous stasis ulcer of the right lateral calf. No complaints. 1/23/2023: Patient was recently discharged from the wound center after closure of venous stasis ulcers of the right lateral calf. He has been using his compression stockings daily. He noticed a week ago that he had breakdown on the right calf in the same area. He has been using [employer] and dry dressing. He arrived without any stocking in place. 1/30/2023: Follow-up venous stasis ulcers of the right lateral calf. Doing well with [employer] He has compression stocking 15 to 20 mmHg. Not wearing though because of Unna boot. No new complaints. 2/6/2023: Follow-up venous stasis ulcer of the right lateral calf. No complaints. Doing well. No fever or chills. 2/13/2023: Follow-up venous stasis ulcers of the right lateral calf. At last visit, we changed over to Coflex instead of the [employer]   [place] that the Coflex was not as tight as the [employer]. .  No other complaints. 2/20/2023: Follow-up venous stasis ulcer of the right lateral calf. Did well with Unna boot. No new complaints. 3/6/2023: Follow-up venous stasis ulcer of the right lateral calf. Unna boot. No new complaints. Has not gotten longer stockings yet. 3/13/2023: Follow-up venous stasis ulcer of the right lateral calf. At last visit, it appeared that the ulcer is completely closed but we wrapped him again. He has ordered longer length of compression stockings. He has not yet received them. 10/18: Pt presents for follow-up evaluation of RLE venous stasis ulcers. Pt has not been seen since March due to relocating to [address] Pt received prior treatment at [facility] with Dr. Michale Schilder. He now presents with persistent R VSU in which he has been leaving open to air. Pt admits he drinks a lot of beers on weekends, (approximately 8 a day). Has been wearing knee length compression stockings. Pt was ordered thigh high compression stockings of which he admits he never purchased.        The following portions of the patient's history were reviewed and updated as appropriate:   Patient Active Problem List   Diagnosis    Benign essential HTN    HLD (hyperlipidemia)    Hypothyroid    Asthma    Primary osteoarthritis of both knees    Obesity, Class III, BMI 40-49.9 (morbid obesity) ([address])    Prediabetes     Past Medical History:   Diagnosis Date    Asthma     Disease of thyroid gland     Hematoma of left axilla     Last Assessed:9/23/2015    Hypertension     Hyperthyroidism     Hypothyroidism     Toxic multinodular goiter     Last Assessed:12/26/2013     Past Surgical History:   Procedure Laterality Date    KNEE ARTHROSCOPY Left     THYROID SURGERY      Ablation     Family History   Problem Relation Age of Onset    Diabetes Mother         mellitus    Parkinsonism Father     Hypothyroidism Sister     Cancer Maternal Grandmother     Diabetes Maternal Aunt     Diabetes Maternal Uncle     Heart disease Neg Hx     Stroke Neg Hx      Social History     Socioeconomic History    Marital status: /Civil Union     Spouse name: Not on file    Number of children: Not on file    Years of education: Not on file    Highest education level: Not on file   Occupational History    Occupation:    Tobacco Use    Smoking status: Former     Packs/day: 0.75     Years: 15.00     Total pack years: 11.25     Types: Cigarettes     Start date: 1992     Quit date: 2007     Years since quittin.8    Smokeless tobacco: Never   Vaping Use    Vaping Use: Never used   Substance and Sexual Activity    Alcohol use:  Yes     Alcohol/week: 2.0 standard drinks of alcohol     Types: 2 Cans of beer per week     Comment: Social drinker     Drug use: No    Sexual activity: Yes     Partners: Female     Birth control/protection: Female Sterilization   Other Topics Concern    Not on file   Social History Narrative    Daily coffee consumption/6 cups a day    Daily cola consumption/1 can a day    Recreational activities hiking    Travel History/Pt denies being out of the country during 2014-2014     Social Determinants of Health     Financial Resource Strain: Not on file   Food Insecurity: Not on file   Transportation Needs: Not on file   Physical Activity: Not on file   Stress: Not on file   Social Connections: Not on file   Intimate Partner Violence: Not on file   Housing Stability: Not on file       Current Outpatient Medications:     atorvastatin (LIPITOR) 20 mg tablet, Take 1 tablet (20 mg total) by mouth daily, Disp: 90 tablet, Rfl: 1    Fluticasone Propionate, Inhal, (Flovent Diskus) 250 MCG/BLIST AEPB, Inhale 1 puff daily, Disp: 60 blister, Rfl: 2    levothyroxine 200 mcg tablet, Take 1 tablet (200 mcg total) by mouth daily, Disp: 90 tablet, Rfl: 1    levothyroxine 75 mcg tablet, Take 1 tablet (75 mcg total) by mouth daily, Disp: 90 tablet, Rfl: 1    losartan (COZAAR) 50 mg tablet, Take 1 tablet (50 mg total) by mouth daily, Disp: 90 tablet, Rfl: 1    metFORMIN (GLUCOPHAGE-XR) 500 mg 24 hr tablet, Take 1 tablet (500 mg total) by mouth daily with dinner, Disp: 90 tablet, Rfl: 1    potassium chloride (K-DUR,KLOR-CON) 20 mEq tablet, Take 1 tablet (20 mEq total) by mouth daily, Disp: 30 tablet, Rfl: 0  No current facility-administered medications for this visit. Review of Systems   Constitutional:  Negative for chills, fatigue and fever. HENT:  Negative for congestion, hearing loss and postnasal drip. Respiratory:  Negative for cough and shortness of breath. Cardiovascular:  Positive for leg swelling (Bilateral). Musculoskeletal:  Negative for gait problem. Skin:  Positive for wound (RLE). Negative for rash. Neurological:  Negative for numbness. Hematological:  Does not bruise/bleed easily. Psychiatric/Behavioral: Negative. Objective:  BP (!) 198/86   Pulse 71   Temp 97.6 °F (36.4 °C)   Resp 20   Ht 6' (1.829 m)   Wt 136 kg (300 lb)   BMI 40.69 kg/m²   Pain Score:   3     Physical Exam  Vitals and nursing note reviewed. Constitutional:       Appearance: Normal appearance. He is well-developed. He is morbidly obese. HENT:      Head: Normocephalic and atraumatic. Cardiovascular:      Rate and Rhythm: Normal rate. Pulmonary:      Effort: Pulmonary effort is normal.   Skin:     General: Skin is warm and dry. Findings: Wound (RLE) present. No erythema. Comments: Edematous, with mixed pink and excoritaed beefy red wound bed and areas of epithelialized tissue and dry scaly skin throughout. No signs of infection. See wound assessment. Neurological:      Mental Status: He is alert and oriented to person, place, and time. Psychiatric:         Attention and Perception: Attention normal.         Mood and Affect: Mood and affect normal.         Behavior: Behavior is cooperative.          Cognition and Memory: Cognition normal. Wound 10/18/23 Venous Ulcer Pretibial Distal;Right (Active)   Wound Description Pink; White;Beefy red 10/18/23 1301   Sada-wound Assessment Excoriated;Fragile;Edema; Erythema 10/18/23 1301   Wound Length (cm) 27 cm 10/18/23 1301   Wound Width (cm) 50.5 cm 10/18/23 1301   Wound Depth (cm) 0.1 cm 10/18/23 1301   Wound Surface Area (cm^2) 1363.5 cm^2 10/18/23 1301   Wound Volume (cm^3) 136.35 cm^3 10/18/23 1301   Calculated Wound Volume (cm^3) 136.35 cm^3 10/18/23 1301   Drainage Amount Moderate 10/18/23 1301   Drainage Description Serous 10/18/23 1301   Non-staged Wound Description Full thickness 10/18/23 1301   Dressing Status Intact 10/18/23 1301                Wound Instructions:  Orders Placed This Encounter   Procedures    Wound cleansing and dressings     Wash your hands with soap and water. Remove old dressing, discard into plastic bag and place in trash. Cleanse the wound with chlorhexidine sponge prior to applying a clean dressing. Do not use tissue or cotton balls. Do not scrub the wound. Pat dry using gauze. Shower yes but do not get wrap wet    Apply unna boot to RLE. See pt back on Friday for a nurse visit. See Vincent Burgos in 1 week     Standing Status:   Future     Standing Expiration Date:   10/18/2024    Wound compression and edema control     Unna Boot/ Multi-layer compression wrap Instructions    Keep compression wrap/wraps clean and dry. If wraps are too tight and you experience numbness/tingling, call the wound center. If after hours, remove wraps or proceed to nearest E.R. and call wound center in AM.    Wrap will be changed 1-2x weekly    Avoid prolonged standing in one place. Elevate leg(s) above the level of the heart when sitting or as much as possible.      Standing Status:   Future     Standing Expiration Date:   10/18/2024    Ambulatory Referral to Vascular Surgery     Standing Status:   Future     Standing Expiration Date:   10/18/2024     Referral Priority: Routine     Referral Type:   Consult - AMB     Referral Reason:   Specialty Services Required     Requested Specialty:   Vascular Surgery     Number of Visits Requested:   1     Expiration Date:   10/18/2024       Amaya Otto PA-C, [facility]      Portions of the record may have been created with voice recognition software. Occasional wrong word or "sound alike" substitutions may have occurred due to the inherent limitations of voice recognition software. Read the chart carefully and recognize, using context, where substitutions have occurred.

## 2023-10-18 NOTE — PATIENT INSTRUCTIONS
Orders Placed This Encounter   Procedures    Wound cleansing and dressings     Wash your hands with soap and water. Remove old dressing, discard into plastic bag and place in trash. Cleanse the wound with chlorhexidine sponge prior to applying a clean dressing. Do not use tissue or cotton balls. Do not scrub the wound. Pat dry using gauze. Shower yes but do not get wrap wet    Apply unna boot to RLE. See pt back on Friday for a nurse visit. See Katie Holland in 1 week     Standing Status:   Future     Standing Expiration Date:   10/18/2024    Wound compression and edema control     Unna Boot/ Multi-layer compression wrap Instructions    Keep compression wrap/wraps clean and dry. If wraps are too tight and you experience numbness/tingling, call the wound center. If after hours, remove wraps or proceed to nearest E.R. and call wound center in AM.    Wrap will be changed 1-2x weekly    Avoid prolonged standing in one place. Elevate leg(s) above the level of the heart when sitting or as much as possible.      Standing Status:   Future     Standing Expiration Date:   10/18/2024

## 2023-10-20 ENCOUNTER — CLINICAL SUPPORT (OUTPATIENT)
Dept: WOUND CARE | Facility: CLINIC | Age: 58
End: 2023-10-20
Payer: COMMERCIAL

## 2023-10-20 VITALS
DIASTOLIC BLOOD PRESSURE: 74 MMHG | TEMPERATURE: 97.7 F | HEART RATE: 80 BPM | SYSTOLIC BLOOD PRESSURE: 160 MMHG | RESPIRATION RATE: 18 BRPM

## 2023-10-20 DIAGNOSIS — L97.811 VENOUS STASIS ULCER OF OTHER PART OF RIGHT LOWER LEG LIMITED TO BREAKDOWN OF SKIN WITHOUT VARICOSE VEINS (HCC): Primary | ICD-10-CM

## 2023-10-20 DIAGNOSIS — I87.2 VENOUS STASIS ULCER OF OTHER PART OF RIGHT LOWER LEG LIMITED TO BREAKDOWN OF SKIN WITHOUT VARICOSE VEINS (HCC): Primary | ICD-10-CM

## 2023-10-20 PROCEDURE — 29580 STRAPPING UNNA BOOT: CPT

## 2023-10-20 NOTE — PROGRESS NOTES
Cast Application    Date/Time: 10/20/2023 3:30 PM    Performed by: Eliud Gandhi RN  Authorized by: Luz Bowman PA-C  Universal Protocol:  Consent: Verbal consent obtained. Risks and benefits: risks, benefits and alternatives were discussed  Consent given by: patient  Patient understanding: patient states understanding of the procedure being performed  Patient consent: the patient's understanding of the procedure matches consent given  Procedure consent: procedure consent matches procedure scheduled  Patient identity confirmed: verbally with patient    Pre-procedure details:     Sensation:  Normal  Procedure details:     Laterality:  Right    Location:  Leg    Leg:  R lower legCast type:  Unna boot        Supplies:  2 layer wrap (Unna boot, cleanse with chlorhexidine)  Post-procedure details:     Pain:  Improved    Sensation:  Normal    Patient tolerance of procedure: Tolerated well, no immediate complications  Comments:      UNNA BOOT wrap procedure     Before application, ARACELI and/or TBI determined to be adequate for healing and application of compression. Lower extremity washed prior to application of compression wrap. With the foot in dorsiflexed position, unna boot was applied as per physician orders without complications or complaint of pain. The procedure was tolerated well. Toes warm & pink post application. Patient provided education & reinforced to observe toes for any discoloration, swelling or tingling and instructed when to report to the  [address] or to remove compression. Wound care as per providers orders, patient tolerated well.

## 2023-10-27 ENCOUNTER — OFFICE VISIT (OUTPATIENT)
Dept: WOUND CARE | Facility: CLINIC | Age: 58
End: 2023-10-27
Payer: COMMERCIAL

## 2023-10-27 VITALS
TEMPERATURE: 97.4 F | DIASTOLIC BLOOD PRESSURE: 69 MMHG | SYSTOLIC BLOOD PRESSURE: 148 MMHG | HEART RATE: 67 BPM | RESPIRATION RATE: 18 BRPM

## 2023-10-27 DIAGNOSIS — L85.3 XEROSIS OF SKIN: ICD-10-CM

## 2023-10-27 DIAGNOSIS — I87.2 VENOUS STASIS ULCER OF OTHER PART OF RIGHT LOWER LEG LIMITED TO BREAKDOWN OF SKIN WITHOUT VARICOSE VEINS (HCC): Primary | ICD-10-CM

## 2023-10-27 DIAGNOSIS — L97.811 VENOUS STASIS ULCER OF OTHER PART OF RIGHT LOWER LEG LIMITED TO BREAKDOWN OF SKIN WITHOUT VARICOSE VEINS (HCC): Primary | ICD-10-CM

## 2023-10-27 PROCEDURE — 29580 STRAPPING UNNA BOOT: CPT

## 2023-10-27 PROCEDURE — 99213 OFFICE O/P EST LOW 20 MIN: CPT | Performed by: ORTHOPAEDIC SURGERY

## 2023-10-27 NOTE — PATIENT INSTRUCTIONS
Orders Placed This Encounter   Procedures    Wound cleansing and dressings     Wound cleansing and dressings       Wash your hands with soap and water. Remove old dressing, discard into plastic bag and place in trash. Cleanse the wound with chlorhexidine sponge prior to applying a clean dressing. Do not use tissue or cotton balls. Do not scrub the wound. Pat dry using gauze. Shower yes but do not get wrap wet    Apply Ammonium Lactate   Apply unna boot to RLE. Come back Tues for a nurse visit. See Antonette Lehman in 1 week     Standing Status:   Future     Standing Expiration Date:   10/27/2024    Wound compression and edema control     · Wound compression and edema control      Unna Boot/ Multi-layer compression wrap Instructions     Keep compression wrap/wraps clean and dry. If wraps are too tight and you experience numbness/tingling, call the wound center. If after hours, remove wraps or proceed to nearest E.R. and call wound center in AM.     Wrap will be changed 1-2x weekly     Avoid prolonged standing in one place. Elevate leg(s) above the level of the heart when sitting or as much as possible.      Standing Status:   Future     Standing Expiration Date:   77/95/1802    Cast Application     This order was created via procedure documentation

## 2023-10-27 NOTE — PROGRESS NOTES
Cast Application    Date/Time: 10/27/2023 2:00 PM    Performed by: Jane Rucker RN  Authorized by: Romayne Beard, PA-C Universal Protocol:  Consent: Verbal consent obtained. Consent given by: patient  Patient identity confirmed: verbally with patient    Pre-procedure details:     Sensation:  Normal  Procedure details:     Laterality:  Right    Location:  Leg    Leg:  R lower legCast type:  Unna boot        Supplies:  2 layer wrap  Post-procedure details:     Pain:  Improved    Sensation:  Normal    Patient tolerance of procedure: Tolerated well, no immediate complications  Comments:      UNNA BOOT wrap procedure     Before application, ARACELI and/or TBI determined to be adequate for healing and application of compression. Lower extremity washed prior to application of compression wrap. With the foot in dorsiflexed position, unna boot was applied as per physician orders without complications or complaint of pain. The procedure was tolerated well. Toes warm & pink post application. Patient provided education & reinforced to observe toes for any discoloration, swelling or tingling and instructed when to report to the  [address] or to remove compression. Wound care as per providers orders, patient tolerated well.

## 2023-10-27 NOTE — PROGRESS NOTES
Patient ID: Rina Desai is a 66 y.o. male Date of Birth 1965       Chief Complaint   Patient presents with    Follow Up Wound Care Visit     Right lower extremity ulcer       Allergies:  Cat hair extract    Diagnosis:   Diagnosis ICD-10-CM Associated Orders   1. Venous stasis ulcer of other part of right lower leg limited to breakdown of skin without varicose veins ([facility])  I87.2 Wound cleansing and dressings    L97.811 Wound compression and edema control     Cast Application      2. Xerosis of skin  L85.3            Assessment and Plan :  Follow up evaluation of Right Venous stasis ulcer with dry scaly periwound. Mechanically debrided with chlorhexidine sponge. Wound management with LacHydrin and Unna boot (2 x week), see wound orders below   Do not wet wound. Counseled on importance of frequent elevation of leg and increase exercise/walking for wound healing. Counseled on alcohol cessation. F/u with vascular studies as ordered. Follow-up in 1 week(s) or call sooner with questions or concerns or any    signs of infection such as redness, swelling, increased/purulent drainage, fever, chills, increased severe pain. Subjective:   12/1/22:  1st visit for this 61-year-old male referred to the wound center because of multiple ulcerations of both lower extremities. Patient states that he has had these for 2-3 months and most of then start as blisters. Some he believe started as irritation from is toe all work boot. However, he also has ulcerations on the calfs above the work boot. He does have swelling of both lower extremities. The patient was treated for cellulitis by his primary care physician. Last time he had any ulcers on his leg was approximately one year ago. He does wear compression stockings but since his skin is broken down, he has not worn them. 12/8/2022: Follow-up venous stasis ulcers of both lower extremities. Edema of both lower extremities.   Unna boots have been used and he has had no problem with them. He noticed that the edema is starting to decrease and the ulcers are also improving. No pain, fever or chills. 12/16/2022: Follow-up venous stasis ulcers of the right lower extremity. At last visit the left was healed and he is now wearing compression stocking. No complaints. Tolerating Unna boot. 12/22/2022: Follow-up venous stasis ulcers of the right lateral calf. Doing well. No complaints. No pain. Tolerating Unna boot. 12/30/2022: Follow-up venous stasis ulcer of the right lateral calf. No complaints. 1/23/2023: Patient was recently discharged from the wound center after closure of venous stasis ulcers of the right lateral calf. He has been using his compression stockings daily. He noticed a week ago that he had breakdown on the right calf in the same area. He has been using [employer] and dry dressing. He arrived without any stocking in place. 1/30/2023: Follow-up venous stasis ulcers of the right lateral calf. Doing well with [employer] He has compression stocking 15 to 20 mmHg. Not wearing though because of Unna boot. No new complaints. 2/6/2023: Follow-up venous stasis ulcer of the right lateral calf. No complaints. Doing well. No fever or chills. 2/13/2023: Follow-up venous stasis ulcers of the right lateral calf. At last visit, we changed over to Coflex instead of the [employer] [place] that the Coflex was not as tight as the Unna. .  No other complaints. 2/20/2023: Follow-up venous stasis ulcer of the right lateral calf. Did well with Unna boot. No new complaints. 3/6/2023: Follow-up venous stasis ulcer of the right lateral calf. Unna boot. No new complaints. Has not gotten longer stockings yet. 3/13/2023: Follow-up venous stasis ulcer of the right lateral calf. At last visit, it appeared that the ulcer is completely closed but we wrapped him again. He has ordered longer length of compression stockings.   He has not yet received them.    10/18: Pt presents for follow-up evaluation of RLE venous stasis ulcers. Pt has not been seen since March due to relocating to [facility]. Pt received prior treatment at [facility] with Dr. Coni Black. He now presents with persistent R VSU in which he has been leaving open to air. Pt admits he drinks a lot of beers on weekends, (approximately 8 a day). Has been wearing knee length compression stockings. Pt was ordered thigh high compression stockings of which he admits he never purchased. 10/27: Pt presents for follow-up evaluation of RLE venous stasis ulcers. Pt c/o itchy skin. Pt has been wearing Unna boot for compression as indicated.            The following portions of the patient's history were reviewed and updated as appropriate:   Patient Active Problem List   Diagnosis    Benign essential HTN    HLD (hyperlipidemia)    Hypothyroid    Asthma    Primary osteoarthritis of both knees    Obesity, Class III, BMI 40-49.9 (morbid obesity) ([address])    Prediabetes     Past Medical History:   Diagnosis Date    Asthma     Disease of thyroid gland     Hematoma of left axilla     Last Assessed:9/23/2015    Hypertension     Hyperthyroidism     Hypothyroidism     Toxic multinodular goiter     Last Assessed:12/26/2013     Past Surgical History:   Procedure Laterality Date    KNEE ARTHROSCOPY Left     THYROID SURGERY      Ablation     Family History   Problem Relation Age of Onset    Diabetes Mother         mellitus    Parkinsonism Father     Hypothyroidism Sister     Cancer Maternal Grandmother     Diabetes Maternal Aunt     Diabetes Maternal Uncle     Heart disease Neg Hx     Stroke Neg Hx      Social History     Socioeconomic History    Marital status: /Civil Union     Spouse name: Not on file    Number of children: Not on file    Years of education: Not on file    Highest education level: Not on file   Occupational History    Occupation:    Tobacco Use    Smoking status: Former     Packs/day: 0.75     Years: 15.00     Total pack years: 11.25     Types: Cigarettes     Start date: 1992     Quit date: 2007     Years since quittin.8    Smokeless tobacco: Never   Vaping Use    Vaping Use: Never used   Substance and Sexual Activity    Alcohol use:  Yes     Alcohol/week: 2.0 standard drinks of alcohol     Types: 2 Cans of beer per week     Comment: Social drinker     Drug use: No    Sexual activity: Yes     Partners: Female     Birth control/protection: Female Sterilization   Other Topics Concern    Not on file   Social History Narrative    Daily coffee consumption/6 cups a day    Daily cola consumption/1 can a day    Recreational activities hiking    Travel History/Pt denies being out of the country during 2014-2014     Social Determinants of Health     Financial Resource Strain: Not on file   Food Insecurity: Not on file   Transportation Needs: Not on file   Physical Activity: Not on file   Stress: Not on file   Social Connections: Not on file   Intimate Partner Violence: Not on file   Housing Stability: Not on file       Current Outpatient Medications:     atorvastatin (LIPITOR) 20 mg tablet, Take 1 tablet (20 mg total) by mouth daily, Disp: 90 tablet, Rfl: 1    Fluticasone Propionate, Inhal, (Flovent Diskus) 250 MCG/BLIST AEPB, Inhale 1 puff daily, Disp: 60 blister, Rfl: 2    levothyroxine 200 mcg tablet, Take 1 tablet (200 mcg total) by mouth daily, Disp: 90 tablet, Rfl: 1    levothyroxine 75 mcg tablet, Take 1 tablet (75 mcg total) by mouth daily, Disp: 90 tablet, Rfl: 1    losartan (COZAAR) 50 mg tablet, Take 1 tablet (50 mg total) by mouth daily, Disp: 90 tablet, Rfl: 1    metFORMIN (GLUCOPHAGE-XR) 500 mg 24 hr tablet, Take 1 tablet (500 mg total) by mouth daily with dinner, Disp: 90 tablet, Rfl: 1    potassium chloride (K-DUR,KLOR-CON) 20 mEq tablet, Take 1 tablet (20 mEq total) by mouth daily, Disp: 30 tablet, Rfl: 0    Review of Systems   Constitutional:  Negative for chills, fatigue and fever. HENT:  Negative for congestion, hearing loss and postnasal drip. Respiratory:  Negative for cough and shortness of breath. Cardiovascular:  Positive for leg swelling (Bilateral). Musculoskeletal:  Negative for gait problem. Skin:  Positive for wound (RLE). Negative for rash. Neurological:  Negative for numbness. Hematological:  Does not bruise/bleed easily. Psychiatric/Behavioral: Negative. Objective:  /69   Pulse 67   Temp (!) 97.4 °F (36.3 °C)   Resp 18   Pain Score: 0-No pain     Physical Exam  Vitals and nursing note reviewed. Constitutional:       Appearance: Normal appearance. He is well-developed. He is morbidly obese. HENT:      Head: Normocephalic and atraumatic. Cardiovascular:      Rate and Rhythm: Normal rate. Pulmonary:      Effort: Pulmonary effort is normal.   Skin:     General: Skin is warm and dry. Findings: Wound (RLE) present. No erythema. Comments: Edematous, with mixed pink excoriated and dry scaly skin throughout. Lower extremity. See wound assessment. Neurological:      Mental Status: He is alert and oriented to person, place, and time. Psychiatric:         Attention and Perception: Attention normal.         Mood and Affect: Mood and affect normal.         Behavior: Behavior is cooperative.          Cognition and Memory: Cognition normal.                 Wound 10/18/23 Venous Ulcer Pretibial Distal;Right (Active)   Wound Image Images linked 10/27/23 1359   Wound Description Pink;Yellow;Dry;Epithelialization 10/27/23 1357   Sada-wound Assessment Scaly;Pink;Dry 10/27/23 1357   Wound Length (cm) 12.4 cm 10/27/23 1357   Wound Width (cm) 14.5 cm 10/27/23 1357   Wound Depth (cm) 0.1 cm 10/27/23 1357   Wound Surface Area (cm^2) 179.8 cm^2 10/27/23 1357   Wound Volume (cm^3) 17.98 cm^3 10/27/23 1357   Calculated Wound Volume (cm^3) 17.98 cm^3 10/27/23 1357   Change in Wound Size % 86.81 10/27/23 1357   Drainage Amount Scant 10/27/23 1357   Drainage Description Serosanguineous 10/27/23 1357   Non-staged Wound Description Full thickness 10/27/23 1357   Dressing Status Intact 10/27/23 1357         Wound Instructions:  Orders Placed This Encounter   Procedures    Wound cleansing and dressings     Wound cleansing and dressings       Wash your hands with soap and water. Remove old dressing, discard into plastic bag and place in trash. Cleanse the wound with chlorhexidine sponge prior to applying a clean dressing. Do not use tissue or cotton balls. Do not scrub the wound. Pat dry using gauze. Shower yes but do not get wrap wet    Apply Ammonium Lactate   Apply unna boot to RLE. Come back Tues for a nurse visit. See Merline Greer in 1 week     Standing Status:   Future     Standing Expiration Date:   10/27/2024    Wound compression and edema control     · Wound compression and edema control      Unna Boot/ Multi-layer compression wrap Instructions     Keep compression wrap/wraps clean and dry. If wraps are too tight and you experience numbness/tingling, call the wound center. If after hours, remove wraps or proceed to nearest E.R. and call wound center in AM.     Wrap will be changed 1-2x weekly     Avoid prolonged standing in one place. Elevate leg(s) above the level of the heart when sitting or as much as possible. Standing Status:   Future     Standing Expiration Date:   21/60/0765    Cast Application     This order was created via procedure documentation       Merline Greer PA-C, [facility]      Portions of the record may have been created with voice recognition software. Occasional wrong word or "sound alike" substitutions may have occurred due to the inherent limitations of voice recognition software. Read the chart carefully and recognize, using context, where substitutions have occurred.

## 2023-10-31 ENCOUNTER — CLINICAL SUPPORT (OUTPATIENT)
Dept: WOUND CARE | Facility: CLINIC | Age: 58
End: 2023-10-31
Payer: COMMERCIAL

## 2023-10-31 VITALS
RESPIRATION RATE: 18 BRPM | SYSTOLIC BLOOD PRESSURE: 150 MMHG | HEART RATE: 72 BPM | DIASTOLIC BLOOD PRESSURE: 72 MMHG | TEMPERATURE: 97.4 F

## 2023-10-31 DIAGNOSIS — I87.2 VENOUS STASIS ULCER OF OTHER PART OF RIGHT LOWER LEG LIMITED TO BREAKDOWN OF SKIN WITHOUT VARICOSE VEINS (HCC): Primary | ICD-10-CM

## 2023-10-31 DIAGNOSIS — L97.811 VENOUS STASIS ULCER OF OTHER PART OF RIGHT LOWER LEG LIMITED TO BREAKDOWN OF SKIN WITHOUT VARICOSE VEINS (HCC): Primary | ICD-10-CM

## 2023-10-31 PROCEDURE — 29580 STRAPPING UNNA BOOT: CPT

## 2023-10-31 PROCEDURE — NC001 PR NO CHARGE: Performed by: ORTHOPAEDIC SURGERY

## 2023-10-31 NOTE — PROGRESS NOTES
Cast Application    Date/Time: 10/31/2023 2:30 PM    Performed by: Jamarcus Varma RN  Authorized by: Romayne Beard, PA-C Universal Protocol:  Consent: Verbal consent obtained. Consent given by: patient  Patient identity confirmed: verbally with patient    Pre-procedure details:     Sensation:  Normal  Procedure details:     Laterality:  Right    Location:  Leg    Leg:  R lower legCast type:  Unna boot        Supplies:  2 layer wrap  Post-procedure details:     Pain:  Improved    Sensation:  Normal    Patient tolerance of procedure: Tolerated well, no immediate complications  Comments:      UNNA BOOT wrap procedure     Before application, ARACELI and/or TBI determined to be adequate for healing and application of compression. Lower extremity washed prior to application of compression wrap. With the foot in dorsiflexed position, unna boot was applied as per physician orders without complications or complaint of pain. The procedure was tolerated well. Toes warm & pink post application. Patient provided education & reinforced to observe toes for any discoloration, swelling or tingling and instructed when to report to the  [address] or to remove compression. Wound care as per providers orders, patient tolerated well. Wound cleansing and dressings                                  Wash your hands with soap and water. Remove old dressing, discard into plastic bag and place in trash. Cleanse the wound with chlorhexidine sponge prior to applying a clean dressing. Do not use tissue or cotton balls. Do not scrub the wound. Pat dry using gauze. Shower yes but do not get wrap wet     Apply Ammonium Lactate   Apply unna boot to RLE.

## 2023-11-03 ENCOUNTER — OFFICE VISIT (OUTPATIENT)
Dept: WOUND CARE | Facility: CLINIC | Age: 58
End: 2023-11-03
Payer: COMMERCIAL

## 2023-11-03 VITALS
RESPIRATION RATE: 18 BRPM | SYSTOLIC BLOOD PRESSURE: 146 MMHG | TEMPERATURE: 98.2 F | HEART RATE: 78 BPM | DIASTOLIC BLOOD PRESSURE: 66 MMHG

## 2023-11-03 DIAGNOSIS — L85.3 XEROSIS OF SKIN: ICD-10-CM

## 2023-11-03 DIAGNOSIS — I87.2 VENOUS STASIS ULCER OF OTHER PART OF RIGHT LOWER LEG LIMITED TO BREAKDOWN OF SKIN WITHOUT VARICOSE VEINS (HCC): Primary | ICD-10-CM

## 2023-11-03 DIAGNOSIS — L97.811 VENOUS STASIS ULCER OF OTHER PART OF RIGHT LOWER LEG LIMITED TO BREAKDOWN OF SKIN WITHOUT VARICOSE VEINS (HCC): Primary | ICD-10-CM

## 2023-11-03 PROCEDURE — 99212 OFFICE O/P EST SF 10 MIN: CPT | Performed by: ORTHOPAEDIC SURGERY

## 2023-11-03 PROCEDURE — 99213 OFFICE O/P EST LOW 20 MIN: CPT | Performed by: ORTHOPAEDIC SURGERY

## 2023-11-03 RX ORDER — AMMONIUM LACTATE 12 G/100G
LOTION TOPICAL 2 TIMES DAILY PRN
Qty: 400 G | Refills: 2 | Status: SHIPPED | OUTPATIENT
Start: 2023-11-03

## 2023-11-03 NOTE — PROGRESS NOTES
Patient ID: Alejandra Stubbs is a 66 y.o. male Date of Birth 1965       Chief Complaint   Patient presents with    Follow Up Wound Care Visit     RLE wound       Allergies:  Cat hair extract    Diagnosis:   Diagnosis ICD-10-CM Associated Orders   1. Venous stasis ulcer of other part of right lower leg limited to breakdown of skin without varicose veins ([facility])  I87.2 Wound cleansing and dressings    L97.811       2. Xerosis of skin  L85.3 ammonium lactate (LAC-HYDRIN) 12 % lotion           Assessment and Plan :  Follow up evaluation of Right Venous stasis ulcer is completely epithelialized. Continue to Moisturize skin daily with LacHydrin as prescribed. Discussed the importance of long-term edema control and use of long-term compression. Recommended 30-40mmHg compression stockings. Counseled on importance of frequent elevation of leg and increase exercise/walking. Counseled on the importance of weight loss. F/u with vascular studies and vascular surgeon as ordered. Follow up as needed or call with questions or concerns    Subjective:   12/1/22:  1st visit for this 42-year-old male referred to the wound center because of multiple ulcerations of both lower extremities. Patient states that he has had these for 2-3 months and most of then start as blisters. Some he believe started as irritation from is toe all work boot. However, he also has ulcerations on the calfs above the work boot. He does have swelling of both lower extremities. The patient was treated for cellulitis by his primary care physician. Last time he had any ulcers on his leg was approximately one year ago. He does wear compression stockings but since his skin is broken down, he has not worn them. 12/8/2022: Follow-up venous stasis ulcers of both lower extremities. Edema of both lower extremities. Unna boots have been used and he has had no problem with them.   He noticed that the edema is starting to decrease and the ulcers are also improving. No pain, fever or chills. 12/16/2022: Follow-up venous stasis ulcers of the right lower extremity. At last visit the left was healed and he is now wearing compression stocking. No complaints. Tolerating Unna boot. 12/22/2022: Follow-up venous stasis ulcers of the right lateral calf. Doing well. No complaints. No pain. Tolerating Unna boot. 12/30/2022: Follow-up venous stasis ulcer of the right lateral calf. No complaints. 1/23/2023: Patient was recently discharged from the wound center after closure of venous stasis ulcers of the right lateral calf. He has been using his compression stockings daily. He noticed a week ago that he had breakdown on the right calf in the same area. He has been using [employer] and dry dressing. He arrived without any stocking in place. 1/30/2023: Follow-up venous stasis ulcers of the right lateral calf. Doing well with [employer] He has compression stocking 15 to 20 mmHg. Not wearing though because of Unna boot. No new complaints. 2/6/2023: Follow-up venous stasis ulcer of the right lateral calf. No complaints. Doing well. No fever or chills. 2/13/2023: Follow-up venous stasis ulcers of the right lateral calf. At last visit, we changed over to Coflex instead of the [employer] [place] that the Coflex was not as tight as the Unna. .  No other complaints. 2/20/2023: Follow-up venous stasis ulcer of the right lateral calf. Did well with Unna boot. No new complaints. 3/6/2023: Follow-up venous stasis ulcer of the right lateral calf. Unna boot. No new complaints. Has not gotten longer stockings yet. 3/13/2023: Follow-up venous stasis ulcer of the right lateral calf. At last visit, it appeared that the ulcer is completely closed but we wrapped him again. He has ordered longer length of compression stockings. He has not yet received them. 10/18: Pt presents for follow-up evaluation of RLE venous stasis ulcers.  Pt has not been seen since March due to relocating to [facility]. Pt received prior treatment at [facility] with Dr. Krista Levy. He now presents with persistent R VSU in which he has been leaving open to air. Pt admits he drinks a lot of beers on weekends, (approximately 8 a day). Has been wearing knee length compression stockings. Pt was ordered thigh high compression stockings of which he admits he never purchased. 10/27: Pt presents for follow-up evaluation of RLE venous stasis ulcers. Pt c/o itchy skin. Pt has been wearing Unna boot for compression as indicated. 11/3: Pt presents for follow-up evaluation of RLE venous stasis ulcers. No issues or complaints. Pt has been wearing Unna boot for compression as indicated. Denies fevers or chills.          The following portions of the patient's history were reviewed and updated as appropriate:   Patient Active Problem List   Diagnosis    Benign essential HTN    HLD (hyperlipidemia)    Hypothyroid    Asthma    Primary osteoarthritis of both knees    Obesity, Class III, BMI 40-49.9 (morbid obesity) ([address])    Prediabetes     Past Medical History:   Diagnosis Date    Asthma     Disease of thyroid gland     Hematoma of left axilla     Last Assessed:9/23/2015    Hypertension     Hyperthyroidism     Hypothyroidism     Toxic multinodular goiter     Last Assessed:12/26/2013     Past Surgical History:   Procedure Laterality Date    KNEE ARTHROSCOPY Left     THYROID SURGERY      Ablation     Family History   Problem Relation Age of Onset    Diabetes Mother         mellitus    Parkinsonism Father     Hypothyroidism Sister     Cancer Maternal Grandmother     Diabetes Maternal Aunt     Diabetes Maternal Uncle     Heart disease Neg Hx     Stroke Neg Hx      Social History     Socioeconomic History    Marital status: /Civil Union     Spouse name: None    Number of children: None    Years of education: None    Highest education level: None   Occupational History    Occupation:  Tobacco Use    Smoking status: Former     Packs/day: 0.75     Years: 15.00     Total pack years: 11.25     Types: Cigarettes     Start date: 1992     Quit date: 2007     Years since quittin.8    Smokeless tobacco: Never   Vaping Use    Vaping Use: Never used   Substance and Sexual Activity    Alcohol use:  Yes     Alcohol/week: 2.0 standard drinks of alcohol     Types: 2 Cans of beer per week     Comment: Social drinker     Drug use: No    Sexual activity: Yes     Partners: Female     Birth control/protection: Female Sterilization   Other Topics Concern    None   Social History Narrative    Daily coffee consumption/6 cups a day    Daily cola consumption/1 can a day    Recreational activities hiking    Travel History/Pt denies being out of the country during 2014-2014     Social Determinants of Health     Financial Resource Strain: Not on file   Food Insecurity: Not on file   Transportation Needs: Not on file   Physical Activity: Not on file   Stress: Not on file   Social Connections: Not on file   Intimate Partner Violence: Not on file   Housing Stability: Not on file       Current Outpatient Medications:     ammonium lactate (LAC-HYDRIN) 12 % lotion, Apply topically 2 (two) times a day as needed for dry skin, Disp: 400 g, Rfl: 2    atorvastatin (LIPITOR) 20 mg tablet, Take 1 tablet (20 mg total) by mouth daily, Disp: 90 tablet, Rfl: 1    Fluticasone Propionate, Inhal, (Flovent Diskus) 250 MCG/BLIST AEPB, Inhale 1 puff daily, Disp: 60 blister, Rfl: 2    levothyroxine 200 mcg tablet, Take 1 tablet (200 mcg total) by mouth daily, Disp: 90 tablet, Rfl: 1    levothyroxine 75 mcg tablet, Take 1 tablet (75 mcg total) by mouth daily, Disp: 90 tablet, Rfl: 1    losartan (COZAAR) 50 mg tablet, Take 1 tablet (50 mg total) by mouth daily, Disp: 90 tablet, Rfl: 1    metFORMIN (GLUCOPHAGE-XR) 500 mg 24 hr tablet, Take 1 tablet (500 mg total) by mouth daily with dinner, Disp: 90 tablet, Rfl: 1    potassium chloride (K-DUR,KLOR-CON) 20 mEq tablet, Take 1 tablet (20 mEq total) by mouth daily, Disp: 30 tablet, Rfl: 0    Review of Systems   Constitutional:  Negative for chills, fatigue and fever. HENT:  Negative for congestion, hearing loss and postnasal drip. Respiratory:  Negative for cough and shortness of breath. Cardiovascular:  Positive for leg swelling (Bilateral). Musculoskeletal:  Negative for gait problem. Skin:  Positive for wound (RLE). Negative for rash. Neurological:  Negative for numbness. Hematological:  Does not bruise/bleed easily. Psychiatric/Behavioral: Negative. Objective:  /66   Pulse 78   Temp 98.2 °F (36.8 °C)   Resp 18   Pain Score: 0-No pain     Physical Exam  Vitals and nursing note reviewed. Constitutional:       Appearance: Normal appearance. He is well-developed. He is morbidly obese. HENT:      Head: Normocephalic and atraumatic. Cardiovascular:      Rate and Rhythm: Normal rate. Pulmonary:      Effort: Pulmonary effort is normal.   Skin:     General: Skin is warm and dry. Findings: Wound (RLE) present. No erythema. Comments: Healed. See wound assessment. Neurological:      Mental Status: He is alert and oriented to person, place, and time. Psychiatric:         Attention and Perception: Attention normal.         Mood and Affect: Mood and affect normal.         Behavior: Behavior is cooperative.          Cognition and Memory: Cognition normal.                   Wound 10/18/23 Venous Ulcer Pretibial Distal;Right (Active)   Wound Image Images linked 11/03/23 1434   Wound Description Epithelialization 11/03/23 1434   Sada-wound Assessment Scaly;Pink;Dry 11/03/23 1434   Wound Length (cm) 0 cm 11/03/23 1434   Wound Width (cm) 0 cm 11/03/23 1434   Wound Depth (cm) 0 cm 11/03/23 1434   Wound Surface Area (cm^2) 0 cm^2 11/03/23 1434   Wound Volume (cm^3) 0 cm^3 11/03/23 1434   Calculated Wound Volume (cm^3) 0 cm^3 11/03/23 1434 Change in Wound Size % 100 11/03/23 1434   Drainage Amount None 11/03/23 1434   Non-staged Wound Description Not applicable 35/35/24 7510   Dressing Status Removed (Unna boot) 11/03/23 1434             Wound Instructions:  Orders Placed This Encounter   Procedures    Wound cleansing and dressings     Right leg wound is healed. May leave opent to air. Cleanse daily with mild soap and water. Continue to use Ammonium lactate lotion daily. Prescription to be sent to pharmacy. Continue to wear compression and with edema control. Thank you for using the wound care center. Standing Status:   Future     Standing Expiration Date:   11/3/2024       Vee Kee PA-C, [facility]      Portions of the record may have been created with voice recognition software. Occasional wrong word or "sound alike" substitutions may have occurred due to the inherent limitations of voice recognition software. Read the chart carefully and recognize, using context, where substitutions have occurred.

## 2023-11-03 NOTE — PATIENT INSTRUCTIONS
Orders Placed This Encounter   Procedures    Wound cleansing and dressings     Right leg wound is healed. May leave opent to air. Cleanse daily with mild soap and water. Continue to use Ammonium lactate lotion daily. Prescription to be sent to pharmacy. Continue to wear compression and with edema control. Thank you for using the wound care center.      Standing Status:   Future     Standing Expiration Date:   11/3/2024

## 2023-11-07 ENCOUNTER — TELEPHONE (OUTPATIENT)
Dept: WOUND CARE | Facility: CLINIC | Age: 58
End: 2023-11-07

## 2023-11-07 NOTE — TELEPHONE ENCOUNTER
Returned patient's call regarding wound re-opening. Made appt for following week. After discussing with provider Alecia Monge Pa-C instructed patient to follow previous discharge instructions until seen in wound care center. Wash daily with mild soap and water. Apply Ammonium lactate cream. Wear compression during waking hours. Elevate extremity throughout day.

## 2023-11-14 ENCOUNTER — OFFICE VISIT (OUTPATIENT)
Dept: WOUND CARE | Facility: CLINIC | Age: 58
End: 2023-11-14
Payer: COMMERCIAL

## 2023-11-14 VITALS — SYSTOLIC BLOOD PRESSURE: 168 MMHG | DIASTOLIC BLOOD PRESSURE: 83 MMHG | HEART RATE: 64 BPM | TEMPERATURE: 98.9 F

## 2023-11-14 DIAGNOSIS — L03.115 CELLULITIS OF RIGHT LOWER EXTREMITY: ICD-10-CM

## 2023-11-14 DIAGNOSIS — L97.811 VENOUS STASIS ULCER OF OTHER PART OF RIGHT LOWER LEG LIMITED TO BREAKDOWN OF SKIN WITHOUT VARICOSE VEINS (HCC): Primary | ICD-10-CM

## 2023-11-14 DIAGNOSIS — I87.2 VENOUS STASIS ULCER OF OTHER PART OF RIGHT LOWER LEG LIMITED TO BREAKDOWN OF SKIN WITHOUT VARICOSE VEINS (HCC): Primary | ICD-10-CM

## 2023-11-14 PROCEDURE — 99214 OFFICE O/P EST MOD 30 MIN: CPT | Performed by: ORTHOPAEDIC SURGERY

## 2023-11-14 PROCEDURE — 99213 OFFICE O/P EST LOW 20 MIN: CPT | Performed by: ORTHOPAEDIC SURGERY

## 2023-11-14 RX ORDER — TRIAMCINOLONE ACETONIDE 1 MG/G
OINTMENT TOPICAL 2 TIMES DAILY
Qty: 30 G | Refills: 2 | Status: SHIPPED | OUTPATIENT
Start: 2023-11-14 | End: 2023-11-28

## 2023-11-14 RX ORDER — LIDOCAINE HYDROCHLORIDE 40 MG/ML
5 SOLUTION TOPICAL ONCE
Status: COMPLETED | OUTPATIENT
Start: 2023-11-14 | End: 2023-11-14

## 2023-11-14 RX ORDER — DOXYCYCLINE HYCLATE 100 MG/1
100 CAPSULE ORAL EVERY 12 HOURS SCHEDULED
Qty: 20 CAPSULE | Refills: 0 | Status: SHIPPED | OUTPATIENT
Start: 2023-11-14 | End: 2023-11-24

## 2023-11-14 RX ADMIN — LIDOCAINE HYDROCHLORIDE 5 ML: 40 SOLUTION TOPICAL at 14:51

## 2023-11-14 NOTE — PROGRESS NOTES
Patient ID: Nydia West is a 66 y.o. male Date of Birth 1965       Chief Complaint   Patient presents with    Follow Up Wound Care Visit     RLE Wound reopened. Allergies:  Cat hair extract    Diagnosis:   Diagnosis ICD-10-CM Associated Orders   1. Venous stasis ulcer of other part of right lower leg limited to breakdown of skin without varicose veins (HCC)  I87.2 lidocaine (XYLOCAINE) 4 % topical solution 5 mL    L97.811 Wound cleansing and dressings     Wound compression and edema control     Wound miscellaneous orders      2. Cellulitis of right lower extremity  L03.115 mupirocin (BACTROBAN) 2 % ointment     triamcinolone (KENALOG) 0.1 % ointment     doxycycline hyclate (VIBRAMYCIN) 100 mg capsule           Assessment and Plan :  Follow up evaluation of Right Venous stasis ulcer with heavy foul smelling tan serosanguinous drainage with avril-wound erythema. Increased edema and pain. Prescribed Doxycycline x 10 days for RLE cellulitis. Mechanically debrided with chlorhexidine sponge. Wound management with Mupirocin and Triamcinolone twice a day with DSD. see wound orders below   Do not wet wound. Cleanse with Chlorhexidine daily. Counseled on importance of frequent elevation of leg and increase exercise/walking for wound healing. Counseled on alcohol cessation. F/u with vascular studies as ordered. Follow-up in 1 week(s) or call sooner with questions or concerns or any   signs of infection such as redness, swelling, increased/purulent drainage, fever, chills, increased severe pain. Subjective:   12/1/22:  1st visit for this 62-year-old male referred to the wound center because of multiple ulcerations of both lower extremities. Patient states that he has had these for 2-3 months and most of then start as blisters. Some he believe started as irritation from is toe all work boot. However, he also has ulcerations on the calfs above the work boot.   He does have swelling of both lower extremities. The patient was treated for cellulitis by his primary care physician. Last time he had any ulcers on his leg was approximately one year ago. He does wear compression stockings but since his skin is broken down, he has not worn them. 12/8/2022: Follow-up venous stasis ulcers of both lower extremities. Edema of both lower extremities. Unna boots have been used and he has had no problem with them. He noticed that the edema is starting to decrease and the ulcers are also improving. No pain, fever or chills. 12/16/2022: Follow-up venous stasis ulcers of the right lower extremity. At last visit the left was healed and he is now wearing compression stocking. No complaints. Tolerating Unna boot. 12/22/2022: Follow-up venous stasis ulcers of the right lateral calf. Doing well. No complaints. No pain. Tolerating Unna boot. 12/30/2022: Follow-up venous stasis ulcer of the right lateral calf. No complaints. 1/23/2023: Patient was recently discharged from the wound center after closure of venous stasis ulcers of the right lateral calf. He has been using his compression stockings daily. He noticed a week ago that he had breakdown on the right calf in the same area. He has been using [employer] and dry dressing. He arrived without any stocking in place. 1/30/2023: Follow-up venous stasis ulcers of the right lateral calf. Doing well with [employer] He has compression stocking 15 to 20 mmHg. Not wearing though because of Unna boot. No new complaints. 2/6/2023: Follow-up venous stasis ulcer of the right lateral calf. No complaints. Doing well. No fever or chills. 2/13/2023: Follow-up venous stasis ulcers of the right lateral calf. At last visit, we changed over to Coflex instead of the [employer] [place] that the Coflex was not as tight as the Unna. .  No other complaints. 2/20/2023: Follow-up venous stasis ulcer of the right lateral calf. Did well with Unna boot.   No new complaints. 3/6/2023: Follow-up venous stasis ulcer of the right lateral calf. Unna boot. No new complaints. Has not gotten longer stockings yet. 3/13/2023: Follow-up venous stasis ulcer of the right lateral calf. At last visit, it appeared that the ulcer is completely closed but we wrapped him again. He has ordered longer length of compression stockings. He has not yet received them. 10/18: Pt presents for follow-up evaluation of RLE venous stasis ulcers. Pt has not been seen since March due to relocating to [place]. Pt received prior treatment at [facility] with Dr. Kalyani Zambrano. He now presents with persistent R VSU in which he has been leaving open to air. Pt admits he drinks a lot of beers on weekends, (approximately 8 a day). Has been wearing knee length compression stockings. Pt was ordered thigh high compression stockings of which he admits he never purchased. 10/27: Pt presents for follow-up evaluation of RLE venous stasis ulcers. Pt c/o itchy skin. Pt has been wearing Unna boot for compression as indicated. 11/3: Pt presents for follow-up evaluation of RLE venous stasis ulcers. No issues or complaints. Pt has been wearing Unna boot for compression as indicated. Denies fevers or chills. 11/14: Pt presents for follow-up evaluation of RLE venous stasis ulcers. Pt c/o pain, redness and increased foul smelling drainage from wound bed. Pt has been applying DSD and wearing compression socks. Denies fevers or chills.        The following portions of the patient's history were reviewed and updated as appropriate:   Patient Active Problem List   Diagnosis    Benign essential HTN    HLD (hyperlipidemia)    Hypothyroid    Asthma    Primary osteoarthritis of both knees    Obesity, Class III, BMI 40-49.9 (morbid obesity) ([address])    Prediabetes     Past Medical History:   Diagnosis Date    Asthma     Disease of thyroid gland     Hematoma of left axilla     Last Assessed:9/23/2015 Hypertension     Hyperthyroidism     Hypothyroidism     Toxic multinodular goiter     Last Assessed:2013     Past Surgical History:   Procedure Laterality Date    KNEE ARTHROSCOPY Left     THYROID SURGERY      Ablation     Family History   Problem Relation Age of Onset    Diabetes Mother         mellitus    Parkinsonism Father     Hypothyroidism Sister     Cancer Maternal Grandmother     Diabetes Maternal Aunt     Diabetes Maternal Uncle     Heart disease Neg Hx     Stroke Neg Hx      Social History     Socioeconomic History    Marital status: /Civil Union     Spouse name: None    Number of children: None    Years of education: None    Highest education level: None   Occupational History    Occupation:    Tobacco Use    Smoking status: Former     Packs/day: 0.75     Years: 15.00     Total pack years: 11.25     Types: Cigarettes     Start date: 1992     Quit date: 2007     Years since quittin.8    Smokeless tobacco: Never   Vaping Use    Vaping Use: Never used   Substance and Sexual Activity    Alcohol use:  Yes     Alcohol/week: 2.0 standard drinks of alcohol     Types: 2 Cans of beer per week     Comment: Social drinker     Drug use: No    Sexual activity: Yes     Partners: Female     Birth control/protection: Female Sterilization   Other Topics Concern    None   Social History Narrative    Daily coffee consumption/6 cups a day    Daily cola consumption/1 can a day    Recreational activities hiking    Travel History/Pt denies being out of the country during 2014-2014     Social Determinants of Health     Financial Resource Strain: Not on file   Food Insecurity: Not on file   Transportation Needs: Not on file   Physical Activity: Not on file   Stress: Not on file   Social Connections: Not on file   Intimate Partner Violence: Not on file   Housing Stability: Not on file       Current Outpatient Medications:     doxycycline hyclate (VIBRAMYCIN) 100 mg capsule, Take 1 capsule (100 mg total) by mouth every 12 (twelve) hours for 10 days, Disp: 20 capsule, Rfl: 0    mupirocin (BACTROBAN) 2 % ointment, Apply topically 2 (two) times a day, Disp: 30 g, Rfl: 3    triamcinolone (KENALOG) 0.1 % ointment, Apply topically 2 (two) times a day for 14 days, Disp: 30 g, Rfl: 2    ammonium lactate (LAC-HYDRIN) 12 % lotion, Apply topically 2 (two) times a day as needed for dry skin, Disp: 400 g, Rfl: 2    atorvastatin (LIPITOR) 20 mg tablet, Take 1 tablet (20 mg total) by mouth daily, Disp: 90 tablet, Rfl: 1    Fluticasone Propionate, Inhal, (Flovent Diskus) 250 MCG/BLIST AEPB, Inhale 1 puff daily, Disp: 60 blister, Rfl: 2    levothyroxine 200 mcg tablet, Take 1 tablet (200 mcg total) by mouth daily, Disp: 90 tablet, Rfl: 1    levothyroxine 75 mcg tablet, Take 1 tablet (75 mcg total) by mouth daily, Disp: 90 tablet, Rfl: 1    losartan (COZAAR) 50 mg tablet, Take 1 tablet (50 mg total) by mouth daily, Disp: 90 tablet, Rfl: 1    metFORMIN (GLUCOPHAGE-XR) 500 mg 24 hr tablet, Take 1 tablet (500 mg total) by mouth daily with dinner, Disp: 90 tablet, Rfl: 1    potassium chloride (K-DUR,KLOR-CON) 20 mEq tablet, Take 1 tablet (20 mEq total) by mouth daily, Disp: 30 tablet, Rfl: 0  No current facility-administered medications for this visit. Review of Systems   Constitutional:  Negative for chills, fatigue and fever. HENT:  Negative for congestion, hearing loss and postnasal drip. Respiratory:  Negative for cough and shortness of breath. Cardiovascular:  Positive for leg swelling (Bilateral). Musculoskeletal:  Negative for gait problem. Skin:  Positive for wound (RLE). Negative for rash. Neurological:  Negative for numbness. Hematological:  Does not bruise/bleed easily. Psychiatric/Behavioral: Negative. Objective:  /83   Pulse 64   Temp 98.9 °F (37.2 °C)   Pain Score:   3     Physical Exam  Vitals and nursing note reviewed. Constitutional:       Appearance: Normal appearance. He is well-developed. He is morbidly obese. HENT:      Head: Normocephalic and atraumatic. Cardiovascular:      Rate and Rhythm: Normal rate. Pulmonary:      Effort: Pulmonary effort is normal.   Skin:     General: Skin is warm and dry. Findings: Wound (RLE) present. No erythema. Comments: Warm, painful, erythematous wound bed with foul smelling tan, serosanguinous drainage. See wound assessment. Neurological:      Mental Status: He is alert and oriented to person, place, and time. Psychiatric:         Attention and Perception: Attention normal.         Mood and Affect: Mood and affect normal.         Behavior: Behavior is cooperative. Cognition and Memory: Cognition normal.                   Wound 10/18/23 Venous Ulcer Pretibial Distal;Right (Active)   Wound Image Images linked 11/14/23 1448   Wound Description Pink; White;Epithelialization;Brown;Slough; Yellow 11/14/23 1448   Sada-wound Assessment Scaly;Pink;Dry 11/14/23 1448   Wound Length (cm) 22.5 cm 11/14/23 1448   Wound Width (cm) 44.5 cm 11/14/23 1448   Wound Depth (cm) 0.1 cm 11/14/23 1448   Wound Surface Area (cm^2) 1001. 25 cm^2 11/14/23 1448   Wound Volume (cm^3) 100.125 cm^3 11/14/23 1448   Calculated Wound Volume (cm^3) 100.13 cm^3 11/14/23 1448   Change in Wound Size % 26.56 11/14/23 1448   Drainage Amount Large 11/14/23 1448   Drainage Description Tan;Serosanguineous; Foul smelling 11/14/23 1448   Non-staged Wound Description Full thickness 11/14/23 1448   Dressing Status Intact 11/14/23 1448         Wound Instructions:  Orders Placed This Encounter   Procedures    Wound cleansing and dressings     RLE wound:     Wash your hands with soap and water. Remove old dressing, discard into plastic bag and place in trash. Cleanse the wound with hibiclens prior to applying a clean dressing. Do not use tissue or cotton balls. Do not scrub the wound. Pat dry using gauze.  ( hibilclens from pharmacy over the counter cleanser)  Shower no unless able to use a cast cover    Apply mupirocin to the open wound followed by triamcinolone cream  Cover with gauze  Secure with rolled gauze and tape  Change dressing 2 times daily. The above was completed today at the wound center. Standing Status:   Future     Standing Expiration Date:   11/14/2024    Wound compression and edema control     Elastic Tubular Stocking: Spanda- F    Tubular elastic bandage: Apply from base of toes to behind the knee. Apply in AM, may remove for sleep. Avoid prolonged standing in one place. Elevate leg(s) above the level of the heart when sitting or as much as possible. Standing Status:   Future     Standing Expiration Date:   11/14/2024    Wound miscellaneous orders      medications from pharmacy. Standing Status:   Future     Standing Expiration Date:   11/14/2024       Margret Newton PA-C, [facility]      Portions of the record may have been created with voice recognition software. Occasional wrong word or "sound alike" substitutions may have occurred due to the inherent limitations of voice recognition software. Read the chart carefully and recognize, using context, where substitutions have occurred.

## 2023-11-14 NOTE — PATIENT INSTRUCTIONS
Orders Placed This Encounter   Procedures    Wound cleansing and dressings     RLE wound:     Wash your hands with soap and water. Remove old dressing, discard into plastic bag and place in trash. Cleanse the wound with hibiclens prior to applying a clean dressing. Do not use tissue or cotton balls. Do not scrub the wound. Pat dry using gauze. ( hibilclens from pharmacy over the counter cleanser)  Shower no unless able to use a cast cover    Apply mupirocin to the open wound followed by triamcinolone cream  Cover with gauze  Secure with rolled gauze and tape  Change dressing 2 times daily. The above was completed today at the wound center. Standing Status:   Future     Standing Expiration Date:   11/14/2024    Wound compression and edema control     Elastic Tubular Stocking: Spanda- F    Tubular elastic bandage: Apply from base of toes to behind the knee. Apply in AM, may remove for sleep. Avoid prolonged standing in one place. Elevate leg(s) above the level of the heart when sitting or as much as possible. Standing Status:   Future     Standing Expiration Date:   11/14/2024    Wound miscellaneous orders      medications from pharmacy.      Standing Status:   Future     Standing Expiration Date:   11/14/2024

## 2023-11-21 ENCOUNTER — OFFICE VISIT (OUTPATIENT)
Dept: WOUND CARE | Facility: CLINIC | Age: 58
End: 2023-11-21
Payer: COMMERCIAL

## 2023-11-21 VITALS
DIASTOLIC BLOOD PRESSURE: 70 MMHG | TEMPERATURE: 97.3 F | RESPIRATION RATE: 18 BRPM | SYSTOLIC BLOOD PRESSURE: 155 MMHG | HEART RATE: 71 BPM

## 2023-11-21 DIAGNOSIS — I87.2 VENOUS STASIS ULCER OF OTHER PART OF RIGHT LOWER LEG LIMITED TO BREAKDOWN OF SKIN WITHOUT VARICOSE VEINS (HCC): Primary | ICD-10-CM

## 2023-11-21 DIAGNOSIS — L30.8 OTHER ECZEMA: ICD-10-CM

## 2023-11-21 DIAGNOSIS — L03.115 CELLULITIS OF RIGHT LOWER EXTREMITY: ICD-10-CM

## 2023-11-21 DIAGNOSIS — L97.811 VENOUS STASIS ULCER OF OTHER PART OF RIGHT LOWER LEG LIMITED TO BREAKDOWN OF SKIN WITHOUT VARICOSE VEINS (HCC): Primary | ICD-10-CM

## 2023-11-21 PROCEDURE — 99212 OFFICE O/P EST SF 10 MIN: CPT | Performed by: ORTHOPAEDIC SURGERY

## 2023-11-21 PROCEDURE — 99213 OFFICE O/P EST LOW 20 MIN: CPT | Performed by: ORTHOPAEDIC SURGERY

## 2023-11-21 NOTE — PATIENT INSTRUCTIONS
Orders Placed This Encounter   Procedures    Wound cleansing and dressings     Right leg wound is healed. Cleanse daily with mild soap and water. May continue to use prescribed cream for an additional week. Follow up with dermatology per referral.     Continue to wear compression. Thank you for using the wound center.      Standing Status:   Future     Standing Expiration Date:   11/21/2024    Ambulatory Referral to Dermatology     Standing Status:   Future     Standing Expiration Date:   11/21/2024     Referral Priority:   Routine     Referral Type:   Consult - AMB     Referral Reason:   Specialty Services Required     Requested Specialty:   Dermatology     Number of Visits Requested:   1     Expiration Date:   11/21/2024

## 2023-11-21 NOTE — PROGRESS NOTES
Patient ID: Ora Garcia is a 66 y.o. male Date of Birth 1965       Chief Complaint   Patient presents with    Follow Up Wound Care Visit     RLE wound       Allergies:  Cat hair extract    Diagnosis:   Diagnosis ICD-10-CM Associated Orders   1. Venous stasis ulcer of other part of right lower leg limited to breakdown of skin without varicose veins ([facility])  I87.2 Wound cleansing and dressings    L97.811 Ambulatory Referral to Dermatology      2. Cellulitis of right lower extremity  L03.115 Wound cleansing and dressings      3. Eczema, unspecified type  L30.9 Ambulatory Referral to Dermatology           Assessment and Plan :  Follow up evaluation of Right Venous stasis ulcer. RLE cellulitis resolved and pt responded to Doxycycline. Wound is closed completely epithelialized continue Mupirocin and Triamcinolone twice a day to complete 14 days for eczematous dermatitis. Referred to dermatology. Discussed the importance of long-term edema control and use of long-term compression. Recommended 15-20 mmHg compression stockings. Counseled on importance of frequent elevation of leg and increase exercise/walking. F/u with vascular studies as ordered. Follow up as needed or call with questions or concerns    Subjective:   12/1/22:  1st visit for this 29-year-old male referred to the wound center because of multiple ulcerations of both lower extremities. Patient states that he has had these for 2-3 months and most of then start as blisters. Some he believe started as irritation from is toe all work boot. However, he also has ulcerations on the calfs above the work boot. He does have swelling of both lower extremities. The patient was treated for cellulitis by his primary care physician. Last time he had any ulcers on his leg was approximately one year ago. He does wear compression stockings but since his skin is broken down, he has not worn them. 12/8/2022:  Follow-up venous stasis ulcers of both lower extremities. Edema of both lower extremities. Unna boots have been used and he has had no problem with them. He noticed that the edema is starting to decrease and the ulcers are also improving. No pain, fever or chills. 12/16/2022: Follow-up venous stasis ulcers of the right lower extremity. At last visit the left was healed and he is now wearing compression stocking. No complaints. Tolerating Unna boot. 12/22/2022: Follow-up venous stasis ulcers of the right lateral calf. Doing well. No complaints. No pain. Tolerating Unna boot. 12/30/2022: Follow-up venous stasis ulcer of the right lateral calf. No complaints. 1/23/2023: Patient was recently discharged from the wound center after closure of venous stasis ulcers of the right lateral calf. He has been using his compression stockings daily. He noticed a week ago that he had breakdown on the right calf in the same area. He has been using [employer] and dry dressing. He arrived without any stocking in place. 1/30/2023: Follow-up venous stasis ulcers of the right lateral calf. Doing well with [employer] He has compression stocking 15 to 20 mmHg. Not wearing though because of Unna boot. No new complaints. 2/6/2023: Follow-up venous stasis ulcer of the right lateral calf. No complaints. Doing well. No fever or chills. 2/13/2023: Follow-up venous stasis ulcers of the right lateral calf. At last visit, we changed over to Coflex instead of the [employer] [place] that the Coflex was not as tight as the Unna. .  No other complaints. 2/20/2023: Follow-up venous stasis ulcer of the right lateral calf. Did well with Unna boot. No new complaints. 3/6/2023: Follow-up venous stasis ulcer of the right lateral calf. Unna boot. No new complaints. Has not gotten longer stockings yet. 3/13/2023: Follow-up venous stasis ulcer of the right lateral calf. At last visit, it appeared that the ulcer is completely closed but we wrapped him again. He has ordered longer length of compression stockings. He has not yet received them. 10/18: Pt presents for follow-up evaluation of RLE venous stasis ulcers. Pt has not been seen since March due to relocating to [address]. Pt received prior treatment at [facility] with Dr. Nataliya Jensen. He now presents with persistent R VSU in which he has been leaving open to air. Pt admits he drinks a lot of beers on weekends, (approximately 8 a day). Has been wearing knee length compression stockings. Pt was ordered thigh high compression stockings of which he admits he never purchased. 10/27: Pt presents for follow-up evaluation of RLE venous stasis ulcers. Pt c/o itchy skin. Pt has been wearing Unna boot for compression as indicated. 11/3: Pt presents for follow-up evaluation of RLE venous stasis ulcers. No issues or complaints. Pt has been wearing Unna boot for compression as indicated. Denies fevers or chills. 11/14: Pt presents for follow-up evaluation of RLE venous stasis ulcers. Pt c/o pain, redness and increased foul smelling drainage from wound bed. Pt has been applying DSD and wearing compression socks. Denies fevers or chills. 11/21: Pt presents for follow-up evaluation of RLE venous stasis ulcers. No issues. Pt denies pain or drainage. Pt has been applying Mupirocin and Triamcinolone twice a day with DSD and wearing compression socks. Denies fevers or chills.        The following portions of the patient's history were reviewed and updated as appropriate:   Patient Active Problem List   Diagnosis    Benign essential HTN    HLD (hyperlipidemia)    Hypothyroid    Asthma    Primary osteoarthritis of both knees    Obesity, Class III, BMI 40-49.9 (morbid obesity) ([address])    Prediabetes     Past Medical History:   Diagnosis Date    Asthma     Disease of thyroid gland     Hematoma of left axilla     Last Assessed:9/23/2015    Hypertension     Hyperthyroidism     Hypothyroidism     Toxic multinodular goiter     Last Assessed:2013     Past Surgical History:   Procedure Laterality Date    KNEE ARTHROSCOPY Left     THYROID SURGERY      Ablation     Family History   Problem Relation Age of Onset    Diabetes Mother         mellitus    Parkinsonism Father     Hypothyroidism Sister     Cancer Maternal Grandmother     Diabetes Maternal Aunt     Diabetes Maternal Uncle     Heart disease Neg Hx     Stroke Neg Hx      Social History     Socioeconomic History    Marital status: /Civil Union     Spouse name: None    Number of children: None    Years of education: None    Highest education level: None   Occupational History    Occupation:    Tobacco Use    Smoking status: Former     Packs/day: 0.75     Years: 15.00     Total pack years: 11.25     Types: Cigarettes     Start date: 1992     Quit date: 2007     Years since quittin.8    Smokeless tobacco: Never   Vaping Use    Vaping Use: Never used   Substance and Sexual Activity    Alcohol use:  Yes     Alcohol/week: 2.0 standard drinks of alcohol     Types: 2 Cans of beer per week     Comment: Social drinker     Drug use: No    Sexual activity: Yes     Partners: Female     Birth control/protection: Female Sterilization   Other Topics Concern    None   Social History Narrative    Daily coffee consumption/6 cups a day    Daily cola consumption/1 can a day    Recreational activities hiking    Travel History/Pt denies being out of the country during 2014-2014     Social Determinants of Health     Financial Resource Strain: Not on file   Food Insecurity: Not on file   Transportation Needs: Not on file   Physical Activity: Not on file   Stress: Not on file   Social Connections: Not on file   Intimate Partner Violence: Not on file   Housing Stability: Not on file       Current Outpatient Medications:     ammonium lactate (LAC-HYDRIN) 12 % lotion, Apply topically 2 (two) times a day as needed for dry skin, Disp: 400 g, Rfl: 2    atorvastatin (LIPITOR) 20 mg tablet, Take 1 tablet (20 mg total) by mouth daily, Disp: 90 tablet, Rfl: 1    doxycycline hyclate (VIBRAMYCIN) 100 mg capsule, Take 1 capsule (100 mg total) by mouth every 12 (twelve) hours for 10 days, Disp: 20 capsule, Rfl: 0    Fluticasone Propionate, Inhal, (Flovent Diskus) 250 MCG/BLIST AEPB, Inhale 1 puff daily, Disp: 60 blister, Rfl: 2    levothyroxine 200 mcg tablet, Take 1 tablet (200 mcg total) by mouth daily, Disp: 90 tablet, Rfl: 1    levothyroxine 75 mcg tablet, Take 1 tablet (75 mcg total) by mouth daily, Disp: 90 tablet, Rfl: 1    losartan (COZAAR) 50 mg tablet, Take 1 tablet (50 mg total) by mouth daily, Disp: 90 tablet, Rfl: 1    metFORMIN (GLUCOPHAGE-XR) 500 mg 24 hr tablet, Take 1 tablet (500 mg total) by mouth daily with dinner, Disp: 90 tablet, Rfl: 1    mupirocin (BACTROBAN) 2 % ointment, Apply topically 2 (two) times a day, Disp: 30 g, Rfl: 3    potassium chloride (K-DUR,KLOR-CON) 20 mEq tablet, Take 1 tablet (20 mEq total) by mouth daily, Disp: 30 tablet, Rfl: 0    triamcinolone (KENALOG) 0.1 % ointment, Apply topically 2 (two) times a day for 14 days, Disp: 30 g, Rfl: 2    Review of Systems   Constitutional:  Negative for chills, fatigue and fever. HENT:  Negative for congestion, hearing loss and postnasal drip. Respiratory:  Negative for cough and shortness of breath. Cardiovascular:  Positive for leg swelling (Bilateral). Musculoskeletal:  Negative for gait problem. Skin:  Positive for wound (RLE). Negative for rash. Neurological:  Negative for numbness. Hematological:  Does not bruise/bleed easily. Psychiatric/Behavioral: Negative. Objective:  /70   Pulse 71   Temp (!) 97.3 °F (36.3 °C)   Resp 18   Pain Score: 0-No pain     Physical Exam  Vitals and nursing note reviewed. Constitutional:       Appearance: Normal appearance. He is well-developed. He is morbidly obese. HENT:      Head: Normocephalic and atraumatic.    Cardiovascular: Rate and Rhythm: Normal rate. Pulmonary:      Effort: Pulmonary effort is normal.   Skin:     General: Skin is warm and dry. Findings: Wound (RLE) present. No erythema. Comments: healed. See wound assessment. Neurological:      Mental Status: He is alert and oriented to person, place, and time. Psychiatric:         Attention and Perception: Attention normal.         Mood and Affect: Mood and affect normal.         Behavior: Behavior is cooperative. Cognition and Memory: Cognition normal.                 Wound 10/18/23 Venous Ulcer Pretibial Distal;Right (Active)   Wound Image Images linked 11/21/23 1413   Wound Description Epithelialization 11/21/23 1413   Sada-wound Assessment Scaly;Pink;Dry 11/21/23 1413   Wound Length (cm) 0 cm 11/21/23 1413   Wound Width (cm) 0 cm 11/21/23 1413   Wound Depth (cm) 0 cm 11/21/23 1413   Wound Surface Area (cm^2) 0 cm^2 11/21/23 1413   Wound Volume (cm^3) 0 cm^3 11/21/23 1413   Calculated Wound Volume (cm^3) 0 cm^3 11/21/23 1413   Change in Wound Size % 100 11/21/23 1413   Drainage Amount None 11/21/23 1413   Non-staged Wound Description Not applicable 36/92/15 5623   Dressing Status Intact 11/21/23 1413         Wound Instructions:  Orders Placed This Encounter   Procedures    Wound cleansing and dressings     Right leg wound is healed. Cleanse daily with mild soap and water. May continue to use prescribed cream for an additional week. Follow up with dermatology per referral.     Continue to wear compression. Thank you for using the wound center.      Standing Status:   Future     Standing Expiration Date:   11/21/2024    Ambulatory Referral to Dermatology     Standing Status:   Future     Standing Expiration Date:   11/21/2024     Referral Priority:   Routine     Referral Type:   Consult - AMB     Referral Reason:   Specialty Services Required     Requested Specialty:   Dermatology     Number of Visits Requested:   1     Expiration Date:   11/21/2024       Los Valentin PA-C, [facility]      Portions of the record may have been created with voice recognition software. Occasional wrong word or "sound alike" substitutions may have occurred due to the inherent limitations of voice recognition software. Read the chart carefully and recognize, using context, where substitutions have occurred.

## 2024-01-09 ENCOUNTER — RA CDI HCC (OUTPATIENT)
Dept: OTHER | Facility: HOSPITAL | Age: 59
End: 2024-01-09

## 2024-01-09 NOTE — PROGRESS NOTES
HCC coding opportunities          Chart Reviewed number of suggestions sent to Provider: 2   J45.909  E66.01  This is a reminder to address (resolve/update/assess) ALL HCC (risk adjustment) codes as found on active problem list for 2024 as patient scores reset to zero MARILEE.  Also, just a reminder to please review and assess all other chronic conditions for 2024  Patients Insurance        Commercial Insurance: [employer] Commercial Insurance

## 2024-01-13 ENCOUNTER — APPOINTMENT (OUTPATIENT)
Dept: LAB | Facility: MEDICAL CENTER | Age: 59
End: 2024-01-13
Payer: COMMERCIAL

## 2024-01-13 DIAGNOSIS — R73.03 PREDIABETES: ICD-10-CM

## 2024-01-13 DIAGNOSIS — E03.9 HYPOTHYROIDISM, UNSPECIFIED TYPE: ICD-10-CM

## 2024-01-13 DIAGNOSIS — I10 BENIGN ESSENTIAL HTN: Chronic | ICD-10-CM

## 2024-01-13 LAB
ANION GAP SERPL CALCULATED.3IONS-SCNC: 4 MMOL/L
BUN SERPL-MCNC: 17 MG/DL (ref 5–25)
CALCIUM SERPL-MCNC: 9.1 MG/DL (ref 8.4–10.2)
CHLORIDE SERPL-SCNC: 103 MMOL/L (ref 96–108)
CO2 SERPL-SCNC: 32 MMOL/L (ref 21–32)
CREAT SERPL-MCNC: 0.79 MG/DL (ref 0.6–1.3)
EST. AVERAGE GLUCOSE BLD GHB EST-MCNC: 134 MG/DL
GFR SERPL CREATININE-BSD FRML MDRD: 100 ML/MIN/1.73SQ M
GLUCOSE P FAST SERPL-MCNC: 100 MG/DL (ref 65–99)
HBA1C MFR BLD: 6.3 %
POTASSIUM SERPL-SCNC: 4.5 MMOL/L (ref 3.5–5.3)
SODIUM SERPL-SCNC: 139 MMOL/L (ref 135–147)
T4 FREE SERPL-MCNC: 1.01 NG/DL (ref 0.61–1.12)
TSH SERPL DL<=0.05 MIU/L-ACNC: 4.79 UIU/ML (ref 0.45–4.5)

## 2024-01-13 PROCEDURE — 80048 BASIC METABOLIC PNL TOTAL CA: CPT

## 2024-01-13 PROCEDURE — 36415 COLL VENOUS BLD VENIPUNCTURE: CPT

## 2024-01-13 PROCEDURE — 84439 ASSAY OF FREE THYROXINE: CPT

## 2024-01-13 PROCEDURE — 83036 HEMOGLOBIN GLYCOSYLATED A1C: CPT

## 2024-01-13 PROCEDURE — 84443 ASSAY THYROID STIM HORMONE: CPT

## 2024-01-16 ENCOUNTER — OFFICE VISIT (OUTPATIENT)
Dept: INTERNAL MEDICINE CLINIC | Age: 59
End: 2024-01-16
Payer: COMMERCIAL

## 2024-01-16 VITALS
DIASTOLIC BLOOD PRESSURE: 80 MMHG | SYSTOLIC BLOOD PRESSURE: 138 MMHG | OXYGEN SATURATION: 96 % | HEIGHT: 71 IN | WEIGHT: 299 LBS | HEART RATE: 90 BPM | TEMPERATURE: 97.5 F | BODY MASS INDEX: 41.86 KG/M2

## 2024-01-16 DIAGNOSIS — Z12.11 SCREENING FOR MALIGNANT NEOPLASM OF COLON: Primary | ICD-10-CM

## 2024-01-16 DIAGNOSIS — R73.03 PREDIABETES: ICD-10-CM

## 2024-01-16 DIAGNOSIS — I87.2 VENOUS ULCER OF LEFT LOWER EXTREMITY WITHOUT VARICOSE VEINS (HCC): ICD-10-CM

## 2024-01-16 DIAGNOSIS — I10 BENIGN ESSENTIAL HTN: Chronic | ICD-10-CM

## 2024-01-16 DIAGNOSIS — Z11.59 ENCOUNTER FOR HEPATITIS C SCREENING TEST FOR LOW RISK PATIENT: ICD-10-CM

## 2024-01-16 DIAGNOSIS — E03.9 HYPOTHYROIDISM, UNSPECIFIED TYPE: Chronic | ICD-10-CM

## 2024-01-16 DIAGNOSIS — L97.929 VENOUS ULCER OF LEFT LOWER EXTREMITY WITHOUT VARICOSE VEINS (HCC): ICD-10-CM

## 2024-01-16 PROCEDURE — 99214 OFFICE O/P EST MOD 30 MIN: CPT | Performed by: INTERNAL MEDICINE

## 2024-01-16 RX ORDER — PREDNISONE 20 MG/1
TABLET ORAL
COMMUNITY
Start: 2024-01-11

## 2024-01-16 RX ORDER — CLOBETASOL PROPIONATE 0.5 MG/G
OINTMENT TOPICAL
COMMUNITY
Start: 2024-01-11

## 2024-01-16 RX ORDER — TACROLIMUS 1 MG/G
OINTMENT TOPICAL
COMMUNITY
Start: 2024-01-15

## 2024-01-16 NOTE — PROGRESS NOTES
Assessment/Plan:     Diagnoses and all orders for this visit:    Screening for malignant neoplasm of colon  -     Cologuard    Benign essential HTN  -     Basic metabolic panel; Future    Hypothyroidism, unspecified type  -     Basic metabolic panel; Future  -     Lipid panel; Future  -     Vitamin B12; Future  -     TSH, 3rd generation with Free T4 reflex; Future    Prediabetes  -     Hemoglobin A1C; Future    Venous ulcer of left lower extremity without varicose veins (HCC)    Encounter for hepatitis C screening test for low risk patient  -     Hepatitis C antibody; Future    Other orders  -     clobetasol (TEMOVATE) 0.05 % ointment  -     tacrolimus (PROTOPIC) 0.1 % ointment  -     predniSONE 20 mg tablet           M*Modal software was used to dictate this note.  It may contain errors with dictating incorrect words or incorrect spelling. Please contact the provider directly with any questions.    Subjective:   Chief Complaint   Patient presents with    Follow-up     4 month- labs done 1/13-         Patient ID: Haja Shen is a 58 y.o. male.    This is a very pleasant 58 years young gentleman who is here today for the regular follow-up    Patient is here today for the follow-up.   Hypertension. I reviewed antihypertensive medication, patient does not have any side effects of  medications, no signs or symptoms of hypertension ,hypotension or orthostatic hypotension.  Patient is compliant with medications.  Blood workup related to hypertensive diagnosis reviewed.  Plan is to continue with the present management.  We will follow-up as a scheduled and adjust the doses of the medication as indicated.  Stolle blood pressure could be better systolic blood pressure is 138 although he recently lost about 11 pounds    Patient is here for the follow-up of hypothyroidism.  TSH reviewed, patient does not have any symptoms of hypo or hyperthyroidism.  Denying any dry skin constipation, joint pains.  No side effects of the  medication.  We will follow-up with a TSH and adjust the dose of levothyroxine as indicated secondary to radioactive iodine treatment he is TSH is slightly high but not to the point where I need to change the dose    Prediabetes he is on metformin but he was not taking his hemoglobin A1c is slightly elevated as compared to before    Hypercholesterolemia will follow-up with the lipid panel he is on medication for cholesterol    Make venous insufficiency and the leg ulcers he was seen by and followed up by the wound care the ulcers were improving and the wound care team put a consult for the dermatology and since then he is doing better he is cream was changed and now he will be on p.o. prednisone        The following portions of the patient's history were reviewed and updated as appropriate: allergies, current medications, past family history, past medical history, past social history, past surgical history, and problem list.    Review of Systems   Constitutional:  Negative for appetite change, fatigue and fever.   HENT:  Negative for congestion, ear pain, hearing loss, nosebleeds, sneezing, tinnitus and voice change.    Eyes:  Negative for pain, discharge and redness.   Respiratory:  Negative for cough, chest tightness and wheezing.    Cardiovascular:  Negative for chest pain, palpitations and leg swelling.   Gastrointestinal:  Negative for abdominal pain, blood in stool, constipation, diarrhea, nausea and vomiting.   Genitourinary:  Negative for difficulty urinating, dysuria, hematuria and urgency.   Musculoskeletal:  Negative for arthralgias, back pain, gait problem and joint swelling.   Skin:  Positive for rash (Ulcer and stasis dermatitis on the right leg). Negative for wound.   Allergic/Immunologic: Negative for environmental allergies.   Neurological:  Negative for dizziness, tremors, seizures, weakness, light-headedness and numbness.   Hematological:  Negative for adenopathy. Does not bruise/bleed easily.    Psychiatric/Behavioral:  Negative for behavioral problems and confusion. The patient is not nervous/anxious.          Past Medical History:   Diagnosis Date    Asthma     Disease of thyroid gland     Hematoma of left axilla     Last Assessed:9/23/2015    Hypertension     Hyperthyroidism     Hypothyroidism     Toxic multinodular goiter     Last Assessed:12/26/2013         Current Outpatient Medications:     ammonium lactate (LAC-HYDRIN) 12 % lotion, Apply topically 2 (two) times a day as needed for dry skin, Disp: 400 g, Rfl: 2    atorvastatin (LIPITOR) 20 mg tablet, Take 1 tablet (20 mg total) by mouth daily, Disp: 90 tablet, Rfl: 1    clobetasol (TEMOVATE) 0.05 % ointment, , Disp: , Rfl:     Fluticasone Propionate, Inhal, (Flovent Diskus) 250 MCG/BLIST AEPB, Inhale 1 puff daily, Disp: 60 blister, Rfl: 2    levothyroxine 200 mcg tablet, Take 1 tablet (200 mcg total) by mouth daily, Disp: 90 tablet, Rfl: 1    levothyroxine 75 mcg tablet, Take 1 tablet (75 mcg total) by mouth daily, Disp: 90 tablet, Rfl: 1    losartan (COZAAR) 50 mg tablet, Take 1 tablet (50 mg total) by mouth daily, Disp: 90 tablet, Rfl: 1    metFORMIN (GLUCOPHAGE-XR) 500 mg 24 hr tablet, Take 1 tablet (500 mg total) by mouth daily with dinner, Disp: 90 tablet, Rfl: 1    mupirocin (BACTROBAN) 2 % ointment, Apply topically 2 (two) times a day, Disp: 30 g, Rfl: 3    predniSONE 20 mg tablet, , Disp: , Rfl:     tacrolimus (PROTOPIC) 0.1 % ointment, , Disp: , Rfl:     triamcinolone (KENALOG) 0.1 % ointment, Apply topically 2 (two) times a day for 14 days, Disp: 30 g, Rfl: 2    Allergies   Allergen Reactions    Cat Hair Extract      Eye irritation       Social History   Past Surgical History:   Procedure Laterality Date    KNEE ARTHROSCOPY Left     THYROID SURGERY      Ablation     Family History   Problem Relation Age of Onset    Diabetes Mother         mellitus    Parkinsonism Father     Hypothyroidism Sister     Cancer Maternal Grandmother      "Diabetes Maternal Aunt     Diabetes Maternal Uncle     Heart disease Neg Hx     Stroke Neg Hx        Objective:  /80 (BP Location: Left arm, Patient Position: Sitting, Cuff Size: Large)   Pulse 90   Temp 97.5 °F (36.4 °C) (Temporal)   Ht 5' 11\" (1.803 m)   Wt 136 kg (299 lb)   SpO2 96% Comment: room air  BMI 41.70 kg/m²        Physical Exam  Constitutional:       Appearance: He is well-developed. He is obese.   HENT:      Right Ear: Tympanic membrane and external ear normal.      Left Ear: Tympanic membrane normal.   Eyes:      Conjunctiva/sclera: Conjunctivae normal.      Pupils: Pupils are equal, round, and reactive to light.   Neck:      Thyroid: No thyromegaly.      Vascular: No JVD.   Cardiovascular:      Rate and Rhythm: Normal rate and regular rhythm.      Heart sounds: Normal heart sounds.   Pulmonary:      Breath sounds: Normal breath sounds.   Abdominal:      General: Bowel sounds are normal.      Palpations: Abdomen is soft.   Musculoskeletal:         General: Normal range of motion.      Cervical back: Normal range of motion.   Lymphadenopathy:      Cervical: No cervical adenopathy.   Skin:     General: Skin is dry.      Findings: Lesion (Leg ulcer and stasis dermatitis with the open wound followed up by the wound care and dermatology) present.   Neurological:      General: No focal deficit present.      Mental Status: He is alert and oriented to person, place, and time. Mental status is at baseline.      Deep Tendon Reflexes: Reflexes are normal and symmetric.   Psychiatric:         Mood and Affect: Mood normal.         Behavior: Behavior normal.           "

## 2024-01-18 ENCOUNTER — HOSPITAL ENCOUNTER (OUTPATIENT)
Dept: NON INVASIVE DIAGNOSTICS | Facility: CLINIC | Age: 59
Discharge: HOME | End: 2024-01-18
Payer: COMMERCIAL

## 2024-01-18 DIAGNOSIS — I87.2 VENOUS STASIS ULCER OF OTHER PART OF RIGHT LOWER LEG LIMITED TO BREAKDOWN OF SKIN WITHOUT VARICOSE VEINS (HCC): ICD-10-CM

## 2024-01-18 DIAGNOSIS — L97.921 NON-PRESSURE CHRONIC ULCER LEFT LOWER LEG, LIMITED TO BREAKDOWN SKIN (HCC): ICD-10-CM

## 2024-01-18 DIAGNOSIS — L97.811 VENOUS STASIS ULCER OF OTHER PART OF RIGHT LOWER LEG LIMITED TO BREAKDOWN OF SKIN WITHOUT VARICOSE VEINS (HCC): ICD-10-CM

## 2024-01-18 PROCEDURE — 93970 EXTREMITY STUDY: CPT | Performed by: SURGERY

## 2024-01-18 PROCEDURE — 93970 EXTREMITY STUDY: CPT

## 2024-03-11 ENCOUNTER — TELEPHONE (OUTPATIENT)
Dept: INTERNAL MEDICINE CLINIC | Facility: OTHER | Age: 59
End: 2024-03-11

## 2024-03-11 DIAGNOSIS — E03.9 HYPOTHYROIDISM, UNSPECIFIED TYPE: ICD-10-CM

## 2024-03-11 DIAGNOSIS — R73.03 PREDIABETES: ICD-10-CM

## 2024-03-11 DIAGNOSIS — J45.909 PERSISTENT ASTHMA WITHOUT COMPLICATION, UNSPECIFIED ASTHMA SEVERITY: ICD-10-CM

## 2024-03-11 DIAGNOSIS — I10 ESSENTIAL HYPERTENSION, BENIGN: ICD-10-CM

## 2024-03-11 DIAGNOSIS — E78.00 HYPERCHOLESTEROLEMIA: ICD-10-CM

## 2024-03-11 RX ORDER — LEVOTHYROXINE SODIUM 0.07 MG/1
75 TABLET ORAL DAILY
Qty: 90 TABLET | Refills: 1 | Status: SHIPPED | OUTPATIENT
Start: 2024-03-11

## 2024-03-11 RX ORDER — ATORVASTATIN CALCIUM 20 MG/1
20 TABLET, FILM COATED ORAL DAILY
Qty: 90 TABLET | Refills: 1 | Status: SHIPPED | OUTPATIENT
Start: 2024-03-11

## 2024-03-11 RX ORDER — LOSARTAN POTASSIUM 50 MG/1
50 TABLET ORAL DAILY
Qty: 90 TABLET | Refills: 1 | Status: SHIPPED | OUTPATIENT
Start: 2024-03-11

## 2024-03-11 RX ORDER — METFORMIN HYDROCHLORIDE 500 MG/1
500 TABLET, EXTENDED RELEASE ORAL
Qty: 90 TABLET | Refills: 1 | Status: SHIPPED | OUTPATIENT
Start: 2024-03-11

## 2024-03-11 RX ORDER — LEVOTHYROXINE SODIUM 0.2 MG/1
200 TABLET ORAL DAILY
Qty: 90 TABLET | Refills: 1 | Status: SHIPPED | OUTPATIENT
Start: 2024-03-11

## 2024-03-11 NOTE — TELEPHONE ENCOUNTER
Pharmacy left a message because the Fluticasone 250 milligram is requiring prior authorization through the patients insurance. Patient has [employer] ID number is [id number]. Phone number for the insurance is going to be [phone]. Pharmacy sent it through [employer].

## 2024-03-14 DIAGNOSIS — J45.20 MILD INTERMITTENT ASTHMA WITHOUT COMPLICATION: Primary | Chronic | ICD-10-CM

## 2024-03-14 RX ORDER — MOMETASONE FUROATE 100 UG/1
2 AEROSOL RESPIRATORY (INHALATION) EVERY MORNING
Qty: 39 G | Refills: 5 | Status: SHIPPED | OUTPATIENT
Start: 2024-03-14 | End: 2024-03-18

## 2024-03-15 NOTE — TELEPHONE ENCOUNTER
Pharmacy called to say Asmanex is on backorder and they don't know when they will get it. They are asking to order an alternative.

## 2024-03-18 DIAGNOSIS — J45.20 MILD INTERMITTENT ASTHMA WITHOUT COMPLICATION: Primary | Chronic | ICD-10-CM

## 2024-03-18 RX ORDER — FLUTICASONE PROPIONATE AND SALMETEROL 250; 50 UG/1; UG/1
1 POWDER RESPIRATORY (INHALATION) 2 TIMES DAILY
Qty: 60 BLISTER | Refills: 11 | Status: SHIPPED | OUTPATIENT
Start: 2024-03-18

## 2024-06-01 ENCOUNTER — APPOINTMENT (OUTPATIENT)
Dept: LAB | Facility: MEDICAL CENTER | Age: 59
End: 2024-06-01
Payer: COMMERCIAL

## 2024-06-01 DIAGNOSIS — R73.03 PREDIABETES: ICD-10-CM

## 2024-06-01 DIAGNOSIS — E03.9 HYPOTHYROIDISM, UNSPECIFIED TYPE: Chronic | ICD-10-CM

## 2024-06-01 DIAGNOSIS — I10 BENIGN ESSENTIAL HTN: Chronic | ICD-10-CM

## 2024-06-01 DIAGNOSIS — Z11.59 ENCOUNTER FOR HEPATITIS C SCREENING TEST FOR LOW RISK PATIENT: ICD-10-CM

## 2024-06-01 LAB
ANION GAP SERPL CALCULATED.3IONS-SCNC: 7 MMOL/L (ref 4–13)
BUN SERPL-MCNC: 13 MG/DL (ref 5–25)
CALCIUM SERPL-MCNC: 9 MG/DL (ref 8.4–10.2)
CHLORIDE SERPL-SCNC: 100 MMOL/L (ref 96–108)
CHOLEST SERPL-MCNC: 146 MG/DL
CO2 SERPL-SCNC: 30 MMOL/L (ref 21–32)
CREAT SERPL-MCNC: 0.76 MG/DL (ref 0.6–1.3)
EST. AVERAGE GLUCOSE BLD GHB EST-MCNC: 134 MG/DL
GFR SERPL CREATININE-BSD FRML MDRD: 101 ML/MIN/1.73SQ M
GLUCOSE P FAST SERPL-MCNC: 100 MG/DL (ref 65–99)
HBA1C MFR BLD: 6.3 %
HDLC SERPL-MCNC: 40 MG/DL
LDLC SERPL CALC-MCNC: 86 MG/DL (ref 0–100)
NONHDLC SERPL-MCNC: 106 MG/DL
POTASSIUM SERPL-SCNC: 4.1 MMOL/L (ref 3.5–5.3)
SODIUM SERPL-SCNC: 137 MMOL/L (ref 135–147)
TRIGL SERPL-MCNC: 100 MG/DL
TSH SERPL DL<=0.05 MIU/L-ACNC: 4.22 UIU/ML (ref 0.45–4.5)
VIT B12 SERPL-MCNC: 80 PG/ML (ref 180–914)

## 2024-06-01 PROCEDURE — 80048 BASIC METABOLIC PNL TOTAL CA: CPT

## 2024-06-01 PROCEDURE — 84443 ASSAY THYROID STIM HORMONE: CPT

## 2024-06-01 PROCEDURE — 36415 COLL VENOUS BLD VENIPUNCTURE: CPT

## 2024-06-01 PROCEDURE — 83036 HEMOGLOBIN GLYCOSYLATED A1C: CPT

## 2024-06-01 PROCEDURE — 86803 HEPATITIS C AB TEST: CPT

## 2024-06-01 PROCEDURE — 82607 VITAMIN B-12: CPT

## 2024-06-01 PROCEDURE — 80061 LIPID PANEL: CPT

## 2024-06-02 LAB — HCV AB SER QL: NORMAL

## 2024-06-05 ENCOUNTER — RA CDI HCC (OUTPATIENT)
Dept: OTHER | Facility: HOSPITAL | Age: 59
End: 2024-06-05

## 2024-06-05 NOTE — PROGRESS NOTES
HCC coding opportunities          Chart Reviewed number of suggestions sent to Provider: 2   J45.909  E66.01    Patients Insurance        Commercial Insurance: [employer] Commercial Insurance

## 2024-06-12 ENCOUNTER — OFFICE VISIT (OUTPATIENT)
Dept: INTERNAL MEDICINE CLINIC | Age: 59
End: 2024-06-12
Payer: COMMERCIAL

## 2024-06-12 VITALS
HEIGHT: 71 IN | BODY MASS INDEX: 42.84 KG/M2 | TEMPERATURE: 98.8 F | SYSTOLIC BLOOD PRESSURE: 142 MMHG | WEIGHT: 306 LBS | DIASTOLIC BLOOD PRESSURE: 80 MMHG | OXYGEN SATURATION: 96 % | HEART RATE: 81 BPM

## 2024-06-12 DIAGNOSIS — R73.03 PREDIABETES: ICD-10-CM

## 2024-06-12 DIAGNOSIS — I10 BENIGN ESSENTIAL HTN: Primary | Chronic | ICD-10-CM

## 2024-06-12 DIAGNOSIS — J45.20 MILD INTERMITTENT ASTHMA WITHOUT COMPLICATION: Chronic | ICD-10-CM

## 2024-06-12 DIAGNOSIS — E03.9 HYPOTHYROIDISM, UNSPECIFIED TYPE: Chronic | ICD-10-CM

## 2024-06-12 DIAGNOSIS — E53.8 VITAMIN B12 DEFICIENCY: ICD-10-CM

## 2024-06-12 PROCEDURE — 99214 OFFICE O/P EST MOD 30 MIN: CPT | Performed by: INTERNAL MEDICINE

## 2024-06-12 PROCEDURE — 96372 THER/PROPH/DIAG INJ SC/IM: CPT

## 2024-06-12 RX ORDER — CYANOCOBALAMIN 1000 UG/ML
1000 INJECTION, SOLUTION INTRAMUSCULAR; SUBCUTANEOUS
Status: SHIPPED | OUTPATIENT
Start: 2024-06-12

## 2024-06-12 RX ADMIN — CYANOCOBALAMIN 1000 MCG: 1000 INJECTION, SOLUTION INTRAMUSCULAR; SUBCUTANEOUS at 15:56

## 2024-06-12 NOTE — PROGRESS NOTES
Assessment/Plan:     Diagnoses and all orders for this visit:    Benign essential HTN  Blood pressure could be better controlled systolic is 140  Hypothyroidism, unspecified type  TSH is normal  Prediabetes  Hemoglobin A1c 6.3  Mild intermittent asthma without complication  Asthma is very well-controlled  Vitamin B12 deficiency    Was complaining of fatigue with a history of hypothyroidism       M*Modal software was used to dictate this note.  It may contain errors with dictating incorrect words or incorrect spelling. Please contact the provider directly with any questions.    Subjective:   Chief Complaint   Patient presents with    Follow-up     4 month follow up   Labs 6/1         Patient ID: Haja Shen is a 59 y.o. male.    HPI  This is a very pleasant 59 years young gentleman who was seen before for the leg ulcer which is completely healed secondary to chronic venous insufficiency patient was followed up by the wound care and dermatology    Hypertension could be better controlled systolic blood pressure is slightly elevated at 142 but otherwise he is doing good no signs or symptoms of hypertension or complications renal functions are good only problem is he is high risk for sleep apnea I recommended him to get the sleep study he is not very anxious about it    Hypothyroidism TSH is normal no signs or symptoms.  Hypothyroidism patient's TSH is normal I did not adjust any dose of her levothyroxine she does not have any signs or symptoms of hypothyroidism we will follow-up her TSH in about 4 months and do the dose adjustment as needed.  No side effects of the medications    He was complaining of tiredness and fatigue during the daytime when he is done from his work keeping in mind that he has hyperthyroid followed up by the hypothyroid s/p radioactive thyroid treatment I checked his B12 levels which are significantly on the low side his B12 levels are only 80 will start him on the B12 injections and first load  him up with the injections and then we can change it to the p.o. and go from there    Asthma is very well-controlled some expiratory rhonchi on physical examination but he does not complain of any shortness of breath and the cough is better than before.  The following portions of the patient's history were reviewed and updated as appropriate: allergies, current medications, past family history, past medical history, past social history, past surgical history, and problem list.    Review of Systems   Constitutional:  Positive for fatigue. Negative for appetite change and fever.   HENT:  Negative for congestion, ear pain, hearing loss, nosebleeds, sneezing, tinnitus and voice change.    Eyes:  Negative for pain, discharge and redness.   Respiratory:  Positive for cough. Negative for chest tightness and wheezing.    Cardiovascular:  Positive for leg swelling. Negative for chest pain and palpitations.   Gastrointestinal:  Negative for abdominal pain, blood in stool, constipation, diarrhea, nausea and vomiting.   Genitourinary:  Negative for difficulty urinating, dysuria, hematuria and urgency.   Musculoskeletal:  Negative for arthralgias, back pain, gait problem and joint swelling.   Skin:  Negative for rash and wound.   Allergic/Immunologic: Negative for environmental allergies.   Neurological:  Negative for dizziness, tremors, seizures, weakness, light-headedness and numbness.   Hematological:  Negative for adenopathy. Does not bruise/bleed easily.   Psychiatric/Behavioral:  Negative for behavioral problems and confusion. The patient is not nervous/anxious.    All other systems reviewed and are negative.        Past Medical History:   Diagnosis Date    Asthma     Disease of thyroid gland     Hematoma of left axilla     Last Assessed:9/23/2015    Hypertension     Hyperthyroidism     Hypothyroidism     Toxic multinodular goiter     Last Assessed:12/26/2013         Current Outpatient Medications:     ammonium lactate  "(LAC-HYDRIN) 12 % lotion, Apply topically 2 (two) times a day as needed for dry skin, Disp: 400 g, Rfl: 2    atorvastatin (LIPITOR) 20 mg tablet, Take 1 tablet (20 mg total) by mouth daily, Disp: 90 tablet, Rfl: 1    clobetasol (TEMOVATE) 0.05 % ointment, , Disp: , Rfl:     Fluticasone-Salmeterol (Advair Diskus) 250-50 mcg/dose inhaler, Inhale 1 puff 2 (two) times a day Rinse mouth after use., Disp: 60 blister, Rfl: 11    levothyroxine 200 mcg tablet, Take 1 tablet (200 mcg total) by mouth daily, Disp: 90 tablet, Rfl: 1    levothyroxine 75 mcg tablet, Take 1 tablet (75 mcg total) by mouth daily, Disp: 90 tablet, Rfl: 1    losartan (COZAAR) 50 mg tablet, Take 1 tablet (50 mg total) by mouth daily, Disp: 90 tablet, Rfl: 1    metFORMIN (GLUCOPHAGE-XR) 500 mg 24 hr tablet, Take 1 tablet (500 mg total) by mouth daily with dinner, Disp: 90 tablet, Rfl: 1    mupirocin (BACTROBAN) 2 % ointment, Apply topically 2 (two) times a day, Disp: 30 g, Rfl: 3    predniSONE 20 mg tablet, , Disp: , Rfl:     tacrolimus (PROTOPIC) 0.1 % ointment, , Disp: , Rfl:     triamcinolone (KENALOG) 0.1 % ointment, Apply topically 2 (two) times a day for 14 days (Patient not taking: Reported on 6/12/2024), Disp: 30 g, Rfl: 2    Allergies   Allergen Reactions    Cat Hair Extract      Eye irritation       Social History   Past Surgical History:   Procedure Laterality Date    KNEE ARTHROSCOPY Left     THYROID SURGERY      Ablation     Family History   Problem Relation Age of Onset    Diabetes Mother         mellitus    Parkinsonism Father     Hypothyroidism Sister     Cancer Maternal Grandmother     Diabetes Maternal Aunt     Diabetes Maternal Uncle     Heart disease Neg Hx     Stroke Neg Hx        Objective:  /80 (BP Location: Left arm, Patient Position: Sitting, Cuff Size: Large)   Pulse 81   Temp 98.8 °F (37.1 °C) (Temporal)   Ht 5' 11\" (1.803 m)   Wt (!) 139 kg (306 lb)   SpO2 96%   BMI 42.68 kg/m²        Physical Exam  Constitutional: "       Appearance: He is well-developed. He is obese.   HENT:      Right Ear: Tympanic membrane and external ear normal.      Left Ear: Tympanic membrane normal.   Eyes:      Conjunctiva/sclera: Conjunctivae normal.      Pupils: Pupils are equal, round, and reactive to light.   Neck:      Thyroid: No thyromegaly.      Vascular: No JVD.   Cardiovascular:      Rate and Rhythm: Normal rate and regular rhythm.      Heart sounds: Normal heart sounds.   Pulmonary:      Breath sounds: Normal breath sounds.   Abdominal:      General: Bowel sounds are normal.      Palpations: Abdomen is soft.   Musculoskeletal:         General: Normal range of motion.      Cervical back: Normal range of motion.      Right lower leg: Edema present.      Left lower leg: Edema present.   Lymphadenopathy:      Cervical: No cervical adenopathy.   Skin:     General: Skin is dry.   Neurological:      General: No focal deficit present.      Mental Status: He is alert and oriented to person, place, and time. Mental status is at baseline.      Deep Tendon Reflexes: Reflexes are normal and symmetric.   Psychiatric:         Mood and Affect: Mood normal.         Behavior: Behavior normal.

## 2024-06-13 ENCOUNTER — CLINICAL SUPPORT (OUTPATIENT)
Dept: INTERNAL MEDICINE CLINIC | Age: 59
End: 2024-06-13
Payer: COMMERCIAL

## 2024-06-13 DIAGNOSIS — E53.8 VITAMIN B12 DEFICIENCY: Primary | ICD-10-CM

## 2024-06-13 PROCEDURE — 96372 THER/PROPH/DIAG INJ SC/IM: CPT

## 2024-06-13 RX ORDER — CYANOCOBALAMIN 1000 UG/ML
1000 INJECTION, SOLUTION INTRAMUSCULAR; SUBCUTANEOUS ONCE
Status: COMPLETED | OUTPATIENT
Start: 2024-06-13 | End: 2024-06-13

## 2024-06-13 RX ADMIN — CYANOCOBALAMIN 1000 MCG: 1000 INJECTION, SOLUTION INTRAMUSCULAR; SUBCUTANEOUS at 16:13

## 2024-06-14 ENCOUNTER — CLINICAL SUPPORT (OUTPATIENT)
Dept: INTERNAL MEDICINE CLINIC | Age: 59
End: 2024-06-14
Payer: COMMERCIAL

## 2024-06-14 DIAGNOSIS — E53.8 VITAMIN B12 DEFICIENCY: Primary | ICD-10-CM

## 2024-06-14 PROCEDURE — 96372 THER/PROPH/DIAG INJ SC/IM: CPT

## 2024-06-14 RX ADMIN — CYANOCOBALAMIN 1000 MCG: 1000 INJECTION, SOLUTION INTRAMUSCULAR; SUBCUTANEOUS at 16:23

## 2024-06-17 ENCOUNTER — CLINICAL SUPPORT (OUTPATIENT)
Dept: INTERNAL MEDICINE CLINIC | Age: 59
End: 2024-06-17
Payer: COMMERCIAL

## 2024-06-17 DIAGNOSIS — E53.8 B12 DEFICIENCY: Primary | ICD-10-CM

## 2024-06-17 PROCEDURE — 96372 THER/PROPH/DIAG INJ SC/IM: CPT

## 2024-06-17 RX ADMIN — CYANOCOBALAMIN 1000 MCG: 1000 INJECTION, SOLUTION INTRAMUSCULAR; SUBCUTANEOUS at 13:54

## 2024-06-18 ENCOUNTER — CLINICAL SUPPORT (OUTPATIENT)
Dept: INTERNAL MEDICINE CLINIC | Age: 59
End: 2024-06-18
Payer: COMMERCIAL

## 2024-06-18 DIAGNOSIS — E53.8 VITAMIN B12 DEFICIENCY: Primary | ICD-10-CM

## 2024-06-18 PROCEDURE — 96372 THER/PROPH/DIAG INJ SC/IM: CPT

## 2024-06-18 RX ADMIN — CYANOCOBALAMIN 1000 MCG: 1000 INJECTION, SOLUTION INTRAMUSCULAR; SUBCUTANEOUS at 16:56

## 2024-06-19 ENCOUNTER — CLINICAL SUPPORT (OUTPATIENT)
Dept: INTERNAL MEDICINE CLINIC | Age: 59
End: 2024-06-19
Payer: COMMERCIAL

## 2024-06-19 DIAGNOSIS — E53.8 VITAMIN B12 DEFICIENCY: Primary | ICD-10-CM

## 2024-06-19 PROCEDURE — 96372 THER/PROPH/DIAG INJ SC/IM: CPT

## 2024-06-19 RX ADMIN — CYANOCOBALAMIN 1000 MCG: 1000 INJECTION, SOLUTION INTRAMUSCULAR; SUBCUTANEOUS at 16:13

## 2024-06-21 ENCOUNTER — CLINICAL SUPPORT (OUTPATIENT)
Dept: INTERNAL MEDICINE CLINIC | Age: 59
End: 2024-06-21
Payer: COMMERCIAL

## 2024-06-21 DIAGNOSIS — E53.8 VITAMIN B12 DEFICIENCY: Primary | ICD-10-CM

## 2024-06-21 PROCEDURE — 96372 THER/PROPH/DIAG INJ SC/IM: CPT

## 2024-06-21 RX ADMIN — CYANOCOBALAMIN 1000 MCG: 1000 INJECTION, SOLUTION INTRAMUSCULAR; SUBCUTANEOUS at 16:23

## 2024-06-28 ENCOUNTER — VBI (OUTPATIENT)
Dept: ADMINISTRATIVE | Facility: OTHER | Age: 59
End: 2024-06-28

## 2024-06-28 NOTE — TELEPHONE ENCOUNTER
06/28/24 9:09 AM     Chart reviewed for CRC: Colonoscopy ; nothing is submitted to the patient's insurance at this time.     LAURA KAPLAN [facility]

## 2024-07-22 ENCOUNTER — CLINICAL SUPPORT (OUTPATIENT)
Dept: INTERNAL MEDICINE CLINIC | Age: 59
End: 2024-07-22
Payer: COMMERCIAL

## 2024-07-22 DIAGNOSIS — E53.8 VITAMIN B12 DEFICIENCY: Primary | ICD-10-CM

## 2024-07-22 PROCEDURE — 96372 THER/PROPH/DIAG INJ SC/IM: CPT

## 2024-07-22 RX ADMIN — CYANOCOBALAMIN 1000 MCG: 1000 INJECTION, SOLUTION INTRAMUSCULAR; SUBCUTANEOUS at 16:22

## 2024-08-25 NOTE — PROGRESS NOTES
Assessment/Plan:    1  Bilateral lower extremity edema  Echocardiogram reviewed  Ejection fraction is normal no diastolic dysfunction  Slight dilatation of ascending aorta up to 4 cm noted  Labs and BNP pending  Will restart patient on Demadex 20 mg b i d  For 2 weeks then once a day  K-Dur 20 mEq once a day  Also advised for compression stocking to use every day in the morning and take off at night  Also advised to lose some weight  2  Cellulitis bilateral lower extremity    Will start Keflex 500 mg p o  T i d  For 10 days  3  Hypothyroidism    Continue with present dose of levothyroxine  Advised to get thyroid function test before next visit  4  Essential hypertension  Will continue with the present dose of losartan    Follow-up  In 2-3 weeks with Dr Kavya Reyes       Diagnoses and all orders for this visit:    Hypothyroidism, unspecified type    Benign essential HTN    Obesity, Class III, BMI 40-49 9 (morbid obesity) ([facility] [address] )    Cellulitis of right leg  -     cephalexin (KEFLEX) 500 mg capsule; Take 1 capsule (500 mg total) by mouth every 8 (eight) hours for 10 days    Lower extremity edema  -     torsemide (DEMADEX) 20 mg tablet; Take 1 tablet (20 mg total) by mouth 2 (two) times a day  -     cephalexin (KEFLEX) 500 mg capsule; Take 1 capsule (500 mg total) by mouth every 8 (eight) hours for 10 days  -     potassium chloride (K-DUR,KLOR-CON) 20 mEq tablet; Take 1 tablet (20 mEq total) by mouth daily    Thoracic aortic aneurysm without rupture (HCC)               Subjective:          Patient ID: Halle Tenorio is a 50 y o  male  Came to office with a complain worsening swelling of lower extremities along with some skin breakdown and redness  He was seen for the same problem about 6 week ago and was started on Demadex for 1 month along with Keflex  Echocardiogram and blood work was requested but he never did blood work and echocardiogram was done last week    Denied any chest pain or shortness of breath  The following portions of the patient's history were reviewed and updated as appropriate: allergies, current medications, past family history, past medical history, past social history, past surgical history and problem list     Review of Systems   Constitutional: Negative for fatigue and fever  HENT: Negative for congestion, ear discharge, ear pain, postnasal drip, sinus pressure, sore throat, tinnitus and trouble swallowing  Eyes: Negative for discharge, itching and visual disturbance  Respiratory: Negative for cough and shortness of breath  Cardiovascular: Positive for leg swelling  Negative for chest pain and palpitations  Gastrointestinal: Negative for abdominal pain, diarrhea, nausea and vomiting  Endocrine: Negative for cold intolerance and polyuria  Genitourinary: Negative for difficulty urinating, dysuria and urgency  Musculoskeletal: Negative for arthralgias and neck pain  Skin: Negative for rash  Allergic/Immunologic: Negative for environmental allergies  Neurological: Negative for dizziness, weakness and headaches  Psychiatric/Behavioral: The patient is not nervous/anxious            Past Medical History:   Diagnosis Date    Asthma     Disease of thyroid gland     Hematoma of left axilla     Last Assessed:9/23/2015    Hypertension     Hyperthyroidism     Hypothyroidism     Toxic multinodular goiter     Last Assessed:12/26/2013         Current Outpatient Medications:     albuterol (PROAIR HFA) 90 mcg/act inhaler, Inhale 1-2 puffs, Disp: , Rfl:     atorvastatin (LIPITOR) 20 mg tablet, Take 1 tablet (20 mg total) by mouth daily, Disp: 90 tablet, Rfl: 1    Fluticasone Propionate, Inhal, (FLOVENT DISKUS) 250 MCG/BLIST AEPB, Inhale 1 puff daily, Disp: 60 each, Rfl: 3    levothyroxine 200 mcg tablet, Take 1 tablet (200 mcg total) by mouth daily, Disp: 90 tablet, Rfl: 1    levothyroxine 50 mcg tablet, Take 1 tablet (50 mcg total) by mouth daily, Disp: 90 tablet, Rfl: 1    losartan (COZAAR) 50 mg tablet, Take 1 tablet (50 mg total) by mouth daily, Disp: 90 tablet, Rfl: 1    cephalexin (KEFLEX) 500 mg capsule, Take 1 capsule (500 mg total) by mouth every 8 (eight) hours for 10 days, Disp: 30 capsule, Rfl: 0    potassium chloride (K-DUR,KLOR-CON) 20 mEq tablet, Take 1 tablet (20 mEq total) by mouth daily, Disp: 30 tablet, Rfl: 0    torsemide (DEMADEX) 20 mg tablet, Take 1 tablet (20 mg total) by mouth 2 (two) times a day, Disp: 30 tablet, Rfl: 0    Allergies   Allergen Reactions    Cat Hair Extract      Eye irritation       Social History   Past Surgical History:   Procedure Laterality Date    KNEE ARTHROSCOPY Left     THYROID SURGERY      Ablation     Family History   Problem Relation Age of Onset    Diabetes Mother         mellitus    Hypothyroidism Sister     Cancer Maternal Grandmother     Parkinsonism Father     Heart disease Neg Hx     Stroke Neg Hx        Objective:  /78 (BP Location: Left arm, Patient Position: Sitting, Cuff Size: Large)   Pulse 78   Temp 98 2 °F (36 8 °C) (Temporal)   Ht 5' 11 46" (1 815 m)   Wt (!) 140 kg (309 lb)   SpO2 94%   BMI 42 54 kg/m²   Body mass index is 42 54 kg/m²  Physical Exam  Constitutional:       Appearance: He is well-developed  HENT:      Head: Normocephalic  Right Ear: Ear canal and external ear normal       Left Ear: Ear canal and external ear normal    Eyes:      General: No scleral icterus  Pupils: Pupils are equal, round, and reactive to light  Neck:      Musculoskeletal: Normal range of motion and neck supple  Thyroid: No thyromegaly  Trachea: No tracheal deviation  Cardiovascular:      Rate and Rhythm: Normal rate and regular rhythm  Heart sounds: Normal heart sounds  Comments: 4+ bilateral lower extremity edema with some blistering and skin breakdown noted  Also mild hyperemia on both lower extremity noted    Pulmonary:      Effort: Pulmonary effort is 0 normal  No respiratory distress  Breath sounds: Normal breath sounds  Chest:      Chest wall: No tenderness  Abdominal:      General: Bowel sounds are normal       Palpations: Abdomen is soft  There is no mass  Tenderness: There is no abdominal tenderness  Musculoskeletal: Normal range of motion  Right lower leg: Edema present  Left lower leg: Edema present  Lymphadenopathy:      Cervical: No cervical adenopathy  Skin:     General: Skin is warm  Findings: Erythema present  Neurological:      Mental Status: He is alert  Cranial Nerves: No cranial nerve deficit     Psychiatric:         Mood and Affect: Mood normal          Behavior: Behavior normal

## 2024-08-26 ENCOUNTER — CLINICAL SUPPORT (OUTPATIENT)
Dept: INTERNAL MEDICINE CLINIC | Age: 59
End: 2024-08-26
Payer: COMMERCIAL

## 2024-08-26 DIAGNOSIS — E53.8 VITAMIN B12 DEFICIENCY: Primary | ICD-10-CM

## 2024-08-26 PROCEDURE — 96372 THER/PROPH/DIAG INJ SC/IM: CPT

## 2024-08-26 RX ADMIN — CYANOCOBALAMIN 1000 MCG: 1000 INJECTION, SOLUTION INTRAMUSCULAR; SUBCUTANEOUS at 15:53

## 2024-09-09 DIAGNOSIS — E03.9 HYPOTHYROIDISM, UNSPECIFIED TYPE: ICD-10-CM

## 2024-09-09 DIAGNOSIS — E78.00 HYPERCHOLESTEROLEMIA: ICD-10-CM

## 2024-09-09 DIAGNOSIS — R73.03 PREDIABETES: ICD-10-CM

## 2024-09-09 DIAGNOSIS — I10 ESSENTIAL HYPERTENSION, BENIGN: ICD-10-CM

## 2024-09-09 RX ORDER — LEVOTHYROXINE SODIUM 75 UG/1
75 TABLET ORAL DAILY
Qty: 90 TABLET | Refills: 1 | Status: SHIPPED | OUTPATIENT
Start: 2024-09-09

## 2024-09-09 RX ORDER — LEVOTHYROXINE SODIUM 200 UG/1
200 TABLET ORAL DAILY
Qty: 90 TABLET | Refills: 1 | Status: SHIPPED | OUTPATIENT
Start: 2024-09-09

## 2024-09-09 RX ORDER — ATORVASTATIN CALCIUM 20 MG/1
20 TABLET, FILM COATED ORAL DAILY
Qty: 90 TABLET | Refills: 1 | Status: SHIPPED | OUTPATIENT
Start: 2024-09-09

## 2024-09-09 RX ORDER — LOSARTAN POTASSIUM 50 MG/1
50 TABLET ORAL DAILY
Qty: 90 TABLET | Refills: 1 | Status: SHIPPED | OUTPATIENT
Start: 2024-09-09

## 2024-09-09 RX ORDER — METFORMIN HCL 500 MG
500 TABLET, EXTENDED RELEASE 24 HR ORAL
Qty: 90 TABLET | Refills: 1 | Status: SHIPPED | OUTPATIENT
Start: 2024-09-09

## 2024-09-17 ENCOUNTER — RA CDI HCC (OUTPATIENT)
Dept: OTHER | Facility: HOSPITAL | Age: 59
End: 2024-09-17

## 2024-09-17 NOTE — PROGRESS NOTES
E66.01  HCC coding opportunities          Chart Reviewed number of suggestions sent to Provider: 1     Patients Insurance        Commercial Insurance: [employer] Commercial [employer]

## 2024-09-21 ENCOUNTER — APPOINTMENT (OUTPATIENT)
Dept: LAB | Facility: CLINIC | Age: 59
End: 2024-09-21
Payer: COMMERCIAL

## 2024-09-21 DIAGNOSIS — R73.03 PREDIABETES: ICD-10-CM

## 2024-09-21 DIAGNOSIS — E03.9 HYPOTHYROIDISM, UNSPECIFIED TYPE: Chronic | ICD-10-CM

## 2024-09-21 DIAGNOSIS — E53.8 VITAMIN B12 DEFICIENCY: ICD-10-CM

## 2024-09-21 LAB
BASOPHILS # BLD AUTO: 0.06 THOUSANDS/ΜL (ref 0–0.1)
BASOPHILS NFR BLD AUTO: 1 % (ref 0–1)
EOSINOPHIL # BLD AUTO: 0.26 THOUSAND/ΜL (ref 0–0.61)
EOSINOPHIL NFR BLD AUTO: 4 % (ref 0–6)
ERYTHROCYTE [DISTWIDTH] IN BLOOD BY AUTOMATED COUNT: 15.7 % (ref 11.6–15.1)
EST. AVERAGE GLUCOSE BLD GHB EST-MCNC: 126 MG/DL
GLUCOSE P FAST SERPL-MCNC: 93 MG/DL (ref 65–99)
HBA1C MFR BLD: 6 %
HCT VFR BLD AUTO: 44.2 % (ref 36.5–49.3)
HGB BLD-MCNC: 13.5 G/DL (ref 12–17)
IMM GRANULOCYTES # BLD AUTO: 0.03 THOUSAND/UL (ref 0–0.2)
IMM GRANULOCYTES NFR BLD AUTO: 0 % (ref 0–2)
LYMPHOCYTES # BLD AUTO: 1.55 THOUSANDS/ΜL (ref 0.6–4.47)
LYMPHOCYTES NFR BLD AUTO: 21 % (ref 14–44)
MCH RBC QN AUTO: 26.7 PG (ref 26.8–34.3)
MCHC RBC AUTO-ENTMCNC: 30.5 G/DL (ref 31.4–37.4)
MCV RBC AUTO: 87 FL (ref 82–98)
MONOCYTES # BLD AUTO: 0.74 THOUSAND/ΜL (ref 0.17–1.22)
MONOCYTES NFR BLD AUTO: 10 % (ref 4–12)
NEUTROPHILS # BLD AUTO: 4.82 THOUSANDS/ΜL (ref 1.85–7.62)
NEUTS SEG NFR BLD AUTO: 64 % (ref 43–75)
NRBC BLD AUTO-RTO: 0 /100 WBCS
PLATELET # BLD AUTO: 189 THOUSANDS/UL (ref 149–390)
PMV BLD AUTO: 10.2 FL (ref 8.9–12.7)
RBC # BLD AUTO: 5.06 MILLION/UL (ref 3.88–5.62)
TSH SERPL DL<=0.05 MIU/L-ACNC: 1.42 UIU/ML (ref 0.45–4.5)
WBC # BLD AUTO: 7.46 THOUSAND/UL (ref 4.31–10.16)

## 2024-09-21 PROCEDURE — 83036 HEMOGLOBIN GLYCOSYLATED A1C: CPT

## 2024-09-21 PROCEDURE — 82947 ASSAY GLUCOSE BLOOD QUANT: CPT

## 2024-09-21 PROCEDURE — 85025 COMPLETE CBC W/AUTO DIFF WBC: CPT

## 2024-09-21 PROCEDURE — 36415 COLL VENOUS BLD VENIPUNCTURE: CPT

## 2024-09-21 PROCEDURE — 84443 ASSAY THYROID STIM HORMONE: CPT

## 2024-09-24 ENCOUNTER — OFFICE VISIT (OUTPATIENT)
Dept: INTERNAL MEDICINE CLINIC | Age: 59
End: 2024-09-24
Payer: COMMERCIAL

## 2024-09-24 VITALS
WEIGHT: 298 LBS | BODY MASS INDEX: 41.72 KG/M2 | HEART RATE: 65 BPM | DIASTOLIC BLOOD PRESSURE: 80 MMHG | SYSTOLIC BLOOD PRESSURE: 140 MMHG | TEMPERATURE: 97.4 F | OXYGEN SATURATION: 98 % | HEIGHT: 71 IN

## 2024-09-24 DIAGNOSIS — E03.9 HYPOTHYROIDISM, UNSPECIFIED TYPE: Chronic | ICD-10-CM

## 2024-09-24 DIAGNOSIS — I10 ESSENTIAL HYPERTENSION, BENIGN: ICD-10-CM

## 2024-09-24 DIAGNOSIS — J45.20 MILD INTERMITTENT ASTHMA WITHOUT COMPLICATION: Chronic | ICD-10-CM

## 2024-09-24 DIAGNOSIS — E53.8 VITAMIN B12 DEFICIENCY: Primary | ICD-10-CM

## 2024-09-24 DIAGNOSIS — I10 BENIGN ESSENTIAL HTN: Chronic | ICD-10-CM

## 2024-09-24 DIAGNOSIS — R73.03 PREDIABETES: ICD-10-CM

## 2024-09-24 PROCEDURE — 96372 THER/PROPH/DIAG INJ SC/IM: CPT

## 2024-09-24 PROCEDURE — 99214 OFFICE O/P EST MOD 30 MIN: CPT | Performed by: INTERNAL MEDICINE

## 2024-09-24 RX ORDER — LOSARTAN POTASSIUM 100 MG/1
100 TABLET ORAL DAILY
Qty: 90 TABLET | Refills: 3 | Status: SHIPPED | OUTPATIENT
Start: 2024-09-24

## 2024-09-24 RX ADMIN — CYANOCOBALAMIN 1000 MCG: 1000 INJECTION, SOLUTION INTRAMUSCULAR; SUBCUTANEOUS at 16:29

## 2024-09-24 NOTE — PROGRESS NOTES
Ambulatory Visit  Name: Haja Shen      : 1965      MRN: [id number]  Encounter Provider: Idalia Peñaloza MD  Encounter Date: 2024   Encounter department: [facility]    Assessment & Plan  Vitamin B12 deficiency    Orders:    Vitamin B12; Future    Prediabetes  Hemoglobin A1c is a 6.0 which is better than before and also he is on metformin       Benign essential HTN  Hypertension could be better controlled I will increase his losartan from        Hypothyroidism, unspecified type  TSH is within normal range       Mild intermittent asthma without complication  Asthma is very well-controlled       Essential hypertension, benign    Orders:    losartan (COZAAR) 100 MG tablet; Take 1 tablet (100 mg total) by mouth daily       History of Present Illness     This is a very pleasant 59 years young gentleman who is here today for the regular follow-up he is doing well no new complaints review of system is essentially unremarkable leg ulcer healed very well he was seen by the dermatologist    Hypertension could be better controlled systolic blood pressure is 140 and diastolic is within normal range I will increase his losartan from  that will help him for his prediabetes and decrease the incidence of renal insufficiency or albuminuria    Prediabetes hemoglobin A1c is 6.0 he is on metformin plan is to continue with the present medication and no change he did lose about 6 pounds since the last office visit    Hypothyroidism hypothyroidism patient's TSH is normal I did not adjust any dose of her levothyroxine she does not have any signs or symptoms of hypothyroidism we will follow-up her TSH in about 4 months and do the dose adjustment as needed.  No side effects of the medications  TSH is normal no symptoms of hypothyroidism    B12 deficiency his last B12 levels were 80 he is on B12 injections right now we will give him 1 injection and also I will order his B12 levels he  "can get the B12 levels at any time and then we will see him back in about 4 months unless any problem and we will give him the paper for the blood test for his thyroid before his next appointment          Review of Systems   Constitutional:  Negative for appetite change, fatigue and fever.   HENT:  Negative for congestion, ear pain, hearing loss, nosebleeds, sneezing, tinnitus and voice change.    Eyes:  Negative for pain, discharge and redness.   Respiratory:  Negative for cough, chest tightness and wheezing.    Cardiovascular:  Negative for chest pain, palpitations and leg swelling.   Gastrointestinal:  Negative for abdominal pain, blood in stool, constipation, diarrhea, nausea and vomiting.   Genitourinary:  Negative for difficulty urinating, dysuria, hematuria and urgency.   Musculoskeletal:  Negative for arthralgias, back pain, gait problem and joint swelling.   Skin:  Negative for rash and wound.   Allergic/Immunologic: Negative for environmental allergies.   Neurological:  Negative for dizziness, tremors, seizures, weakness, light-headedness and numbness.   Hematological:  Negative for adenopathy. Does not bruise/bleed easily.   Psychiatric/Behavioral:  Negative for behavioral problems and confusion. The patient is not nervous/anxious.            Objective     /80 (BP Location: Left arm, Patient Position: Sitting, Cuff Size: Large)   Pulse 65   Temp (!) 97.4 °F (36.3 °C) (Temporal)   Ht 5' 11\" (1.803 m)   Wt 135 kg (298 lb)   SpO2 98%   BMI 41.56 kg/m²     Physical Exam  Vitals and nursing note reviewed.   Constitutional:       Appearance: Normal appearance. He is normal weight.   HENT:      Head: Normocephalic and atraumatic.      Right Ear: Tympanic membrane normal.      Left Ear: Tympanic membrane normal.      Nose: Nose normal.      Mouth/Throat:      Mouth: Mucous membranes are moist.   Eyes:      Extraocular Movements: Extraocular movements intact.      Pupils: Pupils are equal, round, and " reactive to light.   Cardiovascular:      Rate and Rhythm: Normal rate and regular rhythm.      Pulses: Normal pulses.      Heart sounds: Normal heart sounds.   Pulmonary:      Effort: Pulmonary effort is normal.      Breath sounds: Normal breath sounds.   Abdominal:      General: Abdomen is flat. Bowel sounds are normal.      Palpations: Abdomen is soft.   Musculoskeletal:         General: No swelling, tenderness or deformity. Normal range of motion.      Cervical back: Normal range of motion and neck supple.   Skin:     General: Skin is warm.      Capillary Refill: Capillary refill takes 2 to 3 seconds.   Neurological:      General: No focal deficit present.      Mental Status: He is alert and oriented to person, place, and time. Mental status is at baseline.   Psychiatric:         Mood and Affect: Mood normal.         Behavior: Behavior normal.

## 2024-10-23 ENCOUNTER — CLINICAL SUPPORT (OUTPATIENT)
Dept: INTERNAL MEDICINE CLINIC | Age: 59
End: 2024-10-23
Payer: COMMERCIAL

## 2024-10-23 DIAGNOSIS — E53.8 VITAMIN B12 DEFICIENCY: Primary | ICD-10-CM

## 2024-10-23 PROCEDURE — 96372 THER/PROPH/DIAG INJ SC/IM: CPT

## 2024-10-23 RX ADMIN — CYANOCOBALAMIN 1000 MCG: 1000 INJECTION, SOLUTION INTRAMUSCULAR; SUBCUTANEOUS at 16:07

## 2024-11-10 ENCOUNTER — VBI (OUTPATIENT)
Dept: ADMINISTRATIVE | Facility: OTHER | Age: 59
End: 2024-11-10

## 2024-11-10 NOTE — TELEPHONE ENCOUNTER
11/10/24 6:20 PM     Chart reviewed for Blood Pressure ; nothing is submitted to the patient's insurance at this time.     Maren Huston MA   [facility]

## 2024-12-02 ENCOUNTER — CLINICAL SUPPORT (OUTPATIENT)
Dept: INTERNAL MEDICINE CLINIC | Age: 59
End: 2024-12-02
Payer: COMMERCIAL

## 2024-12-02 DIAGNOSIS — E53.8 B12 DEFICIENCY: Primary | ICD-10-CM

## 2024-12-02 PROCEDURE — 96372 THER/PROPH/DIAG INJ SC/IM: CPT

## 2024-12-02 RX ADMIN — CYANOCOBALAMIN 1000 MCG: 1000 INJECTION, SOLUTION INTRAMUSCULAR; SUBCUTANEOUS at 16:26

## 2025-01-24 ENCOUNTER — RA CDI HCC (OUTPATIENT)
Dept: OTHER | Facility: HOSPITAL | Age: 60
End: 2025-01-24

## 2025-01-24 NOTE — PROGRESS NOTES
HCC coding opportunities          Chart Reviewed number of suggestions sent to Provider: 1   E66.01    This is a reminder to address (resolve/update/assess) ALL HCC (risk adjustment) codes as found on active problem list for 2025 as patient scores reset to zero MARILEE.  Patients Insurance        Commercial Insurance: [employer] Commercial [employer]

## 2025-01-25 ENCOUNTER — APPOINTMENT (OUTPATIENT)
Dept: LAB | Facility: CLINIC | Age: 60
End: 2025-01-25
Payer: COMMERCIAL

## 2025-01-25 DIAGNOSIS — E53.8 VITAMIN B12 DEFICIENCY: ICD-10-CM

## 2025-01-25 DIAGNOSIS — R73.03 PREDIABETES: ICD-10-CM

## 2025-01-25 DIAGNOSIS — I10 BENIGN ESSENTIAL HTN: Chronic | ICD-10-CM

## 2025-01-25 DIAGNOSIS — E03.9 HYPOTHYROIDISM, UNSPECIFIED TYPE: Chronic | ICD-10-CM

## 2025-01-25 LAB
ANION GAP SERPL CALCULATED.3IONS-SCNC: 6 MMOL/L (ref 4–13)
BUN SERPL-MCNC: 14 MG/DL (ref 5–25)
CALCIUM SERPL-MCNC: 9 MG/DL (ref 8.4–10.2)
CHLORIDE SERPL-SCNC: 103 MMOL/L (ref 96–108)
CO2 SERPL-SCNC: 34 MMOL/L (ref 21–32)
CREAT SERPL-MCNC: 0.74 MG/DL (ref 0.6–1.3)
EST. AVERAGE GLUCOSE BLD GHB EST-MCNC: 128 MG/DL
GFR SERPL CREATININE-BSD FRML MDRD: 102 ML/MIN/1.73SQ M
GLUCOSE P FAST SERPL-MCNC: 106 MG/DL (ref 65–99)
HBA1C MFR BLD: 6.1 %
POTASSIUM SERPL-SCNC: 4.5 MMOL/L (ref 3.5–5.3)
SODIUM SERPL-SCNC: 143 MMOL/L (ref 135–147)
TSH SERPL DL<=0.05 MIU/L-ACNC: 2.6 UIU/ML (ref 0.45–4.5)
VIT B12 SERPL-MCNC: 161 PG/ML (ref 180–914)

## 2025-01-25 PROCEDURE — 84443 ASSAY THYROID STIM HORMONE: CPT

## 2025-01-25 PROCEDURE — 83036 HEMOGLOBIN GLYCOSYLATED A1C: CPT

## 2025-01-25 PROCEDURE — 80048 BASIC METABOLIC PNL TOTAL CA: CPT

## 2025-01-25 PROCEDURE — 82607 VITAMIN B-12: CPT

## 2025-01-25 PROCEDURE — 36415 COLL VENOUS BLD VENIPUNCTURE: CPT

## 2025-01-27 ENCOUNTER — OFFICE VISIT (OUTPATIENT)
Dept: INTERNAL MEDICINE CLINIC | Age: 60
End: 2025-01-27
Payer: COMMERCIAL

## 2025-01-27 VITALS
HEIGHT: 71 IN | OXYGEN SATURATION: 97 % | DIASTOLIC BLOOD PRESSURE: 76 MMHG | SYSTOLIC BLOOD PRESSURE: 138 MMHG | HEART RATE: 56 BPM | WEIGHT: 305 LBS | TEMPERATURE: 97.7 F | BODY MASS INDEX: 42.7 KG/M2

## 2025-01-27 DIAGNOSIS — R73.03 PREDIABETES: ICD-10-CM

## 2025-01-27 DIAGNOSIS — J45.20 MILD INTERMITTENT ASTHMA WITHOUT COMPLICATION: ICD-10-CM

## 2025-01-27 DIAGNOSIS — E66.01 OBESITY, CLASS III, BMI 40-49.9 (MORBID OBESITY) (HCC): Primary | ICD-10-CM

## 2025-01-27 DIAGNOSIS — E78.2 MIXED HYPERLIPIDEMIA: Chronic | ICD-10-CM

## 2025-01-27 DIAGNOSIS — E03.9 HYPOTHYROIDISM, UNSPECIFIED TYPE: Chronic | ICD-10-CM

## 2025-01-27 DIAGNOSIS — E53.8 VITAMIN B12 DEFICIENCY: ICD-10-CM

## 2025-01-27 PROCEDURE — 99214 OFFICE O/P EST MOD 30 MIN: CPT | Performed by: INTERNAL MEDICINE

## 2025-01-27 PROCEDURE — 96372 THER/PROPH/DIAG INJ SC/IM: CPT | Performed by: INTERNAL MEDICINE

## 2025-01-27 RX ADMIN — CYANOCOBALAMIN 1000 MCG: 1000 INJECTION, SOLUTION INTRAMUSCULAR; SUBCUTANEOUS at 15:10

## 2025-01-27 NOTE — ASSESSMENT & PLAN NOTE
Diabetes with the hemoglobin A1c slightly better than the last time but is still high he can benefit from GLP-1 to reduce his weight in the same token it will help him with the diabetes control or prevention  Orders:    semaglutide, 0.25 or 0.5 mg/dose, (Ozempic, 0.25 or 0.5 MG/DOSE,) 2 mg/3 mL injection pen; 0.25 mg under the skin every 7 days for 4 doses (28 days), THEN 0.5 mg under the skin every 7 days

## 2025-01-27 NOTE — ASSESSMENT & PLAN NOTE
Patient's vitamin B12 is still on the low side although he is getting vitamin B12 injection every month but it is better than before almost double

## 2025-01-27 NOTE — PROGRESS NOTES
Name: Haja Shen      : 1965      MRN: [id number]  Encounter Provider: Idalia Peñaloza MD  Encounter Date: 2025   Encounter department: [facility]  :  Assessment & Plan  Vitamin B12 deficiency  Patient's vitamin B12 is still on the low side although he is getting vitamin B12 injection every month but it is better than before almost double       Prediabetes  Diabetes with the hemoglobin A1c slightly better than the last time but is still high he can benefit from GLP-1 to reduce his weight in the same token it will help him with the diabetes control or prevention  Orders:    semaglutide, 0.25 or 0.5 mg/dose, (Ozempic, 0.25 or 0.5 MG/DOSE,) 2 mg/3 mL injection pen; 0.25 mg under the skin every 7 days for 4 doses (28 days), THEN 0.5 mg under the skin every 7 days    Mixed hyperlipidemia  Continue with the hyperlipidemia follow-up       Obesity, Class III, BMI 40-49.9 (morbid obesity) ([facility])      Orders:    semaglutide, 0.25 or 0.5 mg/dose, (Ozempic, 0.25 or 0.5 MG/DOSE,) 2 mg/3 mL injection pen; 0.25 mg under the skin every 7 days for 4 doses (28 days), THEN 0.5 mg under the skin every 7 days  Will start semaglutide and see how he does with that  Hypothyroidism, unspecified type  TSH is normal       Mild intermittent asthma without complication  Asthma is very well-controlled              History of Present Illness   This is a very pleasant 59 years young gentleman who is here today for the regular follow-up he has a BMI about 40 and he is not able to lose any weight he is prediabetic with hemoglobin A1c 6.1 I highly recommend him to start on GLP-1 agonist which will help him with the weight loss and also most likely will help him to prevent the diabetes he is working on his weight but unfortunately he is not able to lose much    Prediabetes as given above hemoglobin A1c is stable    Patient is here today for the follow-up.   Hypertension. I reviewed antihypertensive  "medication, patient does not have any side effects of  medications, no signs or symptoms of hypertension ,hypotension or orthostatic hypotension.  Patient is compliant with medications.  Blood workup related to hypertensive diagnosis reviewed.  Plan is to continue with the present management.  We will follow-up as a scheduled and adjust the doses of the medication as indicated.    Hypothyroidism patient's TSH is normal I did not adjust any dose of her levothyroxine she does not have any signs or symptoms of hypothyroidism we will follow-up her TSH in about 4 months and do the dose adjustment as needed.  No side effects of the medications  TSH is normal asymptomatic except for the severe dryness of the skin    B12 deficiency he is on vitamin B12 injections but is still his vitamin B12 levels are on the low side but improved almost doubled      Review of Systems   Constitutional:  Negative for activity change, chills, fatigue and fever.   HENT:  Negative for congestion, ear pain, sinus pain and sore throat.    Eyes:  Negative for pain, discharge and visual disturbance.   Respiratory:  Negative for cough and shortness of breath.    Cardiovascular:  Negative for chest pain and palpitations.   Gastrointestinal:  Negative for abdominal pain, constipation, diarrhea, nausea and vomiting.   Genitourinary:  Negative for dysuria, hematuria and urgency.   Musculoskeletal:  Negative for arthralgias, back pain and gait problem.   Skin:  Negative for color change and rash.   Neurological:  Negative for dizziness, seizures, syncope, weakness and light-headedness.   Psychiatric/Behavioral:  Negative for sleep disturbance. The patient is not nervous/anxious.    All other systems reviewed and are negative.      Objective   /76 (BP Location: Left arm, Patient Position: Sitting, Cuff Size: Large)   Pulse 56   Temp 97.7 °F (36.5 °C) (Temporal)   Ht 5' 11\" (1.803 m)   Wt (!) 138 kg (305 lb)   SpO2 97%   BMI 42.54 kg/m²    "   Physical Exam  Vitals and nursing note reviewed.   Constitutional:       General: He is not in acute distress.     Appearance: He is well-developed. He is obese.   HENT:      Head: Normocephalic and atraumatic.   Eyes:      Conjunctiva/sclera: Conjunctivae normal.   Cardiovascular:      Rate and Rhythm: Normal rate and regular rhythm.      Heart sounds: No murmur heard.  Pulmonary:      Effort: Pulmonary effort is normal. No respiratory distress.      Breath sounds: Normal breath sounds.   Abdominal:      Palpations: Abdomen is soft.      Tenderness: There is no abdominal tenderness.   Musculoskeletal:         General: No swelling.      Cervical back: Neck supple.   Skin:     General: Skin is warm and dry.      Capillary Refill: Capillary refill takes less than 2 seconds.      Comments: Very dry skin especially his hands he was supposed to use the cream but he does not use it regularly he use it for few days and then he stops when it is better also I recommended him highly to use the humidifier for dry skin   Neurological:      Mental Status: He is alert.   Psychiatric:         Mood and Affect: Mood normal.

## 2025-01-27 NOTE — ASSESSMENT & PLAN NOTE
Orders:    semaglutide, 0.25 or 0.5 mg/dose, (Ozempic, 0.25 or 0.5 MG/DOSE,) 2 mg/3 mL injection pen; 0.25 mg under the skin every 7 days for 4 doses (28 days), THEN 0.5 mg under the skin every 7 days  Will start semaglutide and see how he does with that

## 2025-01-28 ENCOUNTER — TELEPHONE (OUTPATIENT)
Dept: INTERNAL MEDICINE CLINIC | Facility: OTHER | Age: 60
End: 2025-01-28

## 2025-01-28 ENCOUNTER — TELEPHONE (OUTPATIENT)
Age: 60
End: 2025-01-28

## 2025-01-28 DIAGNOSIS — R73.03 PREDIABETES: ICD-10-CM

## 2025-01-28 DIAGNOSIS — E66.01 OBESITY, CLASS III, BMI 40-49.9 (MORBID OBESITY) (HCC): Primary | ICD-10-CM

## 2025-01-28 RX ORDER — SEMAGLUTIDE 0.5 MG/.5ML
INJECTION, SOLUTION SUBCUTANEOUS
Qty: 2 ML | Refills: 0 | Status: SHIPPED | OUTPATIENT
Start: 2025-01-28

## 2025-01-28 NOTE — TELEPHONE ENCOUNTER
Pharmacy called requesting for  Prior Authorization for following medication is needed    Semaglutide-Weight Management (Wegovy) 0.5 MG/0.5ML [[id number]]    Order Details  Dose, Route, Frequency: As Directed   Dispense Quantity: 2 mL Refills: 0          Sig: Inject 0.5 mg under the skin weekly         Start Date: 01/28/25 End Date: --   Written Date: 01/28/25 Expiration Date: 01/28/26

## 2025-01-28 NOTE — TELEPHONE ENCOUNTER
Received medication prior authorization request from [employer] for the following medication:    semaglutide, 0.25 or 0.5 mg/dose, (Ozempic, 0.25 or 0.5 MG/DOSE,) 2 mg/3 mL injection pen [[id number]]    Order Details  Dose, Route, Frequency: As Directed   Dispense Quantity: 9 mL Refills: 0          Si.25 mg under the skin every 7 days for 4 doses (28 days), THEN 0.5 mg under the skin every 7 days         Start Date: 25 End Date: --   Written Date: 25 Expiration Date: 26       Associated Diagnoses: Prediabetes [R73.03]; Obesity, Class III, BMI 40-49.9 (morbid obesity) ([facility]) [E66.01]     Form has been scanned into patients chart.

## 2025-01-28 NOTE — TELEPHONE ENCOUNTER
Ozempic will not be covered with or without a prior authorization, Ozempic is not FDA approved for obesity, prediabetes, etc. Ozempic is only FDA Approved for Type 2 Diabetes and will only be Approved via a Prior Authorization if patient has a diagnosis of Type 2 Diabetes.

## 2025-01-28 NOTE — TELEPHONE ENCOUNTER
PA for Wegovy 0.5mg SUBMITTED to [facility]    via    []CMM-KEY:  [x]Surescripts-Case ID #   []Availity-Auth ID #NDC #   []Faxed to plan   []Other website   []Phone call Case ID #     [x]PA sent as URGENT    All office notes, labs and other pertaining documents and studies sent. Clinical questions answered. Awaiting determination from insurance company.     Turnaround time for your insurance to make a decision on your Prior Authorization can take 7-21 business days.

## 2025-01-29 ENCOUNTER — TELEPHONE (OUTPATIENT)
Age: 60
End: 2025-01-29

## 2025-01-29 NOTE — TELEPHONE ENCOUNTER
PA for Wegovy 0.5mg EXCLUDED    Reason:        Message sent to office clinical pool Yes    Denial letter scanned into Media Yes      **Please follow up with your patient regarding denial and next steps**

## 2025-01-29 NOTE — TELEPHONE ENCOUNTER
Wegovy has been denied not covered under pharmacy benefits. Spoke with patient he will call insurance to se if alternative medication is covered

## 2025-02-05 NOTE — TELEPHONE ENCOUNTER
Patient is calling to inform provider that he just got off the phone with his insurance company and they will not cover any weight loss medication    Please review and advise

## 2025-02-19 ENCOUNTER — CLINICAL SUPPORT (OUTPATIENT)
Dept: INTERNAL MEDICINE CLINIC | Age: 60
End: 2025-02-19
Payer: COMMERCIAL

## 2025-02-19 DIAGNOSIS — E53.8 VITAMIN B12 DEFICIENCY: Primary | ICD-10-CM

## 2025-02-19 PROCEDURE — 96372 THER/PROPH/DIAG INJ SC/IM: CPT

## 2025-02-19 RX ADMIN — CYANOCOBALAMIN 1000 MCG: 1000 INJECTION, SOLUTION INTRAMUSCULAR; SUBCUTANEOUS at 08:57

## 2025-02-21 ENCOUNTER — RA CDI HCC (OUTPATIENT)
Dept: OTHER | Facility: HOSPITAL | Age: 60
End: 2025-02-21

## 2025-02-21 NOTE — PROGRESS NOTES
HCC coding opportunities       Chart reviewed, no opportunity found: CHART REVIEWED, NO OPPORTUNITY FOUND      This is a reminder to address (resolve/update/assess) ALL HCC (risk adjustment) codes as found on active problem list for 2025 as patient scores reset to zero MARILEE.  Patients Insurance        Commercial Insurance: [employer]

## 2025-03-02 ENCOUNTER — OFFICE VISIT (OUTPATIENT)
Dept: URGENT CARE | Facility: CLINIC | Age: 60
End: 2025-03-02
Payer: COMMERCIAL

## 2025-03-02 VITALS
TEMPERATURE: 97.7 F | BODY MASS INDEX: 41.91 KG/M2 | DIASTOLIC BLOOD PRESSURE: 80 MMHG | SYSTOLIC BLOOD PRESSURE: 140 MMHG | HEART RATE: 105 BPM | OXYGEN SATURATION: 94 % | RESPIRATION RATE: 20 BRPM | WEIGHT: 300.5 LBS

## 2025-03-02 DIAGNOSIS — L03.115 CELLULITIS OF RIGHT LOWER EXTREMITY: Primary | ICD-10-CM

## 2025-03-02 PROCEDURE — G0382 LEV 3 HOSP TYPE B ED VISIT: HCPCS

## 2025-03-02 PROCEDURE — S9083 URGENT CARE CENTER GLOBAL: HCPCS

## 2025-03-02 RX ORDER — CEPHALEXIN 500 MG/1
500 CAPSULE ORAL EVERY 6 HOURS SCHEDULED
Qty: 28 CAPSULE | Refills: 0 | Status: SHIPPED | OUTPATIENT
Start: 2025-03-02 | End: 2025-03-09

## 2025-03-02 NOTE — PATIENT INSTRUCTIONS
Rest and elevation.  Start Cephalexin  Tylenol and Motrin OTC for pain  Monitor for signs of worsening infection such as increased pain, redness, swelling, fever, or no improvement in 2-3 days    Follow up with Primary Care Physician.   Return to clinic or go to the nearest Emergency Department with new or worsening symptoms as discussed.     Cellulitis   WHAT YOU NEED TO KNOW:   Cellulitis is a skin infection caused by bacteria. Cellulitis is common and can become severe. Cellulitis usually appears on the lower legs. It can also appear on the arms, face, and other areas. Cellulitis develops when bacteria enter a crack or break in your skin, such as a scratch, bite, or cut.     DISCHARGE INSTRUCTIONS:   Return to the emergency department if:   Your wound gets larger and more painful.    You feel a crackling under your skin when you touch it.    You have purple dots or bumps on your skin, or you see bleeding under your skin.    You see red streaks coming from the infected area.    Call your doctor if:   The red, warm, swollen area gets larger.    Your fever or pain does not go away or gets worse.    The area does not get smaller after 3 days of antibiotics.    You have questions or concerns about your condition or care.    Medicines:  You should start to see improvement in 3 days. If cellulitis is not treated, the infection can spread through your body and become life-threatening. You may need any of the following medicines:  Antibiotics  help treat the bacterial infection.    Acetaminophen  decreases pain and fever. It is available without a doctor's order. Ask how much to take and how often to take it. Follow directions. Read the labels of all other medicines you are using to see if they also contain acetaminophen, or ask your doctor or pharmacist. Acetaminophen can cause liver damage if not taken correctly. Do not use more than 4 grams (4,000 milligrams) total of acetaminophen in one day.     NSAIDs , such as  ibuprofen, help decrease swelling, pain, and fever. This medicine is available with or without a doctor's order. NSAIDs can cause stomach bleeding or kidney problems in certain people. If you take blood thinner medicine, always ask your healthcare provider if NSAIDs are safe for you. Always read the medicine label and follow directions.    Take your medicine as directed.  Contact your healthcare provider if you think your medicine is not helping or if you have side effects. Tell him or her if you are allergic to any medicine. Keep a list of the medicines, vitamins, and herbs you take. Include the amounts, and when and why you take them. Bring the list or the pill bottles to follow-up visits. Carry your medicine list with you in case of an emergency.    Self-care:   Wash the area with soap and water every day.  Gently pat dry. Use bandages if directed by your healthcare provider.    Elevate the area above the level of your heart  as often as you can. This will help decrease swelling and pain. Prop the area on pillows or blankets to keep it elevated comfortably.         Place a cool, damp cloth on the area.  Use clean cloths and clean water. You can do this as often as you need to. Cool, damp cloths may help decrease pain.    Apply cream or ointment as directed.  These help protect the area. Most over-the-counter products, such as petroleum jelly, are good to use. Ask your healthcare provider about specific creams or ointments you should use.    Prevent cellulitis:   Do not scratch bug bites or areas of injury.  You increase your risk for cellulitis by scratching these areas.    Do not share personal items, such as towels, clothing, and razors.    Clean exercise equipment  with germ-killing  before and after you use it.    Treat athlete's foot.  This can help prevent the spread of a bacterial skin infection.    Wash your hands often.  Use soap and water. Wash your hands after you use the bathroom, change a  child's diapers, or sneeze. Wash your hands before you prepare or eat food. Use lotion to prevent dry, cracked skin.       Follow up with your doctor within 3 days, or as directed:  He or she will check if your cellulitis is getting better. Write down your questions so you remember to ask them during your visits.  © Copyright [employer] 2021 Information is for End User's use only and may not be sold, redistributed or otherwise used for commercial purposes. All illustrations and images included in CareNotes® are the copyrighted property of [employer] or [employer]  The above information is an  only. It is not intended as medical advice for individual conditions or treatments. Talk to your doctor, nurse or pharmacist before following any medical regimen to see if it is safe and effective for you.

## 2025-03-02 NOTE — PROGRESS NOTES
"  [facility]        NAME: Haja Shen is a 59 y.o. male  : 1965    MRN: [id number]  DATE: 2025  TIME: 1:01 PM    Assessment and Plan   Cellulitis of right lower extremity [L03.115]  1. Cellulitis of right lower extremity  cephalexin (KEFLEX) 500 mg capsule        Area of erythema marked with skin marker.  Recommend proceeding to ER for further evaluation. Patient declining to go and would prefer to start oral antibiotics instead. Will proceed to the ER if fevers persist or symptoms worsen on the antibiotics.     Patient Instructions     Rest and elevation.  Start Cephalexin  Tylenol and Motrin OTC for pain  Monitor for signs of worsening infection such as increased pain, redness, swelling, fever, or no improvement in 2-3 days    Follow up with Primary Care Physician.   Return to clinic or go to the nearest Emergency Department with new or worsening symptoms as discussed.     Chief Complaint     Chief Complaint   Patient presents with    Rash     Burning and painful rash on right inner thigh. Warm to touch. Fever started Friday \"105\" last night. No cold sx.          History of Present Illness       The patient presents today with his wife for complaints of fever/chills, fatigue, R leg pain since Friday. He states his fever spiked at 105 last night. He looked at his leg this morning and realized it was red and swollen. Denies any fevers today so far.         Review of Systems   Review of Systems   Constitutional:  Positive for chills, fatigue and fever.   HENT:  Negative for congestion, ear pain, postnasal drip, rhinorrhea, sinus pain and sore throat.    Respiratory:  Positive for cough (chronic).    Gastrointestinal:  Negative for abdominal pain, diarrhea, nausea and vomiting.   Genitourinary:  Negative for difficulty urinating.   Musculoskeletal:  Negative for myalgias.   Skin:  Positive for color change (R thigh). Negative for rash.         Current Medications       Current Outpatient " Medications:     ammonium lactate (LAC-HYDRIN) 12 % lotion, Apply topically 2 (two) times a day as needed for dry skin, Disp: 400 g, Rfl: 2    atorvastatin (LIPITOR) 20 mg tablet, take 1 tablet by mouth once daily, Disp: 90 tablet, Rfl: 1    cephalexin (KEFLEX) 500 mg capsule, Take 1 capsule (500 mg total) by mouth every 6 (six) hours for 7 days, Disp: 28 capsule, Rfl: 0    clobetasol (TEMOVATE) 0.05 % ointment, , Disp: , Rfl:     Fluticasone-Salmeterol (Advair Diskus) 250-50 mcg/dose inhaler, Inhale 1 puff 2 (two) times a day Rinse mouth after use., Disp: 60 blister, Rfl: 11    levothyroxine 200 mcg tablet, take 1 tablet by mouth once daily, Disp: 90 tablet, Rfl: 1    levothyroxine 75 mcg tablet, take 1 tablet by mouth once daily, Disp: 90 tablet, Rfl: 1    losartan (COZAAR) 100 MG tablet, Take 1 tablet (100 mg total) by mouth daily, Disp: 90 tablet, Rfl: 3    metFORMIN (GLUCOPHAGE-XR) 500 mg 24 hr tablet, take 1 tablet by mouth once daily with dinner, Disp: 90 tablet, Rfl: 1    Semaglutide-Weight Management (Wegovy) 0.5 MG/0.5ML, Inject 0.5 mg under the skin weekly, Disp: 2 mL, Rfl: 0    tacrolimus (PROTOPIC) 0.1 % ointment, , Disp: , Rfl:     triamcinolone (KENALOG) 0.1 % ointment, Apply topically 2 (two) times a day for 14 days, Disp: 30 g, Rfl: 2    Current Facility-Administered Medications:     cyanocobalamin injection 1,000 mcg, 1,000 mcg, Intramuscular, Q30 Days, Idalia Peñaloza MD, 1,000 mcg at 02/19/25 0857    Current Allergies     Allergies as of 03/02/2025 - Reviewed 03/02/2025   Allergen Reaction Noted    Cat dander  04/11/2017            The following portions of the patient's history were reviewed and updated as appropriate: allergies, current medications, past family history, past medical history, past social history, past surgical history and problem list.     Past Medical History:   Diagnosis Date    Asthma     Disease of thyroid gland     Hematoma of left axilla     Last Assessed:9/23/2015     Hypertension     Hyperthyroidism     Hypothyroidism     Toxic multinodular goiter     Last Assessed:2013       Past Surgical History:   Procedure Laterality Date    KNEE ARTHROSCOPY Left     THYROID SURGERY      Ablation       Family History   Problem Relation Age of Onset    Diabetes Mother         mellitus    Parkinsonism Father     Hypothyroidism Sister     Cancer Maternal Grandmother     Diabetes Maternal Aunt     Diabetes Maternal Uncle     Heart disease Neg Hx     Stroke Neg Hx          Medications have been verified.        Objective   /80   Pulse 105   Temp 97.7 °F (36.5 °C)   Resp 20   Wt (!) 136 kg (300 lb 8 oz)   SpO2 94%   BMI 41.91 kg/m²        Physical Exam     Physical Exam  Vitals and nursing note reviewed.   Constitutional:       General: He is not in acute distress.     Appearance: Normal appearance. He is obese.   HENT:      Head: Normocephalic and atraumatic.      Right Ear: External ear normal.      Left Ear: External ear normal.      Mouth/Throat:      Mouth: Mucous membranes are moist.   Eyes:      Pupils: Pupils are equal, round, and reactive to light.   Cardiovascular:      Rate and Rhythm: Tachycardia present.      Comments: PVD discoloration to lower legs.   Pulmonary:      Effort: Pulmonary effort is normal.   Musculoskeletal:      Right lower le+ Pitting Edema present.      Left lower le+ Pitting Edema present.   Skin:     General: Skin is warm and dry.      Findings: Erythema present.             Comments: R calf measures 53.5 cm and L calf measures 51 cm.   Dry flaky skin to lower legs   Neurological:      Mental Status: He is alert and oriented to person, place, and time. Mental status is at baseline.   Psychiatric:         Mood and Affect: Mood normal.         Behavior: Behavior normal.

## 2025-03-03 ENCOUNTER — HOSPITAL ENCOUNTER (EMERGENCY)
Facility: HOSPITAL | Age: 60
Discharge: HOME | End: 2025-03-03
Attending: EMERGENCY MEDICINE | Admitting: EMERGENCY MEDICINE
Payer: COMMERCIAL

## 2025-03-03 ENCOUNTER — APPOINTMENT (EMERGENCY)
Dept: CT IMAGING | Facility: HOSPITAL | Age: 60
End: 2025-03-03
Payer: COMMERCIAL

## 2025-03-03 ENCOUNTER — APPOINTMENT (EMERGENCY)
Dept: NON INVASIVE DIAGNOSTICS | Facility: HOSPITAL | Age: 60
End: 2025-03-03
Payer: COMMERCIAL

## 2025-03-03 VITALS
HEART RATE: 71 BPM | TEMPERATURE: 97.8 F | DIASTOLIC BLOOD PRESSURE: 63 MMHG | SYSTOLIC BLOOD PRESSURE: 125 MMHG | RESPIRATION RATE: 18 BRPM | OXYGEN SATURATION: 97 %

## 2025-03-03 DIAGNOSIS — L03.115 CELLULITIS OF RIGHT LOWER LEG: Primary | ICD-10-CM

## 2025-03-03 LAB
ANION GAP SERPL CALCULATED.3IONS-SCNC: 5 MMOL/L (ref 4–13)
BASOPHILS # BLD AUTO: 0.06 THOUSANDS/ÂΜL (ref 0–0.1)
BASOPHILS NFR BLD AUTO: 1 % (ref 0–1)
BUN SERPL-MCNC: 10 MG/DL (ref 5–25)
CALCIUM SERPL-MCNC: 8.5 MG/DL (ref 8.4–10.2)
CHLORIDE SERPL-SCNC: 98 MMOL/L (ref 96–108)
CO2 SERPL-SCNC: 31 MMOL/L (ref 21–32)
CREAT SERPL-MCNC: 0.86 MG/DL (ref 0.6–1.3)
EOSINOPHIL # BLD AUTO: 0.37 THOUSAND/ÂΜL (ref 0–0.61)
EOSINOPHIL NFR BLD AUTO: 3 % (ref 0–6)
ERYTHROCYTE [DISTWIDTH] IN BLOOD BY AUTOMATED COUNT: 16.4 % (ref 11.6–15.1)
GFR SERPL CREATININE-BSD FRML MDRD: 96 ML/MIN/1.73SQ M
GLUCOSE SERPL-MCNC: 125 MG/DL (ref 65–140)
HCT VFR BLD AUTO: 38.8 % (ref 36.5–49.3)
HGB BLD-MCNC: 11.8 G/DL (ref 12–17)
IMM GRANULOCYTES # BLD AUTO: 0.12 THOUSAND/UL (ref 0–0.2)
IMM GRANULOCYTES NFR BLD AUTO: 1 % (ref 0–2)
LYMPHOCYTES # BLD AUTO: 1.38 THOUSANDS/ÂΜL (ref 0.6–4.47)
LYMPHOCYTES NFR BLD AUTO: 12 % (ref 14–44)
MCH RBC QN AUTO: 25.7 PG (ref 26.8–34.3)
MCHC RBC AUTO-ENTMCNC: 30.4 G/DL (ref 31.4–37.4)
MCV RBC AUTO: 85 FL (ref 82–98)
MONOCYTES # BLD AUTO: 1.31 THOUSAND/ÂΜL (ref 0.17–1.22)
MONOCYTES NFR BLD AUTO: 12 % (ref 4–12)
NEUTROPHILS # BLD AUTO: 8.14 THOUSANDS/ÂΜL (ref 1.85–7.62)
NEUTS SEG NFR BLD AUTO: 71 % (ref 43–75)
NRBC BLD AUTO-RTO: 0 /100 WBCS
PLATELET # BLD AUTO: 189 THOUSANDS/UL (ref 149–390)
PMV BLD AUTO: 9.9 FL (ref 8.9–12.7)
POTASSIUM SERPL-SCNC: 3.8 MMOL/L (ref 3.5–5.3)
RBC # BLD AUTO: 4.59 MILLION/UL (ref 3.88–5.62)
SODIUM SERPL-SCNC: 134 MMOL/L (ref 135–147)
WBC # BLD AUTO: 11.38 THOUSAND/UL (ref 4.31–10.16)

## 2025-03-03 PROCEDURE — 80048 BASIC METABOLIC PNL TOTAL CA: CPT

## 2025-03-03 PROCEDURE — 93971 EXTREMITY STUDY: CPT

## 2025-03-03 PROCEDURE — 99284 EMERGENCY DEPT VISIT MOD MDM: CPT

## 2025-03-03 PROCEDURE — 96375 TX/PRO/DX INJ NEW DRUG ADDON: CPT

## 2025-03-03 PROCEDURE — 36415 COLL VENOUS BLD VENIPUNCTURE: CPT

## 2025-03-03 PROCEDURE — 96365 THER/PROPH/DIAG IV INF INIT: CPT

## 2025-03-03 PROCEDURE — 99284 EMERGENCY DEPT VISIT MOD MDM: CPT | Performed by: EMERGENCY MEDICINE

## 2025-03-03 PROCEDURE — 85025 COMPLETE CBC W/AUTO DIFF WBC: CPT

## 2025-03-03 PROCEDURE — 73701 CT LOWER EXTREMITY W/DYE: CPT

## 2025-03-03 RX ORDER — CEFTRIAXONE 1 G/50ML
1000 INJECTION, SOLUTION INTRAVENOUS ONCE
Status: COMPLETED | OUTPATIENT
Start: 2025-03-03 | End: 2025-03-03

## 2025-03-03 RX ORDER — KETOROLAC TROMETHAMINE 30 MG/ML
15 INJECTION, SOLUTION INTRAMUSCULAR; INTRAVENOUS ONCE
Status: COMPLETED | OUTPATIENT
Start: 2025-03-03 | End: 2025-03-03

## 2025-03-03 RX ADMIN — SODIUM CHLORIDE 500 ML: 0.9 INJECTION, SOLUTION INTRAVENOUS at 18:26

## 2025-03-03 RX ADMIN — KETOROLAC TROMETHAMINE 15 MG: 30 INJECTION, SOLUTION INTRAMUSCULAR at 18:27

## 2025-03-03 RX ADMIN — CEFTRIAXONE 1000 MG: 1 INJECTION, SOLUTION INTRAVENOUS at 18:26

## 2025-03-03 RX ADMIN — IOHEXOL 100 ML: 350 INJECTION, SOLUTION INTRAVENOUS at 19:40

## 2025-03-03 NOTE — ED PROVIDER NOTES
Time reflects when diagnosis was documented in both MDM as applicable and the Disposition within this note       Time User Action Codes Description Comment    3/3/2025  8:36 PM Mark Nelson Add [L03.115] Cellulitis of right lower leg           ED Disposition       ED Disposition   Discharge    Condition   Stable    Date/Time   Mon Mar 3, 2025  8:36 PM    Comment   Haja Shen discharge to home/self care.                   Assessment & Plan       Medical Decision Making  Will evaluate for DVT and worsening infection with vascular duplex and CT scan.  Basic set of labs, IV fluids and antibiotics.  Duplex negative for DVT.  CT scan negative for abscess or necrotizing soft tissue infection.  Will discharge home.  Patient to continue outpatient antibiotics, Keflex 4 times daily x 7 days.    Disposition: Discharged with instructions to obtain outpatient follow up of patient's symptoms and findings, with strict return precautions if patient develops new or worsening symptoms. Patient understands this plan and is agreeable. All questions answered. Patient discharged home with return precautions.      Amount and/or Complexity of Data Reviewed  Labs: ordered. Decision-making details documented in ED Course.  Radiology: ordered. Decision-making details documented in ED Course.    Risk  Prescription drug management.        ED Course as of 03/03/25 2127   Mon Mar 03, 2025   1834 VAS lower limb venous duplex study, unilateral/limited  negative   1850 Sodium(!): 134   1850 Potassium: 3.8   1850 Creatinine: 0.86   1850 GLUCOSE: 125   1850 Calcium: 8.5   1850 GFR, Calculated: 96   1850 WBC(!): 11.38   1850 Hemoglobin(!): 11.8   1850 Hematocrit: 38.8   1850 Platelet Count: 189   2004 Patient reevaluated at this time.  pain feels improved.  Will likely discharge home.  Vascular tech states venous duplex is negative.       Medications   cefTRIAXone (ROCEPHIN) IVPB (premix in dextrose) 1,000 mg 50 mL (0 mg Intravenous Stopped  3/3/25 1942)   sodium chloride 0.9 % bolus 500 mL (0 mL Intravenous Stopped 3/3/25 1942)   ketorolac (TORADOL) injection 15 mg (15 mg Intravenous Given 3/3/25 1827)   iohexol (OMNIPAQUE) 350 MG/ML injection (MULTI-DOSE) 100 mL (100 mL Intravenous Given 3/3/25 1940)       ED Risk Strat Scores                            SBIRT 22yo+      Flowsheet Row Most Recent Value   Initial Alcohol Screen: US AUDIT-C     1. How often do you have a drink containing alcohol? 0 Filed at: 03/03/2025 1906   2. How many drinks containing alcohol do you have on a typical day you are drinking?  0 Filed at: 03/03/2025 1906   3a. Male UNDER 65: How often do you have five or more drinks on one occasion? 0 Filed at: 03/03/2025 1906   3b. FEMALE Any Age, or MALE 65+: How often do you have 4 or more drinks on one occassion? 0 Filed at: 03/03/2025 1906   Audit-C Score 0 Filed at: 03/03/2025 1906   MARTHA: How many times in the past year have you...    Used an illegal drug or used a prescription medication for non-medical reasons? Never Filed at: 03/03/2025 1906                            History of Present Illness       Chief Complaint   Patient presents with    Cellulitis     Right leg swelling and cellulitis that has been worsening       Past Medical History:   Diagnosis Date    Asthma     Disease of thyroid gland     Hematoma of left axilla     Last Assessed:9/23/2015    Hypertension     Hyperthyroidism     Hypothyroidism     Toxic multinodular goiter     Last Assessed:12/26/2013      Past Surgical History:   Procedure Laterality Date    KNEE ARTHROSCOPY Left     THYROID SURGERY      Ablation      Family History   Problem Relation Age of Onset    Diabetes Mother         mellitus    Parkinsonism Father     Hypothyroidism Sister     Cancer Maternal Grandmother     Diabetes Maternal Aunt     Diabetes Maternal Uncle     Heart disease Neg Hx     Stroke Neg Hx       Social History     Tobacco Use    Smoking status: Former     Current packs/day: 0.00      Average packs/day: 0.8 packs/day for 15.0 years (11.3 ttl pk-yrs)     Types: Cigarettes     Start date: 1992     Quit date: 2007     Years since quittin.1    Smokeless tobacco: Never   Vaping Use    Vaping status: Never Used   Substance Use Topics    Alcohol use: Yes     Alcohol/week: 2.0 standard drinks of alcohol     Types: 2 Cans of beer per week     Comment: Social drinker     Drug use: No      E-Cigarette/Vaping    E-Cigarette Use Never User       E-Cigarette/Vaping Substances    Nicotine No     THC No     CBD No     Flavoring No     Other No     Unknown No       I have reviewed and agree with the history as documented.     59-year-old male with past medical history of prediabetes, asthma, resents emergency department over concern of worsening cellulitis on right leg.  Sinus with cellulitis yesterday in urgent care.  They circumscribed 2 areas of cellulitis on his right lower extremity.  He was started on Keflex, 4 times daily x 7 days.  Mentions on , he had high-grade fever 105 °F.  Since that point in time he has been dealing with persistent chills.  Using Tylenol daily.  Does have a history of cellulitis.        Review of Systems   Constitutional:  Positive for chills.   Skin:  Positive for rash.   All other systems reviewed and are negative.          Objective       ED Triage Vitals [25 1800]   Temperature Pulse Blood Pressure Respirations SpO2 Patient Position - Orthostatic VS   98.6 °F (37 °C) 71 161/84 18 98 % Lying      Temp Source Heart Rate Source BP Location FiO2 (%) Pain Score    Oral Monitor Left arm -- --      Vitals      Date and Time Temp Pulse SpO2 Resp BP Pain Score FACES Pain Rating User   25 97.8 °F (36.6 °C) -- -- -- -- -- --    25 -- 71 97 % 18 125/63 -- -- DK   25 -- 60 100 % -- 129/62 -- --    25 -- 68 98 % -- 124/64 -- --    25 1800 98.6 °F (37 °C) 71 98 % 18 161/84 -- -- SG            Physical  Exam  Vitals and nursing note reviewed.   Constitutional:       General: He is not in acute distress.     Appearance: He is well-developed.   HENT:      Head: Normocephalic and atraumatic.   Eyes:      Conjunctiva/sclera: Conjunctivae normal.   Cardiovascular:      Rate and Rhythm: Normal rate and regular rhythm.      Heart sounds: No murmur heard.  Pulmonary:      Effort: Pulmonary effort is normal. No respiratory distress.      Breath sounds: Normal breath sounds.   Abdominal:      Palpations: Abdomen is soft.      Tenderness: There is no abdominal tenderness.   Musculoskeletal:         General: No swelling.      Cervical back: Neck supple.        Legs:    Skin:     General: Skin is warm and dry.      Capillary Refill: Capillary refill takes less than 2 seconds.      Findings: Rash present.          Neurological:      General: No focal deficit present.      Mental Status: He is alert and oriented to person, place, and time. Mental status is at baseline.      GCS: GCS eye subscore is 4. GCS verbal subscore is 5. GCS motor subscore is 6.      Cranial Nerves: No cranial nerve deficit.      Sensory: No sensory deficit.      Motor: No weakness.      Coordination: Coordination normal.      Gait: Gait normal.   Psychiatric:         Mood and Affect: Mood normal.         Results Reviewed       Procedure Component Value Units Date/Time    Basic metabolic panel [[id number]]  (Abnormal) Collected: 03/03/25 1828    Lab Status: Final result Specimen: Blood from Arm, Left Updated: 03/03/25 1849     Sodium 134 mmol/L      Potassium 3.8 mmol/L      Chloride 98 mmol/L      CO2 31 mmol/L      ANION GAP 5 mmol/L      BUN 10 mg/dL      Creatinine 0.86 mg/dL      Glucose 125 mg/dL      Calcium 8.5 mg/dL      eGFR 96 ml/min/1.73sq m     Narrative:      National Kid[employer] Foundation guidelines for Chronic Kidney Disease (CKD):     Stage 1 with normal or high GFR (GFR > 90 mL/min/1.73 square meters)    Stage 2 Mild CKD (GFR = 60-89  mL/min/1.73 square meters)    Stage 3A Moderate CKD (GFR = 45-59 mL/min/1.73 square meters)    Stage 3B Moderate CKD (GFR = 30-44 mL/min/1.73 square meters)    Stage 4 Severe CKD (GFR = 15-29 mL/min/1.73 square meters)    Stage 5 End Stage CKD (GFR <15 mL/min/1.73 square meters)  Note: GFR calculation is accurate only with a steady state creatinine    CBC and differential [[id number]]  (Abnormal) Collected: 03/03/25 1828    Lab Status: Final result Specimen: Blood from Arm, Left Updated: 03/03/25 1833     WBC 11.38 Thousand/uL      RBC 4.59 Million/uL      Hemoglobin 11.8 g/dL      Hematocrit 38.8 %      MCV 85 fL      MCH 25.7 pg      MCHC 30.4 g/dL      RDW 16.4 %      MPV 9.9 fL      Platelets 189 Thousands/uL      nRBC 0 /100 WBCs      Segmented % 71 %      Immature Grans % 1 %      Lymphocytes % 12 %      Monocytes % 12 %      Eosinophils Relative 3 %      Basophils Relative 1 %      Absolute Neutrophils 8.14 Thousands/µL      Absolute Immature Grans 0.12 Thousand/uL      Absolute Lymphocytes 1.38 Thousands/µL      Absolute Monocytes 1.31 Thousand/µL      Eosinophils Absolute 0.37 Thousand/µL      Basophils Absolute 0.06 Thousands/µL             CT lower extremity w contrast right   Final Interpretation by Willi Dejesus MD (03/03 2033)      1.  Moderate diffuse nonspecific subcutaneous edema, without evidence of an organized fluid collection or soft tissue emphysema.   2.  Degenerative changes, as described.         Workstation performed: [id number]         VAS lower limb venous duplex study, unilateral/limited    (Results Pending)       Procedures    ED Medication and Procedure Management   Prior to Admission Medications   Prescriptions Last Dose Informant Patient Reported? Taking?   Fluticasone-Salmeterol (Advair Diskus) 250-50 mcg/dose inhaler  Self No No   Sig: Inhale 1 puff 2 (two) times a day Rinse mouth after use.   Semaglutide-Weight Management (Wegovy) 0.5 MG/0.5ML   No No   Sig: Inject 0.5 mg  under the skin weekly   ammonium lactate (LAC-HYDRIN) 12 % lotion  Self No No   Sig: Apply topically 2 (two) times a day as needed for dry skin   atorvastatin (LIPITOR) 20 mg tablet  Self No No   Sig: take 1 tablet by mouth once daily   cephalexin (KEFLEX) 500 mg capsule   No No   Sig: Take 1 capsule (500 mg total) by mouth every 6 (six) hours for 7 days   clobetasol (TEMOVATE) 0.05 % ointment  Self Yes No   levothyroxine 200 mcg tablet  Self No No   Sig: take 1 tablet by mouth once daily   levothyroxine 75 mcg tablet  Self No No   Sig: take 1 tablet by mouth once daily   losartan (COZAAR) 100 MG tablet  Self No No   Sig: Take 1 tablet (100 mg total) by mouth daily   metFORMIN (GLUCOPHAGE-XR) 500 mg 24 hr tablet  Self No No   Sig: take 1 tablet by mouth once daily with dinner   tacrolimus (PROTOPIC) 0.1 % ointment  Self Yes No   triamcinolone (KENALOG) 0.1 % ointment  Self No No   Sig: Apply topically 2 (two) times a day for 14 days      Facility-Administered Medications Last Administration Doses Remaining   cyanocobalamin injection 1,000 mcg 2/19/2025  8:57 AM         Discharge Medication List as of 3/3/2025  8:40 PM        CONTINUE these medications which have NOT CHANGED    Details   ammonium lactate (LAC-HYDRIN) 12 % lotion Apply topically 2 (two) times a day as needed for dry skin, Starting Fri 11/3/2023, Normal      atorvastatin (LIPITOR) 20 mg tablet take 1 tablet by mouth once daily, Starting Mon 9/9/2024, Normal      cephalexin (KEFLEX) 500 mg capsule Take 1 capsule (500 mg total) by mouth every 6 (six) hours for 7 days, Starting Sun 3/2/2025, Until Sun 3/9/2025, Normal      clobetasol (TEMOVATE) 0.05 % ointment Historical Med      Fluticasone-Salmeterol (Advair Diskus) 250-50 mcg/dose inhaler Inhale 1 puff 2 (two) times a day Rinse mouth after use., Starting Mon 3/18/2024, Normal      !! levothyroxine 200 mcg tablet take 1 tablet by mouth once daily, Starting Mon 9/9/2024, Normal      !! levothyroxine 75  mcg tablet take 1 tablet by mouth once daily, Starting Mon 9/9/2024, Normal      losartan (COZAAR) 100 MG tablet Take 1 tablet (100 mg total) by mouth daily, Starting Tue 9/24/2024, Normal      metFORMIN (GLUCOPHAGE-XR) 500 mg 24 hr tablet take 1 tablet by mouth once daily with dinner, Starting Mon 9/9/2024, Normal      Semaglutide-Weight Management (Wegovy) 0.5 MG/0.5ML Inject 0.5 mg under the skin weekly, Normal      tacrolimus (PROTOPIC) 0.1 % ointment Historical Med      triamcinolone (KENALOG) 0.1 % ointment Apply topically 2 (two) times a day for 14 days, Starting Tue 11/14/2023, Until Mon 1/27/2025, Normal       !! - Potential duplicate medications found. Please discuss with provider.        No discharge procedures on file.  ED SEPSIS DOCUMENTATION   Time reflects when diagnosis was documented in both MDM as applicable and the Disposition within this note       Time User Action Codes Description Comment    3/3/2025  8:36 PM Mark Nelson Add [L03.115] Cellulitis of right lower leg                  Mark Nelson, DO  03/03/25 2122

## 2025-03-03 NOTE — Clinical Note
Haja Shen was seen and treated in our emergency department on 3/3/2025.            as tolerated    Diagnosis: ER visit    Haja  .    He may return on this date: 03/05/2025         If you have any questions or concerns, please don't hesitate to call.      Mark Nelson, DO    ______________________________           _______________          _______________  Hospital Representative                              Date                                Time

## 2025-03-04 PROCEDURE — 93971 EXTREMITY STUDY: CPT | Performed by: SURGERY

## 2025-03-04 NOTE — DISCHARGE INSTRUCTIONS
Continue taking Keflex.  Return to the emergency department if your symptoms worsening or rash is spreading.  Return to the emergency department if you experience high fevers, severe pain, bullae formation

## 2025-03-06 DIAGNOSIS — R73.03 PREDIABETES: ICD-10-CM

## 2025-03-06 DIAGNOSIS — E03.9 HYPOTHYROIDISM, UNSPECIFIED TYPE: ICD-10-CM

## 2025-03-06 DIAGNOSIS — E78.00 HYPERCHOLESTEROLEMIA: ICD-10-CM

## 2025-03-07 RX ORDER — METFORMIN HYDROCHLORIDE 500 MG/1
500 TABLET, EXTENDED RELEASE ORAL
Qty: 90 TABLET | Refills: 1 | Status: SHIPPED | OUTPATIENT
Start: 2025-03-07

## 2025-03-07 RX ORDER — LEVOTHYROXINE SODIUM 75 UG/1
75 TABLET ORAL DAILY
Qty: 90 TABLET | Refills: 1 | Status: SHIPPED | OUTPATIENT
Start: 2025-03-07

## 2025-03-07 RX ORDER — LEVOTHYROXINE SODIUM 200 UG/1
200 TABLET ORAL DAILY
Qty: 90 TABLET | Refills: 1 | Status: SHIPPED | OUTPATIENT
Start: 2025-03-07

## 2025-03-07 RX ORDER — ATORVASTATIN CALCIUM 20 MG/1
20 TABLET, FILM COATED ORAL DAILY
Qty: 90 TABLET | Refills: 1 | Status: SHIPPED | OUTPATIENT
Start: 2025-03-07

## 2025-03-07 NOTE — ED ATTENDING ATTESTATION
3/3/2025  I, Cole Juarez MD, saw and evaluated the patient. I have discussed the patient with the resident/non-physician practitioner and agree with the resident's/non-physician practitioner's findings, Plan of Care, and MDM as documented in the resident's/non-physician practitioner's note, except where noted. All available labs and Radiology studies were reviewed.  I was present for key portions of any procedure(s) performed by the resident/non-physician practitioner and I was immediately available to provide assistance.       At this point I agree with the current assessment done in the Emergency Department.  I have conducted an independent evaluation of this patient a history and physical is as follows:    Subjective: Patient is a 59-year-old male presents for evaluation of right lower extremity cellulitis.  Started on Keflex 24 hours ago.  Patient's leg is slightly more swollen though the erythema outlined yesterday is not expanding outside of the borders.  He otherwise denies nausea vomiting or fevers.  Some moderate pain associated with the swelling.    Objective: Markedly swollen right lower extremity with some pitting edema up into the thigh.  Erythema within previously marked borders.  Neurovascularly intact right lower extremity.    Assessment and plan: Patient presents for reevaluation of cellulitis.  Duplex negative for DVT.  CT imaging negative for abscess.  Will plan for continued Keflex as patient has only been on the medication for 24 hours and this is not criteria for outpatient failure of management.  Will discharge with PCP follow-up given.    ED Course  ED Course as of 03/07/25 1402   Mon Mar 03, 2025   1804 Keflex, worsening pain and swelling.          Critical Care Time  Procedures

## 2025-03-12 ENCOUNTER — OFFICE VISIT (OUTPATIENT)
Dept: INTERNAL MEDICINE CLINIC | Age: 60
End: 2025-03-12
Payer: COMMERCIAL

## 2025-03-12 VITALS
DIASTOLIC BLOOD PRESSURE: 80 MMHG | HEART RATE: 79 BPM | OXYGEN SATURATION: 95 % | TEMPERATURE: 99.2 F | SYSTOLIC BLOOD PRESSURE: 140 MMHG | WEIGHT: 304 LBS | BODY MASS INDEX: 42.4 KG/M2

## 2025-03-12 DIAGNOSIS — L03.115 CELLULITIS OF RIGHT LEG: ICD-10-CM

## 2025-03-12 DIAGNOSIS — Z12.11 SCREENING FOR COLORECTAL CANCER: Primary | ICD-10-CM

## 2025-03-12 DIAGNOSIS — Z12.12 SCREENING FOR COLORECTAL CANCER: Primary | ICD-10-CM

## 2025-03-12 DIAGNOSIS — I10 BENIGN ESSENTIAL HTN: Chronic | ICD-10-CM

## 2025-03-12 DIAGNOSIS — Z91.89 RISK OF EXPOSURE TO LYME DISEASE: ICD-10-CM

## 2025-03-12 DIAGNOSIS — R50.9 FEVER, UNSPECIFIED FEVER CAUSE: ICD-10-CM

## 2025-03-12 PROCEDURE — 99214 OFFICE O/P EST MOD 30 MIN: CPT | Performed by: PHYSICIAN ASSISTANT

## 2025-03-12 RX ORDER — DOXYCYCLINE 100 MG/1
100 CAPSULE ORAL EVERY 12 HOURS SCHEDULED
Qty: 42 CAPSULE | Refills: 0 | Status: SHIPPED | OUTPATIENT
Start: 2025-03-12 | End: 2025-04-02

## 2025-03-12 NOTE — PROGRESS NOTES
"Name: Haja Shen      : 1965      MRN: [id number]  Encounter Provider: Zaira White PA-C  Encounter Date: 3/12/2025   Encounter department: [facility]  :  Assessment & Plan  Screening for colorectal cancer    Orders:    Cologuard    Cellulitis of right leg  Change to doxycycline twice daily, advised to take with full glass of water and avoid sun exposure  Advised to take daily probiotic due to recent antibiotic use  Advised to return to ED if symptoms are not improving in 24 to 48 hours  Discussed limitations of treating cellulitis as an outpatient and potential need for IV antibiotic therapy  Patient understands and will follow up if not improved in the short-term  Orders:    Lyme Total AB W Reflex to IGM/IGG; Future    CBC and differential; Future    Blood culture; Future    Blood culture; Future    doxycycline hyclate (VIBRAMYCIN) 100 mg capsule; Take 1 capsule (100 mg total) by mouth every 12 (twelve) hours for 21 days    Risk of exposure to Lyme disease  Check Lyme titer  Orders:    Lyme Total AB W Reflex to IGM/IGG; Future    Fever, unspecified fever cause  Patient reports no fevers since ED however he is advised to monitor temperatures and go to ED if over 100.4  Orders:    Blood culture; Future    Blood culture; Future    Benign essential HTN  Continue losartan              History of Present Illness   59-year-old male presents for follow-up regarding right lower extremity cellulitis  Patient reports on 3/2 he developed redness and swelling in the right lower extremity thigh and calf along with fevers and night sweats  Patient went to urgent care regarding the same and was started on cephalexin  His fevers continued which prompted him to go to the ED the following day at which time he was given Rocephin IV  CT scan of the right lower extremity revealed \"Moderate diffuse nonspecific subcutaneous edema, without evidence of an organized fluid collection or soft " "tissue emphysema.\"  And Doppler ultrasound of the right lower extremity was negative for acute DVT.  Additional findings include WBC of 11.3.  Patient was discharged to home and was to continue his course of cephalexin which she completed yesterday.  Patient reports the swelling seems somewhat improved but the redness has not and reports the swelling is worse as the day goes on.  He denies fevers, night sweats or chills.  He denies change in appetite nauseousness or vomiting.    Based on his clinical assessment the rash appears to be in a large bull's-eye distribution  We will start doxycycline today and expect improvement within 24 to 48 hours  Patient is advised to proceed to the emergency room if this does not occur or if he develops any fevers chills or night sweats or of course worsening of the redness or swelling  Patient reports the swelling and redness is improved however this is not appreciated on evaluation of picture in chart from 3/2/25    Patient advised to go for blood work ASAP  Elevate leg when able to          Review of Systems   Constitutional:  Negative for appetite change, chills, diaphoresis and fever.   HENT:  Negative for congestion and sore throat.    Eyes:  Negative for pain and redness.   Respiratory:  Negative for cough and shortness of breath.    Gastrointestinal:  Negative for abdominal pain, constipation, diarrhea and nausea.   Musculoskeletal:  Positive for arthralgias and joint swelling.   Skin:  Positive for rash and wound.   Neurological:  Negative for dizziness, light-headedness and headaches.   Psychiatric/Behavioral:  Negative for sleep disturbance. The patient is not nervous/anxious.        Objective   /80 (BP Location: Left arm, Patient Position: Sitting, Cuff Size: Large)   Pulse 79   Temp 99.2 °F (37.3 °C) (Temporal)   Wt (!) 138 kg (304 lb)   SpO2 95%   BMI 42.40 kg/m²      Physical Exam  Vitals reviewed.   Constitutional:       General: He is not in acute " distress.  HENT:      Head: Normocephalic and atraumatic.   Cardiovascular:      Rate and Rhythm: Normal rate and regular rhythm.   Pulmonary:      Effort: Pulmonary effort is normal. No respiratory distress.      Breath sounds: No wheezing.   Musculoskeletal:      Right lower leg: Edema present.   Skin:     Findings: Rash (erythema with central clearing of entire RLE - see photo in media) present. No bruising.   Neurological:      Mental Status: He is alert.

## 2025-03-13 ENCOUNTER — TELEPHONE (OUTPATIENT)
Age: 60
End: 2025-03-13

## 2025-03-13 NOTE — TELEPHONE ENCOUNTER
Spoke with pt, reports pain slightly better unsure if swelling worse  Recommend pt come in tonight or tomorrow for f/u  He states his unable to come in tomorrow and will call with update  I advised him if the swelling is not much improved by tomorrow he should return to the ED for this as sometimes oral antibiotics are not strong enough to treat cellulitis

## 2025-03-13 NOTE — TELEPHONE ENCOUNTER
Patient called states received a call from [facility]. No notations in chart.        Please review.  Thank you

## 2025-03-14 ENCOUNTER — APPOINTMENT (OUTPATIENT)
Dept: LAB | Facility: CLINIC | Age: 60
End: 2025-03-14
Payer: COMMERCIAL

## 2025-03-14 DIAGNOSIS — Z91.89 RISK OF EXPOSURE TO LYME DISEASE: ICD-10-CM

## 2025-03-14 DIAGNOSIS — L03.115 CELLULITIS OF RIGHT LEG: ICD-10-CM

## 2025-03-14 DIAGNOSIS — R50.9 FEVER, UNSPECIFIED FEVER CAUSE: ICD-10-CM

## 2025-03-14 LAB
BASOPHILS # BLD AUTO: 0.09 THOUSANDS/ÂΜL (ref 0–0.1)
BASOPHILS NFR BLD AUTO: 1 % (ref 0–1)
EOSINOPHIL # BLD AUTO: 0.24 THOUSAND/ÂΜL (ref 0–0.61)
EOSINOPHIL NFR BLD AUTO: 3 % (ref 0–6)
ERYTHROCYTE [DISTWIDTH] IN BLOOD BY AUTOMATED COUNT: 16.7 % (ref 11.6–15.1)
HCT VFR BLD AUTO: 41.8 % (ref 36.5–49.3)
HGB BLD-MCNC: 12.6 G/DL (ref 12–17)
IMM GRANULOCYTES # BLD AUTO: 0.04 THOUSAND/UL (ref 0–0.2)
IMM GRANULOCYTES NFR BLD AUTO: 0 % (ref 0–2)
LYMPHOCYTES # BLD AUTO: 1.63 THOUSANDS/ÂΜL (ref 0.6–4.47)
LYMPHOCYTES NFR BLD AUTO: 18 % (ref 14–44)
MCH RBC QN AUTO: 25.7 PG (ref 26.8–34.3)
MCHC RBC AUTO-ENTMCNC: 30.1 G/DL (ref 31.4–37.4)
MCV RBC AUTO: 85 FL (ref 82–98)
MONOCYTES # BLD AUTO: 0.79 THOUSAND/ÂΜL (ref 0.17–1.22)
MONOCYTES NFR BLD AUTO: 9 % (ref 4–12)
NEUTROPHILS # BLD AUTO: 6.13 THOUSANDS/ÂΜL (ref 1.85–7.62)
NEUTS SEG NFR BLD AUTO: 69 % (ref 43–75)
NRBC BLD AUTO-RTO: 0 /100 WBCS
PLATELET # BLD AUTO: 312 THOUSANDS/UL (ref 149–390)
PMV BLD AUTO: 9.8 FL (ref 8.9–12.7)
RBC # BLD AUTO: 4.9 MILLION/UL (ref 3.88–5.62)
WBC # BLD AUTO: 8.92 THOUSAND/UL (ref 4.31–10.16)

## 2025-03-14 PROCEDURE — 86618 LYME DISEASE ANTIBODY: CPT

## 2025-03-14 PROCEDURE — 85025 COMPLETE CBC W/AUTO DIFF WBC: CPT

## 2025-03-14 PROCEDURE — 36415 COLL VENOUS BLD VENIPUNCTURE: CPT

## 2025-03-14 PROCEDURE — 86617 LYME DISEASE ANTIBODY: CPT

## 2025-03-14 PROCEDURE — 87040 BLOOD CULTURE FOR BACTERIA: CPT

## 2025-03-14 NOTE — TELEPHONE ENCOUNTER
Pt called back to let PCP know that the redness was less this morning and the swelling is going down as well.

## 2025-03-15 LAB — B BURGDOR IGG+IGM SER QL IA: POSITIVE

## 2025-03-16 LAB
B BURGDOR IGG SERPL QL IA: NEGATIVE
B BURGDOR IGM SERPL QL IA: NEGATIVE
BACTERIA BLD CULT: NORMAL
BACTERIA BLD CULT: NORMAL

## 2025-03-17 NOTE — TELEPHONE ENCOUNTER
Left message on machine to see how patient's leg is doing and if there is improvement. Zaira would like to see patient in office on Thursday to assess leg. We can also give B12 injection at that visit.

## 2025-03-19 LAB
BACTERIA BLD CULT: NORMAL
BACTERIA BLD CULT: NORMAL

## 2025-03-20 ENCOUNTER — OFFICE VISIT (OUTPATIENT)
Dept: INTERNAL MEDICINE CLINIC | Age: 60
End: 2025-03-20
Payer: COMMERCIAL

## 2025-03-20 VITALS
WEIGHT: 296 LBS | BODY MASS INDEX: 40.09 KG/M2 | TEMPERATURE: 98.8 F | SYSTOLIC BLOOD PRESSURE: 150 MMHG | HEIGHT: 72 IN | DIASTOLIC BLOOD PRESSURE: 84 MMHG | HEART RATE: 89 BPM | OXYGEN SATURATION: 98 %

## 2025-03-20 DIAGNOSIS — I10 BENIGN ESSENTIAL HTN: Chronic | ICD-10-CM

## 2025-03-20 DIAGNOSIS — L03.115 CELLULITIS OF RIGHT LOWER EXTREMITY: Primary | ICD-10-CM

## 2025-03-20 DIAGNOSIS — A69.20 ERYTHEMA MIGRANS (LYME DISEASE): ICD-10-CM

## 2025-03-20 DIAGNOSIS — E53.8 VITAMIN B12 DEFICIENCY: ICD-10-CM

## 2025-03-20 PROCEDURE — 99213 OFFICE O/P EST LOW 20 MIN: CPT | Performed by: PHYSICIAN ASSISTANT

## 2025-03-20 PROCEDURE — 96372 THER/PROPH/DIAG INJ SC/IM: CPT | Performed by: PHYSICIAN ASSISTANT

## 2025-03-20 RX ADMIN — CYANOCOBALAMIN 1000 MCG: 1000 INJECTION, SOLUTION INTRAMUSCULAR; SUBCUTANEOUS at 16:20

## 2025-03-20 NOTE — PROGRESS NOTES
"Name: Haja Shen      : 1965      MRN: [id number]  Encounter Provider: Zaira White PA-C  Encounter Date: 3/20/2025   Encounter department: [facility]  :  Assessment & Plan  Vitamin B12 deficiency [E53.8]  B12 given in office today       Benign essential HTN         Cellulitis of right lower extremity  Complete doxy course as ordered  Again reviewed potential for change in stools or diarrhea patient denies any current concerns but will call if develops any diarrhea  Continue probiotics       Erythema migrans (Lyme disease)  To complete doxycycline course as ordered              History of Present Illness   60 y/o male with follow-up for right lower extremity cellulitis  Brief history of recent right lower extremity swelling as follows:   3/2/25 he developed redness and swelling in the right lower extremity thigh and calf along with fevers and night sweats  Patient went to urgent care regarding the same and was started on cephalexin  His fevers continued which prompted him to go to the ED the following day at which time he was given Rocephin IV x 1   CT scan of the right lower extremity revealed \"Moderate diffuse nonspecific subcutaneous edema, without evidence of an organized fluid collection or soft tissue emphysema.\"  And Doppler ultrasound of the right lower extremity was negative for acute DVT.  Additional findings include WBC of 11.3.  Patient was discharged to home and was to continue his course of cephalexin for 7 days.  Pt seen by myself in office on 3/12/25 and reported the swelling seemed somewhat improved but the redness was persisting.     At that time based on my examination I felt this was erythema migrans rash large extending over the right lower extremity  Doxycycline was ordered and patient started that same evening  Patient was advised to go for blood work on 3/14/2025 this was done  Blood cultures to date are negative x 2  WBCs improved from ED from " 11.3-8  Lyme antibody positive    Patient is seen on examination today for 1 week follow-up  The redness and swelling has greatly improved, patient reports in the morning it is basically back to normal however as the day wears on and he is on his feet at work it does still swell somewhat  He denies fevers, chills, joint pain, night sweats, headaches    I advised him to continue the doxycycline for 2 more weeks as previously ordered    He is also given his B12 injection today        Review of Systems   Constitutional:  Negative for activity change, appetite change, chills, diaphoresis and fever.   HENT:  Negative for congestion.    Respiratory:  Negative for cough and shortness of breath.    Cardiovascular:  Positive for leg swelling. Negative for chest pain and palpitations.   Gastrointestinal:  Negative for abdominal pain, constipation, diarrhea and nausea.   Musculoskeletal:  Positive for arthralgias and joint swelling.   Skin:  Positive for rash.   Neurological:  Negative for dizziness, light-headedness and headaches.   Psychiatric/Behavioral:  Negative for sleep disturbance. The patient is not nervous/anxious.        Objective   /84 (BP Location: Left arm, Patient Position: Sitting, Cuff Size: Large)   Pulse 89   Temp 98.8 °F (37.1 °C)   Ht 6' (1.829 m)   Wt 134 kg (296 lb)   SpO2 98%   BMI 40.14 kg/m²      Physical Exam  Vitals reviewed.   Constitutional:       General: He is not in acute distress.  HENT:      Head: Normocephalic.      Nose: Nose normal.   Cardiovascular:      Rate and Rhythm: Normal rate and regular rhythm.   Pulmonary:      Effort: Pulmonary effort is normal. No respiratory distress.      Breath sounds: Normal breath sounds. No wheezing.   Musculoskeletal:         General: Swelling present.      Right lower leg: Edema (much improved, slight swelling still present over quad) present.   Skin:     Findings: No bruising, erythema or rash.      Comments: Vitiligo present on LE and UE    Neurological:      Mental Status: He is alert.   Psychiatric:         Mood and Affect: Mood normal.

## 2025-04-21 ENCOUNTER — CLINICAL SUPPORT (OUTPATIENT)
Dept: INTERNAL MEDICINE CLINIC | Age: 60
End: 2025-04-21
Payer: COMMERCIAL

## 2025-04-21 DIAGNOSIS — E53.8 VITAMIN B12 DEFICIENCY: Primary | ICD-10-CM

## 2025-04-21 PROCEDURE — 96372 THER/PROPH/DIAG INJ SC/IM: CPT

## 2025-04-21 RX ADMIN — CYANOCOBALAMIN 1000 MCG: 1000 INJECTION, SOLUTION INTRAMUSCULAR; SUBCUTANEOUS at 16:06

## 2025-05-05 ENCOUNTER — HOSPITAL ENCOUNTER (INPATIENT)
Facility: HOSPITAL | Age: 60
LOS: 3 days | Discharge: HOME | DRG: 872 | End: 2025-05-08
Attending: EMERGENCY MEDICINE | Admitting: INTERNAL MEDICINE
Payer: COMMERCIAL

## 2025-05-05 ENCOUNTER — APPOINTMENT (INPATIENT)
Dept: CT IMAGING | Facility: HOSPITAL | Age: 60
DRG: 872 | End: 2025-05-05
Payer: COMMERCIAL

## 2025-05-05 DIAGNOSIS — L03.90 CELLULITIS: ICD-10-CM

## 2025-05-05 DIAGNOSIS — A41.9 SEPSIS (HCC): Primary | ICD-10-CM

## 2025-05-05 DIAGNOSIS — A41.9 SEPSIS DUE TO CELLULITIS (HCC): ICD-10-CM

## 2025-05-05 DIAGNOSIS — L03.90 SEPSIS DUE TO CELLULITIS (HCC): ICD-10-CM

## 2025-05-05 LAB
ALBUMIN SERPL BCG-MCNC: 4.2 G/DL (ref 3.5–5)
ALP SERPL-CCNC: 60 U/L (ref 34–104)
ALT SERPL W P-5'-P-CCNC: 13 U/L (ref 7–52)
ANION GAP SERPL CALCULATED.3IONS-SCNC: 7 MMOL/L (ref 4–13)
ANISOCYTOSIS BLD QL SMEAR: PRESENT
APTT PPP: 28 SECONDS (ref 23–34)
AST SERPL W P-5'-P-CCNC: 13 U/L (ref 13–39)
B BURGDOR IGG+IGM SER QL IA: NEGATIVE
BASOPHILS # BLD MANUAL: 0 THOUSAND/UL (ref 0–0.1)
BASOPHILS NFR MAR MANUAL: 0 % (ref 0–1)
BILIRUB SERPL-MCNC: 0.56 MG/DL (ref 0.2–1)
BUN SERPL-MCNC: 16 MG/DL (ref 5–25)
CALCIUM SERPL-MCNC: 9.4 MG/DL (ref 8.4–10.2)
CHLORIDE SERPL-SCNC: 99 MMOL/L (ref 96–108)
CO2 SERPL-SCNC: 30 MMOL/L (ref 21–32)
CREAT SERPL-MCNC: 0.89 MG/DL (ref 0.6–1.3)
EOSINOPHIL # BLD MANUAL: 0 THOUSAND/UL (ref 0–0.4)
EOSINOPHIL NFR BLD MANUAL: 0 % (ref 0–6)
ERYTHROCYTE [DISTWIDTH] IN BLOOD BY AUTOMATED COUNT: 16.3 % (ref 11.6–15.1)
GFR SERPL CREATININE-BSD FRML MDRD: 94 ML/MIN/1.73SQ M
GLUCOSE SERPL-MCNC: 135 MG/DL (ref 65–140)
HCT VFR BLD AUTO: 44.1 % (ref 36.5–49.3)
HGB BLD-MCNC: 13.4 G/DL (ref 12–17)
INR PPP: 0.96 (ref 0.85–1.19)
LACTATE SERPL-SCNC: 2 MMOL/L (ref 0.5–2)
LACTATE SERPL-SCNC: 2.1 MMOL/L (ref 0.5–2)
LYMPHOCYTES # BLD AUTO: 0.56 THOUSAND/UL (ref 0.6–4.47)
LYMPHOCYTES # BLD AUTO: 4 % (ref 14–44)
MCH RBC QN AUTO: 25.6 PG (ref 26.8–34.3)
MCHC RBC AUTO-ENTMCNC: 30.4 G/DL (ref 31.4–37.4)
MCV RBC AUTO: 84 FL (ref 82–98)
MICROCYTES BLD QL AUTO: PRESENT
MONOCYTES # BLD AUTO: 0.14 THOUSAND/UL (ref 0–1.22)
MONOCYTES NFR BLD: 1 % (ref 4–12)
NEUTROPHILS # BLD MANUAL: 13.33 THOUSAND/UL (ref 1.85–7.62)
NEUTS BAND NFR BLD MANUAL: 6 % (ref 0–8)
NEUTS SEG NFR BLD AUTO: 89 % (ref 43–75)
OVALOCYTES BLD QL SMEAR: PRESENT
PLATELET # BLD AUTO: 169 THOUSANDS/UL (ref 149–390)
PLATELET BLD QL SMEAR: ADEQUATE
PMV BLD AUTO: 9.6 FL (ref 8.9–12.7)
POIKILOCYTOSIS BLD QL SMEAR: PRESENT
POTASSIUM SERPL-SCNC: 4.3 MMOL/L (ref 3.5–5.3)
PROCALCITONIN SERPL-MCNC: 0.91 NG/ML
PROT SERPL-MCNC: 8 G/DL (ref 6.4–8.4)
PROTHROMBIN TIME: 13.3 SECONDS (ref 12.3–15)
RBC # BLD AUTO: 5.24 MILLION/UL (ref 3.88–5.62)
RBC MORPH BLD: PRESENT
SODIUM SERPL-SCNC: 136 MMOL/L (ref 135–147)
WBC # BLD AUTO: 14.03 THOUSAND/UL (ref 4.31–10.16)

## 2025-05-05 PROCEDURE — 85007 BL SMEAR W/DIFF WBC COUNT: CPT | Performed by: EMERGENCY MEDICINE

## 2025-05-05 PROCEDURE — 85027 COMPLETE CBC AUTOMATED: CPT | Performed by: EMERGENCY MEDICINE

## 2025-05-05 PROCEDURE — 83605 ASSAY OF LACTIC ACID: CPT | Performed by: EMERGENCY MEDICINE

## 2025-05-05 PROCEDURE — 73701 CT LOWER EXTREMITY W/DYE: CPT

## 2025-05-05 PROCEDURE — 99285 EMERGENCY DEPT VISIT HI MDM: CPT | Performed by: EMERGENCY MEDICINE

## 2025-05-05 PROCEDURE — 36415 COLL VENOUS BLD VENIPUNCTURE: CPT | Performed by: EMERGENCY MEDICINE

## 2025-05-05 PROCEDURE — 86618 LYME DISEASE ANTIBODY: CPT | Performed by: PHYSICIAN ASSISTANT

## 2025-05-05 PROCEDURE — 99285 EMERGENCY DEPT VISIT HI MDM: CPT

## 2025-05-05 PROCEDURE — 99223 1ST HOSP IP/OBS HIGH 75: CPT | Performed by: HOSPITALIST

## 2025-05-05 PROCEDURE — 80053 COMPREHEN METABOLIC PANEL: CPT | Performed by: EMERGENCY MEDICINE

## 2025-05-05 PROCEDURE — 96365 THER/PROPH/DIAG IV INF INIT: CPT

## 2025-05-05 PROCEDURE — 87040 BLOOD CULTURE FOR BACTERIA: CPT | Performed by: EMERGENCY MEDICINE

## 2025-05-05 PROCEDURE — 84145 PROCALCITONIN (PCT): CPT | Performed by: EMERGENCY MEDICINE

## 2025-05-05 PROCEDURE — 85610 PROTHROMBIN TIME: CPT | Performed by: EMERGENCY MEDICINE

## 2025-05-05 PROCEDURE — 85730 THROMBOPLASTIN TIME PARTIAL: CPT | Performed by: EMERGENCY MEDICINE

## 2025-05-05 RX ORDER — IBUPROFEN 400 MG/1
400 TABLET, FILM COATED ORAL ONCE
Status: COMPLETED | OUTPATIENT
Start: 2025-05-05 | End: 2025-05-05

## 2025-05-05 RX ORDER — ACETAMINOPHEN 325 MG/1
975 TABLET ORAL ONCE
Status: DISCONTINUED | OUTPATIENT
Start: 2025-05-05 | End: 2025-05-08 | Stop reason: HOSPADM

## 2025-05-05 RX ORDER — SODIUM CHLORIDE, SODIUM GLUCONATE, SODIUM ACETATE, POTASSIUM CHLORIDE, MAGNESIUM CHLORIDE, SODIUM PHOSPHATE, DIBASIC, AND POTASSIUM PHOSPHATE .53; .5; .37; .037; .03; .012; .00082 G/100ML; G/100ML; G/100ML; G/100ML; G/100ML; G/100ML; G/100ML
75 INJECTION, SOLUTION INTRAVENOUS CONTINUOUS
Status: DISCONTINUED | OUTPATIENT
Start: 2025-05-05 | End: 2025-05-06

## 2025-05-05 RX ORDER — FLUTICASONE FUROATE AND VILANTEROL 200; 25 UG/1; UG/1
1 POWDER RESPIRATORY (INHALATION)
Status: DISCONTINUED | OUTPATIENT
Start: 2025-05-05 | End: 2025-05-08 | Stop reason: HOSPADM

## 2025-05-05 RX ORDER — LOSARTAN POTASSIUM 50 MG/1
100 TABLET ORAL DAILY
Status: DISCONTINUED | OUTPATIENT
Start: 2025-05-05 | End: 2025-05-08 | Stop reason: HOSPADM

## 2025-05-05 RX ORDER — ENOXAPARIN SODIUM 100 MG/ML
40 INJECTION SUBCUTANEOUS 2 TIMES DAILY
Status: DISCONTINUED | OUTPATIENT
Start: 2025-05-05 | End: 2025-05-08 | Stop reason: HOSPADM

## 2025-05-05 RX ORDER — ATORVASTATIN CALCIUM 20 MG/1
20 TABLET, FILM COATED ORAL
Status: DISCONTINUED | OUTPATIENT
Start: 2025-05-05 | End: 2025-05-08 | Stop reason: HOSPADM

## 2025-05-05 RX ORDER — CEFAZOLIN SODIUM 2 G/50ML
2000 SOLUTION INTRAVENOUS EVERY 8 HOURS
Status: DISCONTINUED | OUTPATIENT
Start: 2025-05-05 | End: 2025-05-08

## 2025-05-05 RX ORDER — LEVOTHYROXINE SODIUM 75 UG/1
75 TABLET ORAL
Status: DISCONTINUED | OUTPATIENT
Start: 2025-05-05 | End: 2025-05-08 | Stop reason: HOSPADM

## 2025-05-05 RX ORDER — LEVOTHYROXINE SODIUM 100 UG/1
200 TABLET ORAL
Status: DISCONTINUED | OUTPATIENT
Start: 2025-05-05 | End: 2025-05-08 | Stop reason: HOSPADM

## 2025-05-05 RX ORDER — ONDANSETRON 2 MG/ML
4 INJECTION INTRAMUSCULAR; INTRAVENOUS EVERY 6 HOURS PRN
Status: DISCONTINUED | OUTPATIENT
Start: 2025-05-05 | End: 2025-05-08 | Stop reason: HOSPADM

## 2025-05-05 RX ORDER — ACETAMINOPHEN 325 MG/1
650 TABLET ORAL EVERY 4 HOURS PRN
Status: DISCONTINUED | OUTPATIENT
Start: 2025-05-05 | End: 2025-05-08 | Stop reason: HOSPADM

## 2025-05-05 RX ADMIN — SODIUM CHLORIDE 1000 ML: 0.9 INJECTION, SOLUTION INTRAVENOUS at 04:27

## 2025-05-05 RX ADMIN — ENOXAPARIN SODIUM 40 MG: 40 INJECTION SUBCUTANEOUS at 18:08

## 2025-05-05 RX ADMIN — ENOXAPARIN SODIUM 40 MG: 40 INJECTION SUBCUTANEOUS at 08:22

## 2025-05-05 RX ADMIN — SODIUM CHLORIDE 1000 ML: 0.9 INJECTION, SOLUTION INTRAVENOUS at 05:58

## 2025-05-05 RX ADMIN — SODIUM CHLORIDE, SODIUM GLUCONATE, SODIUM ACETATE, POTASSIUM CHLORIDE, MAGNESIUM CHLORIDE, SODIUM PHOSPHATE, DIBASIC, AND POTASSIUM PHOSPHATE 75 ML/HR: .53; .5; .37; .037; .03; .012; .00082 INJECTION, SOLUTION INTRAVENOUS at 23:32

## 2025-05-05 RX ADMIN — ATORVASTATIN CALCIUM 20 MG: 20 TABLET, FILM COATED ORAL at 16:13

## 2025-05-05 RX ADMIN — CEFTRIAXONE SODIUM 2000 MG: 10 INJECTION, POWDER, FOR SOLUTION INTRAVENOUS at 04:27

## 2025-05-05 RX ADMIN — FLUTICASONE FUROATE AND VILANTEROL TRIFENATATE 1 PUFF: 200; 25 POWDER RESPIRATORY (INHALATION) at 08:22

## 2025-05-05 RX ADMIN — LEVOTHYROXINE SODIUM 200 MCG: 100 TABLET ORAL at 07:29

## 2025-05-05 RX ADMIN — IBUPROFEN 400 MG: 400 TABLET, FILM COATED ORAL at 18:21

## 2025-05-05 RX ADMIN — SODIUM CHLORIDE, SODIUM GLUCONATE, SODIUM ACETATE, POTASSIUM CHLORIDE, MAGNESIUM CHLORIDE, SODIUM PHOSPHATE, DIBASIC, AND POTASSIUM PHOSPHATE 75 ML/HR: .53; .5; .37; .037; .03; .012; .00082 INJECTION, SOLUTION INTRAVENOUS at 10:37

## 2025-05-05 RX ADMIN — ACETAMINOPHEN 650 MG: 325 TABLET ORAL at 15:05

## 2025-05-05 RX ADMIN — IOHEXOL 100 ML: 350 INJECTION, SOLUTION INTRAVENOUS at 09:33

## 2025-05-05 RX ADMIN — LEVOTHYROXINE SODIUM 75 MCG: 75 TABLET ORAL at 07:29

## 2025-05-05 RX ADMIN — SODIUM CHLORIDE 1000 ML: 0.9 INJECTION, SOLUTION INTRAVENOUS at 04:57

## 2025-05-05 RX ADMIN — CEFAZOLIN SODIUM 2000 MG: 2 SOLUTION INTRAVENOUS at 16:14

## 2025-05-05 RX ADMIN — LOSARTAN POTASSIUM 100 MG: 50 TABLET, FILM COATED ORAL at 08:22

## 2025-05-05 NOTE — PLAN OF CARE
Problem: PAIN - ADULT  Goal: Verbalizes/displays adequate comfort level or baseline comfort level  Description: Interventions:- Encourage patient to monitor pain and request assistance- Assess pain using appropriate pain scale- Administer analgesics based on type and severity of pain and evaluate response- Implement non-pharmacological measures as appropriate and evaluate response- Consider cultural and social influences on pain and pain management- Notify physician/advanced practitioner if interventions unsuccessful or patient reports new pain  Outcome: Progressing     Problem: INFECTION - ADULT  Goal: Absence or prevention of progression during hospitalization  Description: INTERVENTIONS:- Assess and monitor for signs and symptoms of infection- Monitor lab/diagnostic results- Monitor all insertion sites, i.e. indwelling lines, tubes, and drains- Monitor endotracheal if appropriate and nasal secretions for changes in amount and color- [place] appropriate cooling/warming therapies per order- Administer medications as ordered- Instruct and encourage patient and family to use good hand hygiene technique- Identify and instruct in appropriate isolation precautions for identified infection/condition  Outcome: Progressing     Problem: Knowledge Deficit  Goal: Patient/family/caregiver demonstrates understanding of disease process, treatment plan, medications, and discharge instructions  Description: Complete learning assessment and assess knowledge base.Interventions:- Provide teaching at level of understanding- Provide teaching via preferred learning methods  Outcome: Progressing

## 2025-05-05 NOTE — ASSESSMENT & PLAN NOTE
Patient with fever, body aches, rash on right thigh  Sepsis noted by tachycardia, leukocytosis, lactic acidosis  IV abx  Serial exam  Follow up blood cultures

## 2025-05-05 NOTE — ED PROVIDER NOTES
Time reflects when diagnosis was documented in both MDM as applicable and the Disposition within this note       Time User Action Codes Description Comment    5/5/2025  4:44 AM Nick Narayanan Add [A41.9] Sepsis (HCC)     5/5/2025  4:44 AM Nick Narayanan Add [L03.90] Cellulitis     5/5/2025  6:08 PM Augustin Almeida Add [L03.90,  A41.9] Sepsis due to cellulitis (HCC)           ED Disposition       ED Disposition   Admit    Condition   Stable    Date/Time   Mon May 5, 2025  4:53 AM    Comment   Case was discussed with Hospitalist and the patient's admission status was agreed to be Admission Status: inpatient status to the service of Dr. Lyles .               Assessment & Plan       Medical Decision Making  Patient with fever, ddx includes but not limited to: viral syndrome (flu, covid, RSV), URI, bronchitis, PNA.    Patient has presented to the Emergency Department with exacerbation of chronic condition / pt with new illness-injury that may poses a threat to life or body function.  At least 3 different tests have been ordered on this patient AND reviewed the results.   Old laboratory data (of at least 2 tests) were reviewed from the medical records and compared to today's results  Discussion with patient and/or family members of results (normal and abnormal) and the implications for immediate and long term treatment/management.  Due to the high risk of morbidity further diagnostic testing and treatment is necessary.    9:26 PM  I discussed the case with the hospitalist. We reviewed the HPI, pertinent PMH, ED course and management.   Hospitalist agreed with plan and will admit the patient to the hospital.    Medications  acetaminophen (TYLENOL) tablet 975 mg ( Oral Unheld by provider 5/5/25 8649)   acetaminophen (TYLENOL) tablet 650 mg (650 mg Oral Given 5/5/25 3248)  ondansetron (ZOFRAN) injection 4 mg (has no administration in time range)  enoxaparin (LOVENOX) subcutaneous injection 40 mg (40 mg Subcutaneous Given  5/5/25 1808)  levothyroxine tablet 75 mcg (75 mcg Oral Given 5/5/25 0729)  levothyroxine tablet 200 mcg (200 mcg Oral Given 5/5/25 0729)  losartan (COZAAR) tablet 100 mg (100 mg Oral Given 5/5/25 0822)  fluticasone-vilanterol 200-25 mcg/actuation 1 puff (1 puff Inhalation Given 5/5/25 0822)  atorvastatin (LIPITOR) tablet 20 mg (20 mg Oral Given 5/5/25 1613)  multi-electrolyte (Plasmalyte-A/Isolyte-S PH 7.4/Normosol-R) IV solution (75 mL/hr Intravenous Rate/Dose Verify 5/5/25 1730)  ceFAZolin (ANCEF) IVPB (premix in dextrose) 2,000 mg 50 mL (0 mg Intravenous Stopped 5/5/25 1650)  sodium chloride 0.9 % bolus 1,000 mL (0 mL Intravenous Stopped 5/5/25 0457)    Followed by  sodium chloride 0.9 % bolus 1,000 mL (1,000 mL Intravenous New Bag 5/5/25 0457)    Followed by  sodium chloride 0.9 % bolus 1,000 mL (1,000 mL Intravenous New Bag 5/5/25 0558)  ceftriaxone (ROCEPHIN) 2 g/50 mL in dextrose IVPB (0 mg Intravenous Stopped 5/5/25 0457)  iohexol (OMNIPAQUE) 350 MG/ML injection (MULTI-DOSE) 100 mL (100 mL Intravenous Given 5/5/25 0933)  ibuprofen (MOTRIN) tablet 400 mg (400 mg Oral Given 5/5/25 1821)                Problems Addressed:  Cellulitis: acute illness or injury  Sepsis (HCC): acute illness or injury    Amount and/or Complexity of Data Reviewed  Labs: ordered. Decision-making details documented in ED Course.    Risk  OTC drugs.  Decision regarding hospitalization.        ED Course as of 05/05/25 2126   Mon May 05, 2025   0438 WBC(!): 14.03   0449 LACTIC ACID(!): 2.1       Medications   acetaminophen (TYLENOL) tablet 975 mg (0 mg Oral Hold 5/5/25 0430)   acetaminophen (TYLENOL) tablet 650 mg (has no administration in time range)   ondansetron (ZOFRAN) injection 4 mg (has no administration in time range)   enoxaparin (LOVENOX) subcutaneous injection 40 mg (has no administration in time range)   sodium chloride 0.9 % bolus 1,000 mL (0 mL Intravenous Stopped 5/5/25 9166)     Followed by   sodium chloride 0.9 % bolus  1,000 mL (1,000 mL Intravenous New Bag 25 0457)     Followed by   sodium chloride 0.9 % bolus 1,000 mL (has no administration in time range)   ceftriaxone (ROCEPHIN) 2 g/50 mL in dextrose IVPB (0 mg Intravenous Stopped 25)       ED Risk Strat Scores                    No data recorded                            History of Present Illness       Chief Complaint   Patient presents with    Fever     Fever, body aches, rash on leg; states he thinks his lymes disease is coming back, no sick contacts       Past Medical History:   Diagnosis Date    Asthma     Disease of thyroid gland     Hematoma of left axilla     Last Assessed:2015    Hypertension     Hyperthyroidism     Hypothyroidism     Toxic multinodular goiter     Last Assessed:2013      Past Surgical History:   Procedure Laterality Date    KNEE ARTHROSCOPY Left     THYROID SURGERY      Ablation      Family History   Problem Relation Age of Onset    Diabetes Mother         mellitus    Parkinsonism Father     Hypothyroidism Sister     Cancer Maternal Grandmother     Diabetes Maternal Aunt     Diabetes Maternal Uncle     Heart disease Neg Hx     Stroke Neg Hx       Social History     Tobacco Use    Smoking status: Former     Current packs/day: 0.00     Average packs/day: 0.8 packs/day for 15.0 years (11.3 ttl pk-yrs)     Types: Cigarettes     Start date: 1992     Quit date: 2007     Years since quittin.3    Smokeless tobacco: Never   Vaping Use    Vaping status: Never Used   Substance Use Topics    Alcohol use: Yes     Alcohol/week: 2.0 standard drinks of alcohol     Types: 2 Cans of beer per week     Comment: Social drinker     Drug use: No      E-Cigarette/Vaping    E-Cigarette Use Never User       E-Cigarette/Vaping Substances    Nicotine No     THC No     CBD No     Flavoring No     Other No     Unknown No       I have reviewed and agree with the history as documented.     Haja Limaopp is a 59 y.o.  year old male  Past Medical  History:  No date: Asthma  No date: Disease of thyroid gland  No date: Hematoma of left axilla      Comment:  Last Assessed:2015  No date: Hypertension  No date: Hyperthyroidism  No date: Hypothyroidism  No date: Toxic multinodular goiter      Comment:  Last Assessed:2013  Social History    Tobacco Use      Smoking status: Former        Packs/day: 0.00        Years: 0.8 packs/day for 15.0 years (11.3 ttl pk-yrs)        Types: Cigarettes        Start date: 1992        Quit date: 2007        Years since quittin.3      Smokeless tobacco: Never    Vaping Use      Vaping status: Never Used    Alcohol use: Yes      Alcohol/week: 2.0 standard drinks of alcohol      Types: 2 Cans of beer per week      Comment: Social drinker     Drug use: No    Patient presents with:  Fever: Fever, body aches, rash on leg; states he thinks his lymes disease is coming back, no sick contacts  Felt the same as last time (1.5 months ago)  Has a warm, sore rash RLE   Starts on the thigh and goes to the knee area  + fever feeling like chills  Onset just at 1am today                    History provided by:  Patient   used: No    Fever  Associated symptoms: fever and rash    Associated symptoms: no abdominal pain, no chest pain, no cough, no ear pain, no shortness of breath, no sore throat and no vomiting        Review of Systems   Constitutional:  Positive for fever. Negative for chills.   HENT:  Negative for ear pain and sore throat.    Eyes:  Negative for pain and visual disturbance.   Respiratory:  Negative for cough and shortness of breath.    Cardiovascular:  Negative for chest pain and palpitations.   Gastrointestinal:  Negative for abdominal pain and vomiting.   Genitourinary:  Negative for dysuria and hematuria.   Musculoskeletal:  Negative for arthralgias and back pain.   Skin:  Positive for color change and rash.   Neurological:  Negative for seizures and syncope.   All other systems reviewed  and are negative.          Objective       ED Triage Vitals   Temperature Pulse Blood Pressure Respirations SpO2 Patient Position - Orthostatic VS   05/05/25 0412 05/05/25 0412 05/05/25 0412 05/05/25 0412 05/05/25 0412 05/05/25 0433   100.5 °F (38.1 °C) (!) 115 162/74 22 92 % Sitting      Temp Source Heart Rate Source BP Location FiO2 (%) Pain Score    05/05/25 0513 05/05/25 0412 05/05/25 0433 -- 05/05/25 0412    Temporal Monitor Left arm  4      Vitals      Date and Time Temp Pulse SpO2 Resp BP Pain Score FACES Pain Rating User   05/05/25 2119 99.9 °F (37.7 °C) 92 96 % -- -- -- -- DII   05/05/25 1910 98.6 °F (37 °C) -- -- -- -- -- -- MV   05/05/25 1821 -- -- -- -- -- Med Not Given for Pain - for MAR use only -- MV   05/05/25 1810 102.7 °F (39.3 °C) Ice under arms and groin -- -- -- -- -- -- MV   05/05/25 1605 101.1 °F (38.4 °C) -- -- -- -- -- -- MV   05/05/25 1505 -- -- -- -- -- Med Not Given for Pain - for MAR use only -- MV   05/05/25 1459 102.9 °F (39.4 °C) RN notified 109 -- -- -- -- -- YR   05/05/25 1459 -- -- -- 18 153/89 -- -- DII   05/05/25 0815 -- -- -- -- -- No Pain -- MV   05/05/25 0730 -- 107 94 % -- 148/79 -- -- KH   05/05/25 0715 -- 99 93 % -- 150/62 -- -- KH   05/05/25 0715 97.8 °F (36.6 °C) -- -- 18 -- -- -- YR   05/05/25 0700 -- 108 94 % -- 185/68 -- -- KH   05/05/25 0645 -- 106 94 % -- 156/64 -- -- KH   05/05/25 0637 -- -- 94 % -- -- -- -- KH   05/05/25 0630 -- 99 93 % -- 125/79 -- -- KH   05/05/25 0615 -- 100 95 % -- 141/76 -- -- KH   05/05/25 0600 -- 103 95 % -- 143/80 -- -- KH   05/05/25 0550 -- 98 96 % -- -- -- -- DII   05/05/25 0548 100 °F (37.8 °C) 95 95 % 19 143/83 -- -- DII   05/05/25 0524 -- -- -- -- -- No Pain -- KH   05/05/25 0513 99.8 °F (37.7 °C) 99 90 % 18 128/60 -- -- KB   05/05/25 0501 -- 109 91 % 20 144/65 -- -- KB   05/05/25 0433 -- 110 93 % 18 163/71 -- -- KB   05/05/25 0430 -- 108 94 % -- -- -- -- KB   05/05/25 0412 100.5 °F (38.1 °C) 115 92 % 22 162/74 4 -- AF             Physical Exam  Vitals and nursing note reviewed.   Constitutional:       General: He is not in acute distress.     Appearance: Normal appearance. He is well-developed. He is obese.   HENT:      Head: Normocephalic and atraumatic.      Right Ear: External ear normal.      Left Ear: External ear normal.   Eyes:      Conjunctiva/sclera: Conjunctivae normal.   Cardiovascular:      Rate and Rhythm: Normal rate and regular rhythm.      Heart sounds: No murmur heard.  Pulmonary:      Effort: Pulmonary effort is normal. No respiratory distress.      Breath sounds: Normal breath sounds.   Abdominal:      Palpations: Abdomen is soft.      Tenderness: There is no abdominal tenderness.   Musculoskeletal:         General: No swelling.      Cervical back: Neck supple.   Skin:     General: Skin is warm and dry.      Capillary Refill: Capillary refill takes less than 2 seconds.      Comments: Warm read, tender area on medial R thigh    Neurological:      General: No focal deficit present.      Mental Status: He is alert and oriented to person, place, and time.   Psychiatric:         Mood and Affect: Mood normal.         Results Reviewed       Procedure Component Value Units Date/Time    Blood culture #1 [[id number]] Collected: 05/05/25 0426    Lab Status: Preliminary result Specimen: Blood from Arm, Left Updated: 05/05/25 1102     Blood Culture Received in Microbiology Lab. Culture in Progress.    Blood culture #2 [[id number]] Collected: 05/05/25 0426    Lab Status: Preliminary result Specimen: Blood from Arm, Right Updated: 05/05/25 1001     Blood Culture Received in Microbiology Lab. Culture in Progress.    Lactic acid 2 Hours [[id number]]  (Normal) Collected: 05/05/25 0652    Lab Status: Final result Specimen: Blood from Arm, Left Updated: 05/05/25 0720     LACTIC ACID 2.0 mmol/L     Narrative:      Result may be elevated if tourniquet was used during collection.    RBC Morphology Reflex Test [[id number]] Collected: 05/05/25  0426    Lab Status: Final result Specimen: Blood from Arm, Right Updated: 05/05/25 0601    CBC and differential [[id number]]  (Abnormal) Collected: 05/05/25 0426    Lab Status: Final result Specimen: Blood from Arm, Right Updated: 05/05/25 0506     WBC 14.03 Thousand/uL      RBC 5.24 Million/uL      Hemoglobin 13.4 g/dL      Hematocrit 44.1 %      MCV 84 fL      MCH 25.6 pg      MCHC 30.4 g/dL      RDW 16.3 %      MPV 9.6 fL      Platelets 169 Thousands/uL     Narrative:      This is an appended report.  These results have been appended to a previously verified report.    Manual Differential(PHLEBS Do Not Order) [[id number]]  (Abnormal) Collected: 05/05/25 0426    Lab Status: Final result Specimen: Blood from Arm, Right Updated: 05/05/25 0506     Segmented % 89 %      Bands % 6 %      Lymphocytes % 4 %      Monocytes % 1 %      Eosinophils % 0 %      Basophils % 0 %      Absolute Neutrophils 13.33 Thousand/uL      Absolute Lymphocytes 0.56 Thousand/uL      Absolute Monocytes 0.14 Thousand/uL      Absolute Eosinophils 0.00 Thousand/uL      Absolute Basophils 0.00 Thousand/uL      Total Counted --     RBC Morphology Present     Platelet Estimate Adequate     Anisocytosis Present     Microcytes Present     Ovalocytes Present     Poikilocytes Present    Procalcitonin [[id number]]  (Abnormal) Collected: 05/05/25 0426    Lab Status: Final result Specimen: Blood from Arm, Right Updated: 05/05/25 0456     Procalcitonin 0.91 ng/ml     Comprehensive metabolic panel [[id number]] Collected: 05/05/25 0426    Lab Status: Final result Specimen: Blood from Arm, Right Updated: 05/05/25 0446     Sodium 136 mmol/L      Potassium 4.3 mmol/L      Chloride 99 mmol/L      CO2 30 mmol/L      ANION GAP 7 mmol/L      BUN 16 mg/dL      Creatinine 0.89 mg/dL      Glucose 135 mg/dL      Calcium 9.4 mg/dL      AST 13 U/L      ALT 13 U/L      Alkaline Phosphatase 60 U/L      Total Protein 8.0 g/dL      Albumin 4.2 g/dL      Total Bilirubin 0.56 mg/dL       eGFR 94 ml/min/1.73sq m     Narrative:      [employer] guidelines for Chronic Kidney Disease (CKD):     Stage 1 with normal or high GFR (GFR > 90 mL/min/1.73 square meters)    Stage 2 Mild CKD (GFR = 60-89 mL/min/1.73 square meters)    Stage 3A Moderate CKD (GFR = 45-59 mL/min/1.73 square meters)    Stage 3B Moderate CKD (GFR = 30-44 mL/min/1.73 square meters)    Stage 4 Severe CKD (GFR = 15-29 mL/min/1.73 square meters)    Stage 5 End Stage CKD (GFR <15 mL/min/1.73 square meters)  Note: GFR calculation is accurate only with a steady state creatinine    Lactic acid [[id number]]  (Abnormal) Collected: 05/05/25 0426    Lab Status: Final result Specimen: Blood from Arm, Right Updated: 05/05/25 0446     LACTIC ACID 2.1 mmol/L     Narrative:      Result may be elevated if tourniquet was used during collection.    Protime-INR [[id number]]  (Normal) Collected: 05/05/25 0426    Lab Status: Final result Specimen: Blood from Arm, Right Updated: 05/05/25 0443     Protime 13.3 seconds      INR 0.96    Narrative:      INR Therapeutic Range    Indication                                             INR Range      Atrial Fibrillation                                               2.0-3.0  Hypercoagulable State                                    2.0.2.3  Left Ventricular Asist Device                            2.0-3.0  Mechanical Heart Valve                                  -    Aortic(with afib, MI, embolism, HF, LA enlargement,    and/or coagulopathy)                                     2.0-3.0 (2.5-3.5)     Mitral                                                             2.5-3.5  Prosthetic/Bioprosthetic Heart Valve               2.0-3.0  Venous thromboembolism (VTE: VT, PE        2.0-3.0    APTT [[id number]]  (Normal) Collected: 05/05/25 0426    Lab Status: Final result Specimen: Blood from Arm, Right Updated: 05/05/25 0443     PTT 28 seconds             CT lower extremity w contrast right   Final  Interpretation by Tyrese Garcia MD (05/05 2923)      Diffuse nonspecific subcutaneous edema may represent cellulitis in the appropriate clinical setting.      No evidence of abscess.      No evidence of deep soft tissue infection.         Workstation performed: [id number]             Procedures    ED Medication and Procedure Management   Prior to Admission Medications   Prescriptions Last Dose Informant Patient Reported? Taking?   Fluticasone-Salmeterol (Advair Diskus) 250-50 mcg/dose inhaler 5/4/2025 Self No Yes   Sig: Inhale 1 puff 2 (two) times a day Rinse mouth after use.   Semaglutide-Weight Management (Wegovy) 0.5 MG/0.5ML Not Taking Self No No   Sig: Inject 0.5 mg under the skin weekly   Patient not taking: Reported on 5/5/2025   ammonium lactate (LAC-HYDRIN) 12 % lotion Not Taking Self No No   Sig: Apply topically 2 (two) times a day as needed for dry skin   Patient not taking: Reported on 5/5/2025   atorvastatin (LIPITOR) 20 mg tablet 5/4/2025 Self No Yes   Sig: take 1 tablet by mouth once daily   clobetasol (TEMOVATE) 0.05 % ointment Not Taking Self Yes No   Patient not taking: Reported on 3/20/2025   levothyroxine 200 mcg tablet 5/4/2025 Self No Yes   Sig: take 1 tablet by mouth once daily   levothyroxine 75 mcg tablet 5/4/2025 Self No Yes   Sig: take 1 tablet by mouth once daily   losartan (COZAAR) 100 MG tablet 5/4/2025 Self No Yes   Sig: Take 1 tablet (100 mg total) by mouth daily   metFORMIN (GLUCOPHAGE-XR) 500 mg 24 hr tablet 5/4/2025 Self No Yes   Sig: take 1 tablet by mouth once daily with dinner   tacrolimus (PROTOPIC) 0.1 % ointment Not Taking Self Yes No   Patient not taking: Reported on 3/12/2025   triamcinolone (KENALOG) 0.1 % ointment  Self No No   Sig: Apply topically 2 (two) times a day for 14 days   Patient not taking: Reported on 3/12/2025      Facility-Administered Medications Last Administration Doses Remaining   cyanocobalamin injection 1,000 mcg 4/21/2025  4:06 PM          Current Discharge Medication List        CONTINUE these medications which have NOT CHANGED    Details   atorvastatin (LIPITOR) 20 mg tablet take 1 tablet by mouth once daily  Qty: 90 tablet, Refills: 1    Associated Diagnoses: Hypercholesterolemia      Fluticasone-Salmeterol (Advair Diskus) 250-50 mcg/dose inhaler Inhale 1 puff 2 (two) times a day Rinse mouth after use.  Qty: 60 blister, Refills: 11    Comments: Substitution to a formulary equivalent within the same pharmaceutical class is authorized.  Associated Diagnoses: Mild intermittent asthma without complication      !! levothyroxine 200 mcg tablet take 1 tablet by mouth once daily  Qty: 90 tablet, Refills: 1    Associated Diagnoses: Hypothyroidism, unspecified type      !! levothyroxine 75 mcg tablet take 1 tablet by mouth once daily  Qty: 90 tablet, Refills: 1    Associated Diagnoses: Hypothyroidism, unspecified type      losartan (COZAAR) 100 MG tablet Take 1 tablet (100 mg total) by mouth daily  Qty: 90 tablet, Refills: 3    Associated Diagnoses: Essential hypertension, benign      metFORMIN (GLUCOPHAGE-XR) 500 mg 24 hr tablet take 1 tablet by mouth once daily with dinner  Qty: 90 tablet, Refills: 1    Associated Diagnoses: Prediabetes      ammonium lactate (LAC-HYDRIN) 12 % lotion Apply topically 2 (two) times a day as needed for dry skin  Qty: 400 g, Refills: 2    Associated Diagnoses: Xerosis of skin      clobetasol (TEMOVATE) 0.05 % ointment       Semaglutide-Weight Management (Wegovy) 0.5 MG/0.5ML Inject 0.5 mg under the skin weekly  Qty: 2 mL, Refills: 0    Associated Diagnoses: Obesity, Class III, BMI 40-49.9 (morbid obesity); Prediabetes      tacrolimus (PROTOPIC) 0.1 % ointment       triamcinolone (KENALOG) 0.1 % ointment Apply topically 2 (two) times a day for 14 days  Qty: 30 g, Refills: 2    Associated Diagnoses: Cellulitis of right lower extremity       !! - Potential duplicate medications found. Please discuss with provider.        No  discharge procedures on file.  ED SEPSIS DOCUMENTATION   Time reflects when diagnosis was documented in both MDM as applicable and the Disposition within this note       Time User Action Codes Description Comment    5/5/2025  4:44 AM Nick Narayanan [A41.9] Sepsis (HCC)     5/5/2025  4:44 AM Nick Narayanan [L03.90] Cellulitis     5/5/2025  6:08 PM Augustin Almeida Add [L03.90,  A41.9] Sepsis due to cellulitis (HCC)                  Nick Narayanan MD  05/05/25 2124

## 2025-05-05 NOTE — H&P
H&P - Hospitalist   Name: Haja Shen 59 y.o. male I MRN: [id number]  Unit/Bed#: -01 I Date of Admission: 5/5/2025   Date of Service: 5/5/2025 I Hospital Day: 0     Assessment & Plan  Sepsis due to cellulitis (HCC)  Patient with fever, body aches, rash on right thigh  Sepsis noted by tachycardia, leukocytosis, lactic acidosis  IV abx  Serial exam  Follow up blood cultures  Benign essential HTN  Continue home medication with hold parameters  HLD (hyperlipidemia)  Continue statin  Hypothyroid  Continue levothyroxine  Obesity, Class III, BMI 40-49.9 (morbid obesity)  BMI 40  Healthy diet and lifestyle modification  Asthma  No exacerbation  Continue home inhalers      VTE Pharmacologic Prophylaxis: VTE Score: 4 Moderate Risk (Score 3-4) - Pharmacological DVT Prophylaxis Ordered: enoxaparin (Lovenox).  Code Status: Level 1 - Full Code   Discussion with patient and wife at bedside    Anticipated Length of Stay: Patient will be admitted on an inpatient basis with an anticipated length of stay of greater than 2 midnights secondary to IV abx, follow up cultures.    History of Present Illness   Chief Complaint: fever, body aches, rash on thigh    Haja Shen is a 59 y.o. male with a PMH of Asthma, Morbid obesity, Hypothyroidism, HTN, HLD who presents with fever, body aches and rash on thigh. Patient reports hx of Lyme disease few months ago. Thought it is coming back, woke around 0130 am with fever, shaking chills. Noted rash on right thigh. No injury, scratches. Feels warm to touch    Review of Systems   Constitutional:  Positive for chills and fever. Negative for fatigue.   HENT:  Negative for rhinorrhea, sore throat and trouble swallowing.    Eyes:  Negative for discharge and redness.   Respiratory:  Negative for cough and shortness of breath.    Cardiovascular:  Negative for chest pain and leg swelling.   Gastrointestinal:  Negative for abdominal pain, diarrhea, nausea and vomiting.   Genitourinary:  Negative for  dysuria and hematuria.   Musculoskeletal:  Negative for back pain, myalgias and neck pain.   Skin:  Positive for color change. Negative for rash.   Neurological:  Positive for weakness. Negative for dizziness and headaches.   Psychiatric/Behavioral:  Negative for agitation and confusion.        Historical Information   Past Medical History:   Diagnosis Date    Asthma     Disease of thyroid gland     Hematoma of left axilla     Last Assessed:2015    Hypertension     Hyperthyroidism     Hypothyroidism     Toxic multinodular goiter     Last Assessed:2013     Past Surgical History:   Procedure Laterality Date    KNEE ARTHROSCOPY Left     THYROID SURGERY      Ablation     Social History     Tobacco Use    Smoking status: Former     Current packs/day: 0.00     Average packs/day: 0.8 packs/day for 15.0 years (11.3 ttl pk-yrs)     Types: Cigarettes     Start date: 1992     Quit date: 2007     Years since quittin.3    Smokeless tobacco: Never   Vaping Use    Vaping status: Never Used   Substance and Sexual Activity    Alcohol use: Yes     Alcohol/week: 2.0 standard drinks of alcohol     Types: 2 Cans of beer per week     Comment: Social drinker     Drug use: No    Sexual activity: Yes     Partners: Female     Birth control/protection: Female Sterilization     E-Cigarette/Vaping    E-Cigarette Use Never User      E-Cigarette/Vaping Substances    Nicotine No     THC No     CBD No     Flavoring No     Other No     Unknown No      Family History   Problem Relation Age of Onset    Diabetes Mother         mellitus    Parkinsonism Father     Hypothyroidism Sister     Cancer Maternal Grandmother     Diabetes Maternal Aunt     Diabetes Maternal Uncle     Heart disease Neg Hx     Stroke Neg Hx      Social History:  Marital Status: /Civil Union   Occupation: custom   Patient Pre-hospital Living Situation: With spouse  Patient Pre-hospital Level of Mobility: walks  Patient Pre-hospital Diet  Restrictions: none    Meds/Allergies   I have reviewed home medications with patient personally.  Prior to Admission medications    Medication Sig Start Date End Date Taking? Authorizing Provider   ammonium lactate (LAC-HYDRIN) 12 % lotion Apply topically 2 (two) times a day as needed for dry skin 11/3/23   Winter Villar PA-C   atorvastatin (LIPITOR) 20 mg tablet take 1 tablet by mouth once daily 3/7/25   Idalia Peñaloza MD   clobetasol (TEMOVATE) 0.05 % ointment  1/11/24   Historical Provider, MD   Fluticasone-Salmeterol (Advair Diskus) 250-50 mcg/dose inhaler Inhale 1 puff 2 (two) times a day Rinse mouth after use. 3/18/24   Idalia Peñaloza MD   levothyroxine 200 mcg tablet take 1 tablet by mouth once daily 3/7/25   Idalia Peñaloza MD   levothyroxine 75 mcg tablet take 1 tablet by mouth once daily 3/7/25   Idalia Peñaloza MD   losartan (COZAAR) 100 MG tablet Take 1 tablet (100 mg total) by mouth daily 9/24/24   Idalia Peñaloza MD   metFORMIN (GLUCOPHAGE-XR) 500 mg 24 hr tablet take 1 tablet by mouth once daily with dinner 3/7/25   Idalia Peñaloza MD   Semaglutide-Weight Management (Wegovy) 0.5 MG/0.5ML Inject 0.5 mg under the skin weekly  Patient not taking: Reported on 3/20/2025 1/28/25   Idalia Peñaloza MD   tacrolimus (PROTOPIC) 0.1 % ointment  1/15/24   Historical Provider, MD   triamcinolone (KENALOG) 0.1 % ointment Apply topically 2 (two) times a day for 14 days  Patient not taking: Reported on 3/12/2025 11/14/23 3/12/25  Winter Villar PA-C     Allergies   Allergen Reactions    Cat Dander      Eye irritation       Objective :  Temp:  [99.8 °F (37.7 °C)-100.5 °F (38.1 °C)] 100 °F (37.8 °C)  HR:  [] 98  BP: (128-163)/(60-83) 143/83  Resp:  [18-22] 19  SpO2:  [90 %-96 %] 96 %  O2 Device: None (Room air)    Physical Exam  Vitals and nursing note reviewed.   Constitutional:       Appearance: He is well-developed. He is morbidly obese.   HENT:      Head: Normocephalic and atraumatic.      Mouth/Throat:       Mouth: Mucous membranes are dry.      Pharynx: No oropharyngeal exudate.   Eyes:      General:         Right eye: No discharge.         Left eye: No discharge.      Conjunctiva/sclera: Conjunctivae normal.   Neck:      Trachea: No tracheal deviation.   Cardiovascular:      Rate and Rhythm: Normal rate and regular rhythm.      Heart sounds: Normal heart sounds. No murmur heard.     No friction rub. No gallop.   Pulmonary:      Effort: Pulmonary effort is normal. No respiratory distress.      Breath sounds: Wheezing present. No rales.   Chest:      Chest wall: No tenderness.   Abdominal:      General: Bowel sounds are normal. There is no distension.      Palpations: Abdomen is soft. There is no mass.      Tenderness: There is no abdominal tenderness. There is no guarding or rebound.   Musculoskeletal:         General: No tenderness or deformity. Normal range of motion.      Cervical back: Normal range of motion.      Right lower leg: Edema present.      Left lower leg: Edema present.   Skin:     General: Skin is warm and dry.      Coloration: Skin is not pale.      Findings: No erythema or rash.      Comments: Right thigh with warmth and erythema. +stasis changes lower extremties   Neurological:      General: No focal deficit present.      Mental Status: He is alert and oriented to person, place, and time. Mental status is at baseline.   Psychiatric:         Mood and Affect: Mood normal.         Behavior: Behavior normal.         Thought Content: Thought content normal.         Judgment: Judgment normal.        Lines/Drains:      Lab Results: I have reviewed the following results:  Results from last 7 days   Lab Units 05/05/25  0426   WBC Thousand/uL 14.03*   HEMOGLOBIN g/dL 13.4   HEMATOCRIT % 44.1   PLATELETS Thousands/uL 169   BANDS PCT % 6   LYMPHO PCT % 4*   MONO PCT % 1*   EOS PCT % 0     Results from last 7 days   Lab Units 05/05/25  0426   SODIUM mmol/L 136   POTASSIUM mmol/L 4.3   CHLORIDE mmol/L 99   CO2  mmol/L 30   BUN mg/dL 16   CREATININE mg/dL 0.89   ANION GAP mmol/L 7   CALCIUM mg/dL 9.4   ALBUMIN g/dL 4.2   TOTAL BILIRUBIN mg/dL 0.56   ALK PHOS U/L 60   ALT U/L 13   AST U/L 13   GLUCOSE RANDOM mg/dL 135     Results from last 7 days   Lab Units 05/05/25  0426   INR  0.96         Lab Results   Component Value Date    HGBA1C 6.1 (H) 01/25/2025    HGBA1C 6.0 (H) 09/21/2024    HGBA1C 6.3 (H) 06/01/2024     Results from last 7 days   Lab Units 05/05/25  0426   LACTIC ACID mmol/L 2.1*   PROCALCITONIN ng/ml 0.91*       Imaging Results Review: No pertinent imaging studies reviewed.  Other Study Results Review: No additional pertinent studies reviewed.    Administrative Statements   I have spent a total time of 75 minutes in caring for this patient on the day of the visit/encounter including Diagnostic results, Counseling / Coordination of care, Documenting in the medical record, Reviewing/placing orders in the medical record (including tests, medications, and/or procedures), and Obtaining or reviewing history  .    ** Please Note: This note has been constructed using a voice recognition system. **

## 2025-05-06 PROBLEM — A41.9 SEPSIS (HCC): Status: ACTIVE | Noted: 2025-05-06

## 2025-05-06 PROBLEM — I87.2 VENOUS INSUFFICIENCY OF BOTH LOWER EXTREMITIES: Status: ACTIVE | Noted: 2025-05-06

## 2025-05-06 PROBLEM — L03.115 CELLULITIS OF RIGHT LEG: Status: ACTIVE | Noted: 2025-05-06

## 2025-05-06 LAB
ANION GAP SERPL CALCULATED.3IONS-SCNC: 6 MMOL/L (ref 4–13)
BUN SERPL-MCNC: 17 MG/DL (ref 5–25)
CALCIUM SERPL-MCNC: 8 MG/DL (ref 8.4–10.2)
CHLORIDE SERPL-SCNC: 98 MMOL/L (ref 96–108)
CO2 SERPL-SCNC: 28 MMOL/L (ref 21–32)
CREAT SERPL-MCNC: 1.08 MG/DL (ref 0.6–1.3)
ERYTHROCYTE [DISTWIDTH] IN BLOOD BY AUTOMATED COUNT: 17.3 % (ref 11.6–15.1)
GFR SERPL CREATININE-BSD FRML MDRD: 75 ML/MIN/1.73SQ M
GLUCOSE SERPL-MCNC: 124 MG/DL (ref 65–140)
HCT VFR BLD AUTO: 41.7 % (ref 36.5–49.3)
HGB BLD-MCNC: 12.7 G/DL (ref 12–17)
MCH RBC QN AUTO: 26 PG (ref 26.8–34.3)
MCHC RBC AUTO-ENTMCNC: 30.5 G/DL (ref 31.4–37.4)
MCV RBC AUTO: 85 FL (ref 82–98)
PLATELET # BLD AUTO: 130 THOUSANDS/UL (ref 149–390)
PMV BLD AUTO: 9.9 FL (ref 8.9–12.7)
POTASSIUM SERPL-SCNC: 3.7 MMOL/L (ref 3.5–5.3)
PROCALCITONIN SERPL-MCNC: 3.1 NG/ML
RBC # BLD AUTO: 4.89 MILLION/UL (ref 3.88–5.62)
SODIUM SERPL-SCNC: 132 MMOL/L (ref 135–147)
WBC # BLD AUTO: 12.71 THOUSAND/UL (ref 4.31–10.16)

## 2025-05-06 PROCEDURE — 85027 COMPLETE CBC AUTOMATED: CPT | Performed by: PHYSICIAN ASSISTANT

## 2025-05-06 PROCEDURE — 99232 SBSQ HOSP IP/OBS MODERATE 35: CPT | Performed by: HOSPITALIST

## 2025-05-06 PROCEDURE — 80048 BASIC METABOLIC PNL TOTAL CA: CPT | Performed by: PHYSICIAN ASSISTANT

## 2025-05-06 PROCEDURE — 99254 IP/OBS CNSLTJ NEW/EST MOD 60: CPT | Performed by: PHYSICIAN ASSISTANT

## 2025-05-06 PROCEDURE — 84145 PROCALCITONIN (PCT): CPT | Performed by: PHYSICIAN ASSISTANT

## 2025-05-06 PROCEDURE — G0545 PR INHERENT VISIT TO INPT: HCPCS | Performed by: PHYSICIAN ASSISTANT

## 2025-05-06 RX ORDER — IBUPROFEN 400 MG/1
400 TABLET, FILM COATED ORAL ONCE
Status: COMPLETED | OUTPATIENT
Start: 2025-05-06 | End: 2025-05-06

## 2025-05-06 RX ADMIN — IBUPROFEN 400 MG: 400 TABLET, FILM COATED ORAL at 16:21

## 2025-05-06 RX ADMIN — CEFAZOLIN SODIUM 2000 MG: 2 SOLUTION INTRAVENOUS at 16:21

## 2025-05-06 RX ADMIN — ACETAMINOPHEN 650 MG: 325 TABLET ORAL at 07:29

## 2025-05-06 RX ADMIN — ATORVASTATIN CALCIUM 20 MG: 20 TABLET, FILM COATED ORAL at 16:21

## 2025-05-06 RX ADMIN — FLUTICASONE FUROATE AND VILANTEROL TRIFENATATE 1 PUFF: 200; 25 POWDER RESPIRATORY (INHALATION) at 07:31

## 2025-05-06 RX ADMIN — ENOXAPARIN SODIUM 40 MG: 40 INJECTION SUBCUTANEOUS at 17:36

## 2025-05-06 RX ADMIN — LEVOTHYROXINE SODIUM 200 MCG: 100 TABLET ORAL at 05:56

## 2025-05-06 RX ADMIN — LOSARTAN POTASSIUM 100 MG: 50 TABLET, FILM COATED ORAL at 08:28

## 2025-05-06 RX ADMIN — CEFAZOLIN SODIUM 2000 MG: 2 SOLUTION INTRAVENOUS at 02:00

## 2025-05-06 RX ADMIN — LEVOTHYROXINE SODIUM 75 MCG: 75 TABLET ORAL at 05:56

## 2025-05-06 RX ADMIN — CEFAZOLIN SODIUM 2000 MG: 2 SOLUTION INTRAVENOUS at 08:28

## 2025-05-06 RX ADMIN — ENOXAPARIN SODIUM 40 MG: 40 INJECTION SUBCUTANEOUS at 08:28

## 2025-05-06 NOTE — UTILIZATION REVIEW
NOTIFICATION OF INPATIENT ADMISSION   AUTHORIZATION REQUEST   SERVICING FACILITY:   [place] [address]  Tax ID: [id number]  NPI: [id number]   ATTENDING PROVIDER:  Attending Name and NPI#: Augustin Almeida Md [[id number]]  Address: [address] [place] [address]  Phone: [phone]     ADMISSION INFORMATION:  Place of Service: Inpatient [facility]  Place of Service Code: 21  Inpatient Admission Date/Time: 5/5/25  4:53 AM  Discharge Date/Time: No discharge date for patient encounter.  Admitting Diagnosis Code/Description:  Cellulitis [L03.90]  Fever [R50.9]  Sepsis (HCC) [A41.9]     UTILIZATION REVIEW CONTACT:  Litzy Schwartz Utilization   Network Utilization Review Department  Phone: [phone]  Fax: [phone]  Email: [email or web address]  Contact for approvals/pending authorizations, clinical reviews, and discharge.     PHYSICIAN ADVISORY SERVICES:  Medical Necessity Denial & Fvyq-vv-Efag Review  Phone: [phone]  Fax: [phone]  Email: [email or web address]     DISCHARGE SUPPORT TEAM:  For Patients Discharge Needs & Updates  Phone: [phone] opt. 2 Fax: [phone]  Email: [email or web address]

## 2025-05-06 NOTE — ASSESSMENT & PLAN NOTE
Sepsis parameters have improved  Secondary to a right thigh cellulitis  Cellulitis is improving, white blood cell count is improving, procalcitonin testing is uptrending  Tmax over the past 24 hours 102.9 °F  Antibiotics switched yesterday from IV ceftriaxone to IV cefazolin 2 g every 8 hours-continue the same  CT right lower extremity with the following results-Diffuse nonspecific subcutaneous edema may represent cellulitis in the appropriate clinical setting. No evidence of abscess. No evidence of deep soft tissue infection.   Blood cultures x 2 sets-are in progress  This is the second time over the past 3 months that the patient has had a right thigh cellulitis-will additionally consult ID to seek their opinion on the cause of the recurrent cellulitis and for antibiotic management assistance since the patient continues to remain febrile  Repeat blood work in the a.m.

## 2025-05-06 NOTE — ASSESSMENT & PLAN NOTE
Uncertain if this represents a recurrent cellulitis versus persistent symptoms due to suboptimal treatment courses.  Presenting with right medial thigh erythema, warmth, swelling and tenderness with associated fevers.  Ongoing since early March 2025.  Minimal improvement with Keflex 500 mg 4 times daily x 7 days.  PCP concerned for Lyme despite no known tick exposures and treated with 21-day course of p.o. doxycycline with some improvement.  Initial Lyme total antibody positive with negative reflex.  Subsequent testing negative.  Overall suspicion for Lyme is low without any obvious exposure and negative testing.  Appearance and distribution of erythema/cellulitis does appear streptococcal in nature.  He has chronic lower extremity venous stasis and likely underlying lymphedema which will certainly put him at risk for recurrent streptococcal cellulitis.  CT negative for underlying abscess or soft tissue emphysema.  Fortunately blood cultures are negative and cellulitis so far improving with IV cefazolin.  Continue IV cefazolin 2 g every 8 hours  Serial skin exams  Follow-up blood cultures  Encourage lower extremity elevation, compression, weight loss  Anticipate transition to p.o. antibiotics in the next few days

## 2025-05-06 NOTE — ASSESSMENT & PLAN NOTE
Suspected venous insufficiency of bilateral lower extremities with evidence of chronic nonpitting edema, venous stasis changes to the skin with dermatitis and varicosities on exam.

## 2025-05-06 NOTE — PROGRESS NOTES
Progress Note - Hospitalist   Name: Haja Shen 59 y.o. male I MRN: [id number]  Unit/Bed#: -01 I Date of Admission: 5/5/2025   Date of Service: 5/6/2025 I Hospital Day: 1    Assessment & Plan  Sepsis due to cellulitis (HCC)  Sepsis parameters have improved  Secondary to a right thigh cellulitis  Cellulitis is improving, white blood cell count is improving, procalcitonin testing is uptrending  Tmax over the past 24 hours 102.9 °F  Antibiotics switched yesterday from IV ceftriaxone to IV cefazolin 2 g every 8 hours-continue the same  CT right lower extremity with the following results-Diffuse nonspecific subcutaneous edema may represent cellulitis in the appropriate clinical setting. No evidence of abscess. No evidence of deep soft tissue infection.   Blood cultures x 2 sets-are in progress  This is the second time over the past 3 months that the patient has had a right thigh cellulitis-will additionally consult ID to seek their opinion on the cause of the recurrent cellulitis and for antibiotic management assistance since the patient continues to remain febrile  Repeat blood work in the a.m.    Benign essential HTN  Blood pressure is stable  Continue losartan  HLD (hyperlipidemia)  Continue Lipitor  Hypothyroid  Continue levothyroxine  Obesity, Class III, BMI 40-49.9 (morbid obesity)  Actual BMI of 40.63  Dietary, weight loss, and lifestyle modification counseling was provided  Asthma  Currently without exacerbation  Mild and intermittent otherwise  Continue Breo    VTE Pharmacologic Prophylaxis: VTE Score: 4 Moderate Risk (Score 3-4) - Pharmacological DVT Prophylaxis Ordered: enoxaparin (Lovenox).    Mobility:   Basic Mobility Inpatient Raw Score: 24  JH-HLM Goal: 8: Walk 250 feet or more  JH-HLM Achieved: 6: Walk 10 steps or more  JH-HLM Goal achieved. Continue to encourage appropriate mobility.    Patient Centered Rounds: I performed bedside rounds with nursing staff today.   Discussions with Specialists or  Other Care Team Provider: Infectious disease    Education and Discussions with Family / Patient: Patient declined call to .     Current Length of Stay: 1 day(s)  Current Patient Status: Inpatient   Certification Statement: The patient will continue to require additional inpatient hospital stay due to need for continued IV antibiotics  Discharge Plan: Anticipate discharge in 24-48 hrs to home.    Code Status: Level 1 - Full Code    Subjective   Patient seen, resting in bed, doing okay.  Denies any pain in his right leg.    Objective :  Temp:  [98.6 °F (37 °C)-102.9 °F (39.4 °C)] 101.5 °F (38.6 °C)  HR:  [] 99  BP: (124-153)/(46-89) 139/68  Resp:  [17-18] 17  SpO2:  [90 %-97 %] 97 %  O2 Device: None (Room air)    Body mass index is 40.63 kg/m².     Input and Output Summary (last 24 hours):     Intake/Output Summary (Last 24 hours) at 5/6/2025 0807  Last data filed at 5/5/2025 2331  Gross per 24 hour   Intake 2018.25 ml   Output --   Net 2018.25 ml       Physical Exam  Vitals and nursing note reviewed.   Constitutional:       General: He is not in acute distress.     Appearance: Normal appearance. He is not ill-appearing.   HENT:      Head: Normocephalic and atraumatic.      Nose: Nose normal.   Eyes:      Extraocular Movements: Extraocular movements intact.      Pupils: Pupils are equal, round, and reactive to light.   Cardiovascular:      Rate and Rhythm: Normal rate and regular rhythm.      Pulses: Normal pulses.      Heart sounds: Normal heart sounds. No murmur heard.     No friction rub. No gallop.   Pulmonary:      Effort: Pulmonary effort is normal.      Breath sounds: Normal breath sounds.   Abdominal:      General: There is no distension.      Palpations: Abdomen is soft. There is no mass.      Tenderness: There is no abdominal tenderness. There is no guarding or rebound.   Musculoskeletal:         General: No swelling or tenderness. Normal range of motion.      Cervical back: Normal range  of motion and neck supple. No rigidity. No muscular tenderness.      Right lower leg: No edema.      Left lower leg: No edema.      Comments: Improving right thigh cellulitis, redness is decreasing within the margins that were outlined at time of arrival   Skin:     General: Skin is warm.      Capillary Refill: Capillary refill takes less than 2 seconds.      Findings: No erythema or rash.   Neurological:      General: No focal deficit present.      Mental Status: He is alert and oriented to person, place, and time. Mental status is at baseline.   Psychiatric:         Mood and Affect: Mood normal.         Behavior: Behavior normal.           Lines/Drains:              Lab Results: I have reviewed the following results:   Results from last 7 days   Lab Units 05/06/25  0519 05/05/25  0426   WBC Thousand/uL 12.71* 14.03*   HEMOGLOBIN g/dL 12.7 13.4   HEMATOCRIT % 41.7 44.1   PLATELETS Thousands/uL 130* 169   BANDS PCT %  --  6   LYMPHO PCT %  --  4*   MONO PCT %  --  1*   EOS PCT %  --  0     Results from last 7 days   Lab Units 05/06/25  0519 05/05/25  0426   SODIUM mmol/L 132* 136   POTASSIUM mmol/L 3.7 4.3   CHLORIDE mmol/L 98 99   CO2 mmol/L 28 30   BUN mg/dL 17 16   CREATININE mg/dL 1.08 0.89   ANION GAP mmol/L 6 7   CALCIUM mg/dL 8.0* 9.4   ALBUMIN g/dL  --  4.2   TOTAL BILIRUBIN mg/dL  --  0.56   ALK PHOS U/L  --  60   ALT U/L  --  13   AST U/L  --  13   GLUCOSE RANDOM mg/dL 124 135     Results from last 7 days   Lab Units 05/05/25  0426   INR  0.96             Results from last 7 days   Lab Units 05/06/25  0519 05/05/25  0652 05/05/25  0426   LACTIC ACID mmol/L  --  2.0 2.1*   PROCALCITONIN ng/ml 3.10*  --  0.91*       Recent Cultures (last 7 days):   Results from last 7 days   Lab Units 05/05/25  0426   BLOOD CULTURE  Received in Microbiology Lab. Culture in Progress.  Received in Microbiology Lab. Culture in Progress.       Imaging Results Review: I reviewed radiology reports from this admission including:  CT right lower extremity-see above.  Other Study Results Review: No additional pertinent studies reviewed.    Last 24 Hours Medication List:     Current Facility-Administered Medications:     acetaminophen (TYLENOL) tablet 650 mg, Q4H PRN    acetaminophen (TYLENOL) tablet 975 mg, Once    atorvastatin (LIPITOR) tablet 20 mg, Daily With Dinner    ceFAZolin (ANCEF) IVPB (premix in dextrose) 2,000 mg 50 mL, Q8H, Last Rate: 2,000 mg (05/06/25 0200)    enoxaparin (LOVENOX) subcutaneous injection 40 mg, BID    fluticasone-vilanterol 200-25 mcg/actuation 1 puff, Daily    levothyroxine tablet 200 mcg, Early Morning    levothyroxine tablet 75 mcg, Early Morning    losartan (COZAAR) tablet 100 mg, Daily    ondansetron (ZOFRAN) injection 4 mg, Q6H PRN    Administrative Statements   Today, Patient Was Seen By: Augustin Almeida MD      **Please Note: This note may have been constructed using a voice recognition system.**

## 2025-05-06 NOTE — PLAN OF CARE
Problem: PAIN - ADULT  Goal: Verbalizes/displays adequate comfort level or baseline comfort level  Description: Interventions:- Encourage patient to monitor pain and request assistance- Assess pain using appropriate pain scale- Administer analgesics based on type and severity of pain and evaluate response- Implement non-pharmacological measures as appropriate and evaluate response- Consider cultural and social influences on pain and pain management- Notify physician/advanced practitioner if interventions unsuccessful or patient reports new pain  Outcome: Progressing     Problem: INFECTION - ADULT  Goal: Absence or prevention of progression during hospitalization  Description: INTERVENTIONS:- Assess and monitor for signs and symptoms of infection- Monitor lab/diagnostic results- Monitor all insertion sites, i.e. indwelling lines, tubes, and drains- Monitor endotracheal if appropriate and nasal secretions for changes in amount and color- [place] appropriate cooling/warming therapies per order- Administer medications as ordered- Instruct and encourage patient and family to use good hand hygiene technique- Identify and instruct in appropriate isolation precautions for identified infection/condition  Outcome: Progressing     Problem: SAFETY ADULT  Goal: Patient will remain free of falls  Description: INTERVENTIONS:- Educate patient/family on patient safety including physical limitations- Instruct patient to call for assistance with activity - Consult OT/PT to assist with strengthening/mobility - Keep Call bell within reach- Keep bed low and locked with side rails adjusted as appropriate- Keep care items and personal belongings within reach- Initiate and maintain comfort rounds- Make Fall Risk Sign visible to staff- Offer Toileting every 2 Hours, in advance of need- Initiate/Maintain bed alarm- Obtain necessary fall risk management equipment: yellow socks- Apply yellow socks and bracelet for high fall risk patients-  Consider moving patient to room near nurses station  Outcome: Progressing  Goal: Maintain or return to baseline ADL function  Description: INTERVENTIONS:-  Assess patient's ability to carry out ADLs; assess patient's baseline for ADL function and identify physical deficits which impact ability to perform ADLs (bathing, care of mouth/teeth, toileting, grooming, dressing, etc.)- Assess/evaluate cause of self-care deficits - Assess range of motion- Assess patient's mobility; develop plan if impaired- Assess patient's need for assistive devices and provide as appropriate- Encourage maximum independence but intervene and supervise when necessary- Involve family in performance of ADLs- Assess for home care needs following discharge - Consider OT consult to assist with ADL evaluation and planning for discharge- Provide patient education as appropriate  Outcome: Progressing  Goal: Maintains/Returns to pre admission functional level  Description: INTERVENTIONS:- Perform AM-PAC 6 Click Basic Mobility/ Daily Activity assessment daily.- Set and communicate daily mobility goal to care team and patient/family/caregiver. - Collaborate with rehabilitation services on mobility goals if consulted- Perform Range of Motion 2 times a day.- Reposition patient every 2 hours.- Dangle patient 3 times a day- Stand patient 3 times a day- Ambulate patient 3 times a day- Out of bed to chair 3 times a day - Out of bed for meals 3 times a day- Out of bed for toileting- Record patient progress and toleration of activity level   Outcome: Progressing     Problem: DISCHARGE PLANNING  Goal: Discharge to home or other facility with appropriate resources  Description: INTERVENTIONS:- Identify barriers to discharge w/patient and caregiver- Arrange for needed discharge resources and transportation as appropriate- Identify discharge learning needs (meds, wound care, etc.)- Arrange for interpretive services to assist at discharge as needed- Refer to Case  Management Department for coordinating discharge planning if the patient needs post-hospital services based on physician/advanced practitioner order or complex needs related to functional status, cognitive ability, or social support system  Outcome: Progressing     Problem: Knowledge Deficit  Goal: Patient/family/caregiver demonstrates understanding of disease process, treatment plan, medications, and discharge instructions  Description: Complete learning assessment and assess knowledge base.Interventions:- Provide teaching at level of understanding- Provide teaching via preferred learning methods  Outcome: Progressing

## 2025-05-06 NOTE — CONSULTS
Consultation - Infectious Disease   Name: Haja Shen 59 y.o. male I MRN: [id number]  Unit/Bed#: -01 I Date of Admission: 5/5/2025   Date of Service: 5/6/2025 I Hospital Day: 1     Inpatient consult to Infectious Diseases  Consult performed by: Dilip Garcia PA-C  Consult ordered by: Augustin Almeida MD      Physician Requesting Evaluation: Augustin Almeida MD   Reason for Evaluation / Principal Problem: sepsis, cellulitis     Assessment & Plan  Sepsis (HCC)  E/b fever, tachycardia, tachypnea, leukocytosis.  Most likely due to right lower extremity cellulitis.  Fortunately blood cultures x 2 are negative so far.  He is otherwise hemodynamically stable.  Leukocytosis is improving.  Clinically right lower extremity also improving..  Continue IV antibiotics as below  Follow-up blood cultures  Trend temps and hemodynamics  Monitor daily labs for response to treatment and for developing toxicities  Cellulitis of right leg  Uncertain if this represents a recurrent cellulitis versus persistent symptoms due to suboptimal treatment courses.  Presenting with right medial thigh erythema, warmth, swelling and tenderness with associated fevers.  Ongoing since early March 2025.  Minimal improvement with Keflex 500 mg 4 times daily x 7 days.  PCP concerned for Lyme despite no known tick exposures and treated with 21-day course of p.o. doxycycline with some improvement.  Initial Lyme total antibody positive with negative reflex.  Subsequent testing negative.  Overall suspicion for Lyme is low without any obvious exposure and negative testing.  Appearance and distribution of erythema/cellulitis does appear streptococcal in nature.  He has chronic lower extremity venous stasis and likely underlying lymphedema which will certainly put him at risk for recurrent streptococcal cellulitis.  CT negative for underlying abscess or soft tissue emphysema.  Fortunately blood cultures are negative and cellulitis so far  improving with IV cefazolin.  Continue IV cefazolin 2 g every 8 hours  Serial skin exams  Follow-up blood cultures  Encourage lower extremity elevation, compression, weight loss  Anticipate transition to p.o. antibiotics in the next few days  Venous insufficiency of both lower extremities  Suspected venous insufficiency of bilateral lower extremities with evidence of chronic nonpitting edema, venous stasis changes to the skin with dermatitis and varicosities on exam.  Obesity, Class III, BMI 40-49.9 (morbid obesity)  BMI 40.63.  Discussed weight loss, lifestyle modifications with patient and daughter at bedside.  Recommended follow-up with PCP for consideration of weight loss drugs.    I have discussed the above management plan in detail with the primary service.     Antibiotics:  IV cefazolin day 2    History of Present Illness   Haja Shen is a 59 y.o. year old male w history of venous insufficiency of the lower extremities, obesity, postsurgical hypothyroidism, hypertension, hyperlipidemia who initially presented on 5/5 with worsening right lower extremity cellulitis.  Patient was initially seen on 3/2 in urgent care with complaints of right lower extremity cellulitis mostly on right inner thigh with erythema, warmth and swelling.  He also reported temps up to 105 °F at that time.  He was recommended to go to the ED however opted instead for course of p.o. antibiotics prior.  He was started on p.o. Keflex 500 mg 4 times daily.  Due to persistent symptoms and fevers he was seen in the ED the subsequent day on 3/3.  Duplex was negative for DVT and CT scan was negative for abscess or necrotizing soft tissue infection.  ED discharged him home with plan to continue Keflex as previously prescribed.  He was again seen by his PCP on 3/12 for follow-up with ongoing complaints of erythema and swelling of the lower extremity with fevers and night sweats.  Patient's PCP was concerned about Lyme and felt his rash appeared  bull's-eye distribution and started patient on 21-day course of p.o. doxycycline.  Patient had outpatient lab work done on 3/14 which included Lyme antibody reflex.  This was initially reactive however reflex IgG and IgM were negative.  Patient denied any known tick exposures.  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  Blood cultures at that time were negative.  Patient states he had more improvement on the p.o. Doxy than he did with the Keflex.  He followed up with his PCP again on 3/20 with plan to continue doxycycline as ordered.  He continued to have faint erythema but overall had improvement in swelling and pain.  Unfortunately fevers recurred and he was seen back in the ED on 5/5 with concern for Lyme's disease.  Noted his right medial thigh was again erythematous, warm and painful.  He was started on IV ceftriaxone and admitted.  He continued to have fevers overnight and infectious disease consult was requested.  Primary team had changed patient to IV cefazolin 2 g every 8 hours.  Patient notes ongoing improvement with IV cefazolin with erythema now within drawn on skin margins.     A complete review of systems is negative other than that noted in the HPI.    Medical History Review: I have reviewed the patient's PMH, PSH, Social History, Family History, Meds, and Allergies     Objective :  Temp:  [98.6 °F (37 °C)-102.9 °F (39.4 °C)] 101.5 °F (38.6 °C)  HR:  [] 99  BP: (124-153)/(46-89) 139/68  Resp:  [17-18] 17  SpO2:  [90 %-97 %] 97 %  O2 Device: None (Room air)    Physical exam:  General:  No acute distress, sitting in bedside recliner chatting with his daughter  Psychiatric:  Awake and alert, pleasant and cooperative.  HEENT: Neck supple, mucous membranes moist, head normocephalic  Cardiovascular: Regular rate and rhythm no murmur  Pulmonary: On room air, no dyspnea with conversation or labored breathing  Abdomen:  Soft, nontender, obese abdomen  Extremities:  Chronic lower extremity nonpitting edema  Skin:  No rashes.  Bilateral lower extremity venous stasis dermatitis.  Right medial thigh erythema noted, sparing the right knee, likely due to vitiligo.      Lab Results: I have reviewed the following results:  Results from last 7 days   Lab Units 05/06/25  0519 05/05/25  0426   WBC Thousand/uL 12.71* 14.03*   HEMOGLOBIN g/dL 12.7 13.4   PLATELETS Thousands/uL 130* 169     Results from last 7 days   Lab Units 05/06/25  0519 05/05/25  0426   SODIUM mmol/L 132* 136   POTASSIUM mmol/L 3.7 4.3   CHLORIDE mmol/L 98 99   CO2 mmol/L 28 30   BUN mg/dL 17 16   CREATININE mg/dL 1.08 0.89   EGFR ml/min/1.73sq m 75 94   CALCIUM mg/dL 8.0* 9.4   AST U/L  --  13   ALT U/L  --  13   ALK PHOS U/L  --  60   ALBUMIN g/dL  --  4.2     Results from last 7 days   Lab Units 05/05/25  0426   BLOOD CULTURE  No Growth at 24 hrs.  No Growth at 24 hrs.     Results from last 7 days   Lab Units 05/06/25  0519 05/05/25  0426   PROCALCITONIN ng/ml 3.10* 0.91*                   Imaging Results Review: I reviewed radiology reports from this admission including: CT lower extremity.  Other Study Results Review: Other studies reviewed include: Micro

## 2025-05-06 NOTE — ASSESSMENT & PLAN NOTE
E/b fever, tachycardia, tachypnea, leukocytosis.  Most likely due to right lower extremity cellulitis.  Fortunately blood cultures x 2 are negative so far.  He is otherwise hemodynamically stable.  Leukocytosis is improving.  Clinically right lower extremity also improving..  Continue IV antibiotics as below  Follow-up blood cultures  Trend temps and hemodynamics  Monitor daily labs for response to treatment and for developing toxicities

## 2025-05-06 NOTE — PLAN OF CARE
Problem: INFECTION - ADULT  Goal: Absence or prevention of progression during hospitalization  Description: INTERVENTIONS:- Assess and monitor for signs and symptoms of infection- Monitor lab/diagnostic results- Monitor all insertion sites, i.e. indwelling lines, tubes, and drains- Monitor endotracheal if appropriate and nasal secretions for changes in amount and color- [place] appropriate cooling/warming therapies per order- Administer medications as ordered- Instruct and encourage patient and family to use good hand hygiene technique- Identify and instruct in appropriate isolation precautions for identified infection/condition  Outcome: Progressing     Problem: PAIN - ADULT  Goal: Verbalizes/displays adequate comfort level or baseline comfort level  Description: Interventions:- Encourage patient to monitor pain and request assistance- Assess pain using appropriate pain scale- Administer analgesics based on type and severity of pain and evaluate response- Implement non-pharmacological measures as appropriate and evaluate response- Consider cultural and social influences on pain and pain management- Notify physician/advanced practitioner if interventions unsuccessful or patient reports new pain  Outcome: Progressing

## 2025-05-06 NOTE — UTILIZATION REVIEW
Initial Clinical Review    Admission: Date/Time/Statement:   Admission Orders (From admission, onward)       Ordered        05/05/25 0453  INPATIENT ADMISSION  Once                          Orders Placed This Encounter   Procedures    INPATIENT ADMISSION     Standing Status:   Standing     Number of Occurrences:   1     Level of Care:   Med Surg [16]     Estimated length of stay:   More than 2 Midnights     Certification:   I certify that inpatient services are medically necessary for this patient for a duration of greater than two midnights. See H&P and MD Progress Notes for additional information about the patient's course of treatment.     ED Arrival Information       Expected   -    Arrival   5/5/2025 04:04    Acuity   Urgent              Means of arrival   Walk-In    Escorted by   Spouse    Service   Hospitalist    Admission type   Emergency              Arrival complaint   fever rash             Chief Complaint   Patient presents with    Fever     Fever, body aches, rash on leg; states he thinks his lymes disease is coming back, no sick contacts       5/5/25 Initial Presentation: 59 y.o. male presents to the ED with fever, body aches and rash on thigh. Patient reports hx of Lyme disease few months ago.  Pt woke around 0130 am with fever, shaking chills. Noted rash on right thigh. No injury, scratches. Leg feels warm to touch. PMH:  Asthma, Morbid obesity, Hypothyroidism, HTN, HLD Exam: see picture. Right thigh with warmth and erythema. +stasis changes lower extremties  + febrile 100.5 F in ED, + dry mucous membranes, + wheezing, + edema in LE's. Abnormal labs/imaging: WBC 14.03, Lactic Acid 2.1, Procal 0.91. CT RLE reveals Diffuse nonspecific subcutaneous edema may represent cellulitis. Pt given 2 L IVF bolus and IV Rocephin. Blood cultures collected.  Plan: admit to inpatient for sepsis due to cellulitis, serial exams or RLE, IV abx, f/u blood cultures.           Anticipated Length of Stay/Certification  Statement: Patient will be admitted on an inpatient basis with an anticipated length of stay of greater than 2 midnights secondary to IV abx, follow up cultures.       Date: 5/6/25   Day 2: Hospitalist:  Improving right thigh cellulitis, redness is decreasing within the margins that were outlined at time of arrival.TMax 102.9 overnight. Cellulitis is improving, white blood cell count is improving, procalcitonin testing is up trending. Pt remains Febrile despite abx. Plan: Antibiotics switched yesterday from IV ceftriaxone to IV cefazolin 2 g every 8 hours-continue the same, consult ID as this is the second time in 3 months pt has had cellulitis. Repeat Labs CBC, BMP in AM 5/7.    Certification Statement: The patient will continue to require additional inpatient hospital stay due to need for continued IV antibiotics     ED Treatment-Medication Administration from 05/05/2025 0404 to 05/05/2025 0520         Date/Time Order Dose Route Action     05/05/2025 0427 sodium chloride 0.9 % bolus 1,000 mL 1,000 mL Intravenous New Bag     05/05/2025 0457 sodium chloride 0.9 % bolus 1,000 mL 1,000 mL Intravenous New Bag     05/05/2025 0427 ceftriaxone (ROCEPHIN) 2 g/50 mL in dextrose IVPB 2,000 mg Intravenous New Bag            Scheduled Medications:  acetaminophen, 975 mg, Oral, Once  atorvastatin, 20 mg, Oral, Daily With Dinner  cefazolin, 2,000 mg, Intravenous, Q8H  enoxaparin, 40 mg, Subcutaneous, BID  fluticasone-vilanterol, 1 puff, Inhalation, Daily  levothyroxine, 200 mcg, Oral, Early Morning  levothyroxine, 75 mcg, Oral, Early Morning  losartan, 100 mg, Oral, Daily      Continuous IV Infusions:  multi-electrolyte (Plasmalyte-A/Isolyte-S PH 7.4/Normosol-R) IV solution  Rate: 75 mL/hr Dose: 75 mL/hr  Freq: Continuous Route: IV  Last Dose: Stopped (05/06/25 0835)  Start: 05/05/25 1015 End: 05/06/25 0748     PRN Meds:  acetaminophen, 650 mg, Oral, Q4H PRN x1 dose 5/5 and 1 dose 5/6   ondansetron, 4 mg, Intravenous, Q6H  PRN      ED Triage Vitals [05/05/25 0220]   Temperature Pulse Respirations Blood Pressure SpO2 Pain Score   100.5 °F (38.1 °C) (!) 115 22 162/74 92 % 4     Weight (last 2 days)       Date/Time Weight    05/05/25 0513 136 (299.61)            Vital Signs (last 3 days)       Date/Time Temp Pulse Resp BP MAP (mmHg) SpO2 O2 Device Patient Position - Orthostatic VS Omar Coma Scale Score Pain    05/06/25 0729 -- -- -- -- -- -- -- -- -- Med Not Given for Pain - for MAR use only    05/06/25 07:11:38 101.5 °F (38.6 °C) 99 17 139/68 92 97 % None (Room air) Lying -- --    05/06/25 0129 -- -- -- -- -- 90 % None (Room air) -- 15 No Pain    05/05/25 22:19:50 98.9 °F (37.2 °C) 77 18 124/46 72 93 % -- -- -- --    05/05/25 21:19:17 99.9 °F (37.7 °C) 92 -- -- -- 96 % -- -- -- --    05/05/25 1910 98.6 °F (37 °C) -- -- -- -- -- -- -- -- --    05/05/25 1821 -- -- -- -- -- -- -- -- -- Med Not Given for Pain - for MAR use only    05/05/25 1810 102.7 °F (39.3 °C) -- -- -- -- -- -- -- -- --    05/05/25 1605 101.1 °F (38.4 °C) -- -- -- -- -- -- -- -- --    05/05/25 1505 -- -- -- -- -- -- -- -- -- Med Not Given for Pain - for MAR use only    05/05/25 14:59:58 102.9 °F (39.4 °C) 109 18 153/89 110 -- -- Lying -- --    05/05/25 0815 -- -- -- -- -- -- -- -- 15 No Pain    05/05/25 0730 -- 107 -- 148/79 102 94 % -- -- -- --    05/05/25 0715 97.8 °F (36.6 °C) 99 18 150/62 91 93 % -- -- -- --    05/05/25 0700 -- 108 -- 185/68 107 94 % -- -- -- --    05/05/25 0645 -- 106 -- 156/64 95 94 % -- -- -- --    05/05/25 0637 -- -- -- -- -- 94 % None (Room air) -- 15 --    05/05/25 0630 -- 99 -- 125/79 94 93 % -- -- -- --    05/05/25 0615 -- 100 -- 141/76 98 95 % -- -- -- --    05/05/25 0600 -- 103 -- 143/80 101 95 % -- -- -- --    05/05/25 05:50:45 -- 98 -- -- -- 96 % -- -- -- --    05/05/25 05:48:40 100 °F (37.8 °C) 95 19 143/83 103 95 % -- -- -- --    05/05/25 0524 -- -- -- -- -- -- -- -- -- No Pain    05/05/25 0513 99.8 °F (37.7 °C) 99 18 128/60 87 90 %  None (Room air) Lying -- --    05/05/25 0501 -- 109 20 144/65 94 91 % None (Room air) Lying -- --    05/05/25 0440 -- -- -- -- -- -- None (Room air) -- 15 --    05/05/25 0433 -- 110 18 163/71 102 93 % None (Room air) Sitting -- --    05/05/25 0430 -- 108 -- -- -- 94 % -- -- -- --    05/05/25 0412 100.5 °F (38.1 °C) 115 22 162/74 -- 92 % -- -- -- 4              Pertinent Labs/Diagnostic Test Results:   Radiology:  CT lower extremity w contrast right   Final Interpretation by Tyrese Garcia MD (05/05 1158)      Diffuse nonspecific subcutaneous edema may represent cellulitis in the appropriate clinical setting.      No evidence of abscess.      No evidence of deep soft tissue infection.         Workstation performed: [id number]               Results from last 7 days   Lab Units 05/06/25 0519 05/05/25 0426   WBC Thousand/uL 12.71* 14.03*   HEMOGLOBIN g/dL 12.7 13.4   HEMATOCRIT % 41.7 44.1   PLATELETS Thousands/uL 130* 169   BANDS PCT %  --  6         Results from last 7 days   Lab Units 05/06/25 0519 05/05/25  0426   SODIUM mmol/L 132* 136   POTASSIUM mmol/L 3.7 4.3   CHLORIDE mmol/L 98 99   CO2 mmol/L 28 30   ANION GAP mmol/L 6 7   BUN mg/dL 17 16   CREATININE mg/dL 1.08 0.89   EGFR ml/min/1.73sq m 75 94   CALCIUM mg/dL 8.0* 9.4     Results from last 7 days   Lab Units 05/05/25  0426   AST U/L 13   ALT U/L 13   ALK PHOS U/L 60   TOTAL PROTEIN g/dL 8.0   ALBUMIN g/dL 4.2   TOTAL BILIRUBIN mg/dL 0.56         Results from last 7 days   Lab Units 05/06/25 0519 05/05/25  0426   GLUCOSE RANDOM mg/dL 124 135       Results from last 7 days   Lab Units 05/05/25  0426   PROTIME seconds 13.3   INR  0.96   PTT seconds 28         Results from last 7 days   Lab Units 05/06/25 0519 05/05/25  0426   PROCALCITONIN ng/ml 3.10* 0.91*     Results from last 7 days   Lab Units 05/05/25  0652 05/05/25  0426   LACTIC ACID mmol/L 2.0 2.1*         Results from last 7 days   Lab Units 05/05/25  0426   BLOOD CULTURE  No Growth at 24  hrs.  No Growth at 24 hrs.         Past Medical History:   Diagnosis Date    Asthma     Disease of thyroid gland     Hematoma of left axilla     Last Assessed:9/23/2015    Hypertension     Hyperthyroidism     Hypothyroidism     Toxic multinodular goiter     Last Assessed:12/26/2013     Present on Admission:   Sepsis due to cellulitis (HCC)   Benign essential HTN   HLD (hyperlipidemia)   Hypothyroid   Obesity, Class III, BMI 40-49.9 (morbid obesity)   Asthma      Admitting Diagnosis: Cellulitis [L03.90]  Fever [R50.9]  Sepsis (HCC) [A41.9]  Age/Sex: 59 y.o. male    Network Utilization Review Department  ATTENTION: Please call with any questions or concerns to [phone] and carefully listen to the prompts so that you are directed to the right person. All voicemails are confidential.   For Discharge needs, contact Care Management DC Support Team at [phone] opt. [phone]  Send all requests for admission clinical reviews, approved or denied determinations and any other requests to dedicated fax number below belonging to the campus where the patient is receiving treatment. List of dedicated fax numbers for the Facilities:  FACILITY NAME UR FAX NUMBER   ADMISSION DENIALS (Administrative/Medical Necessity) [phone]   DISCHARGE SUPPORT TEAM (NETWORK) [phone]   PARENT CHILD HEALTH (Maternity/NICU/Pediatrics) [phone]   [facility] [phone]   [facility] [phone]   [facility] [phone]   [facility] [phone]   [facility] [phone]   [facility] [phone]   [facility] [phone]   [facility] [phone]   [facility] [phone]   [facility] [phone]   [facility] [phone]   [facility]  [facility] [phone]

## 2025-05-06 NOTE — PLAN OF CARE
Problem: PAIN - ADULT  Goal: Verbalizes/displays adequate comfort level or baseline comfort level  Description: Interventions:- Encourage patient to monitor pain and request assistance- Assess pain using appropriate pain scale- Administer analgesics based on type and severity of pain and evaluate response- Implement non-pharmacological measures as appropriate and evaluate response- Consider cultural and social influences on pain and pain management- Notify physician/advanced practitioner if interventions unsuccessful or patient reports new pain  Outcome: Progressing     Problem: INFECTION - ADULT  Goal: Absence or prevention of progression during hospitalization  Description: INTERVENTIONS:- Assess and monitor for signs and symptoms of infection- Monitor lab/diagnostic results- Monitor all insertion sites, i.e. indwelling lines, tubes, and drains- Monitor endotracheal if appropriate and nasal secretions for changes in amount and color- [place] appropriate cooling/warming therapies per order- Administer medications as ordered- Instruct and encourage patient and family to use good hand hygiene technique- Identify and instruct in appropriate isolation precautions for identified infection/condition  Outcome: Progressing     Problem: SAFETY ADULT  Goal: Patient will remain free of falls  Description: INTERVENTIONS:- Educate patient/family on patient safety including physical limitations- Instruct patient to call for assistance with activity - Consult OT/PT to assist with strengthening/mobility - Keep Call bell within reach- Keep bed low and locked with side rails adjusted as appropriate- Keep care items and personal belongings within reach- Initiate and maintain comfort rounds- Make Fall Risk Sign visible to staff- Offer Toileting every 2 Hours, in advance of need- Initiate/Maintain bed alarm- Obtain necessary fall risk management equipment: yellow socks- Apply yellow socks and bracelet for high fall risk patients-  Consider moving patient to room near nurses station  Outcome: Progressing  Goal: Maintain or return to baseline ADL function  Description: INTERVENTIONS:-  Assess patient's ability to carry out ADLs; assess patient's baseline for ADL function and identify physical deficits which impact ability to perform ADLs (bathing, care of mouth/teeth, toileting, grooming, dressing, etc.)- Assess/evaluate cause of self-care deficits - Assess range of motion- Assess patient's mobility; develop plan if impaired- Assess patient's need for assistive devices and provide as appropriate- Encourage maximum independence but intervene and supervise when necessary- Involve family in performance of ADLs- Assess for home care needs following discharge - Consider OT consult to assist with ADL evaluation and planning for discharge- Provide patient education as appropriate  Outcome: Progressing  Goal: Maintains/Returns to pre admission functional level  Description: INTERVENTIONS:- Perform AM-PAC 6 Click Basic Mobility/ Daily Activity assessment daily.- Set and communicate daily mobility goal to care team and patient/family/caregiver. - Collaborate with rehabilitation services on mobility goals if consulted- Perform Range of Motion 2 times a day.- Reposition patient every 2 hours.- Dangle patient 3 times a day- Stand patient 3 times a day- Ambulate patient 3 times a day- Out of bed to chair 3 times a day - Out of bed for meals 3 times a day- Out of bed for toileting- Record patient progress and toleration of activity level   Outcome: Progressing     Problem: DISCHARGE PLANNING  Goal: Discharge to home or other facility with appropriate resources  Description: INTERVENTIONS:- Identify barriers to discharge w/patient and caregiver- Arrange for needed discharge resources and transportation as appropriate- Identify discharge learning needs (meds, wound care, etc.)- Arrange for interpretive services to assist at discharge as needed- Refer to Case  Management Department for coordinating discharge planning if the patient needs post-hospital services based on physician/advanced practitioner order or complex needs related to functional status, cognitive ability, or social support system  Outcome: Progressing     Problem: Knowledge Deficit  Goal: Patient/family/caregiver demonstrates understanding of disease process, treatment plan, medications, and discharge instructions  Description: Complete learning assessment and assess knowledge base.Interventions:- Provide teaching at level of understanding- Provide teaching via preferred learning methods  Outcome: Progressing

## 2025-05-06 NOTE — ASSESSMENT & PLAN NOTE
BMI 40.63.  Discussed weight loss, lifestyle modifications with patient and daughter at bedside.  Recommended follow-up with PCP for consideration of weight loss drugs.

## 2025-05-07 PROBLEM — E87.6 HYPOKALEMIA: Status: ACTIVE | Noted: 2025-05-07

## 2025-05-07 LAB
ANION GAP SERPL CALCULATED.3IONS-SCNC: 8 MMOL/L (ref 4–13)
BASOPHILS # BLD AUTO: 0.04 THOUSANDS/ÂΜL (ref 0–0.1)
BASOPHILS NFR BLD AUTO: 0 % (ref 0–1)
BUN SERPL-MCNC: 16 MG/DL (ref 5–25)
CALCIUM SERPL-MCNC: 7.7 MG/DL (ref 8.4–10.2)
CHLORIDE SERPL-SCNC: 100 MMOL/L (ref 96–108)
CO2 SERPL-SCNC: 27 MMOL/L (ref 21–32)
CREAT SERPL-MCNC: 0.78 MG/DL (ref 0.6–1.3)
EOSINOPHIL # BLD AUTO: 0.16 THOUSAND/ÂΜL (ref 0–0.61)
EOSINOPHIL NFR BLD AUTO: 2 % (ref 0–6)
ERYTHROCYTE [DISTWIDTH] IN BLOOD BY AUTOMATED COUNT: 17.2 % (ref 11.6–15.1)
GFR SERPL CREATININE-BSD FRML MDRD: 100 ML/MIN/1.73SQ M
GLUCOSE SERPL-MCNC: 113 MG/DL (ref 65–140)
HCT VFR BLD AUTO: 35.6 % (ref 36.5–49.3)
HGB BLD-MCNC: 10.9 G/DL (ref 12–17)
IMM GRANULOCYTES # BLD AUTO: 0.07 THOUSAND/UL (ref 0–0.2)
IMM GRANULOCYTES NFR BLD AUTO: 1 % (ref 0–2)
LYMPHOCYTES # BLD AUTO: 1.03 THOUSANDS/ÂΜL (ref 0.6–4.47)
LYMPHOCYTES NFR BLD AUTO: 11 % (ref 14–44)
MCH RBC QN AUTO: 25.5 PG (ref 26.8–34.3)
MCHC RBC AUTO-ENTMCNC: 30.6 G/DL (ref 31.4–37.4)
MCV RBC AUTO: 83 FL (ref 82–98)
MONOCYTES # BLD AUTO: 0.74 THOUSAND/ÂΜL (ref 0.17–1.22)
MONOCYTES NFR BLD AUTO: 8 % (ref 4–12)
NEUTROPHILS # BLD AUTO: 7.63 THOUSANDS/ÂΜL (ref 1.85–7.62)
NEUTS SEG NFR BLD AUTO: 78 % (ref 43–75)
NRBC BLD AUTO-RTO: 0 /100 WBCS
PLATELET # BLD AUTO: 116 THOUSANDS/UL (ref 149–390)
PMV BLD AUTO: 9.8 FL (ref 8.9–12.7)
POTASSIUM SERPL-SCNC: 3.3 MMOL/L (ref 3.5–5.3)
RBC # BLD AUTO: 4.27 MILLION/UL (ref 3.88–5.62)
SODIUM SERPL-SCNC: 135 MMOL/L (ref 135–147)
WBC # BLD AUTO: 9.67 THOUSAND/UL (ref 4.31–10.16)

## 2025-05-07 PROCEDURE — 99232 SBSQ HOSP IP/OBS MODERATE 35: CPT | Performed by: PHYSICIAN ASSISTANT

## 2025-05-07 PROCEDURE — 85025 COMPLETE CBC W/AUTO DIFF WBC: CPT | Performed by: HOSPITALIST

## 2025-05-07 PROCEDURE — 80048 BASIC METABOLIC PNL TOTAL CA: CPT | Performed by: HOSPITALIST

## 2025-05-07 PROCEDURE — 99232 SBSQ HOSP IP/OBS MODERATE 35: CPT | Performed by: HOSPITALIST

## 2025-05-07 PROCEDURE — G0545 PR INHERENT VISIT TO INPT: HCPCS | Performed by: PHYSICIAN ASSISTANT

## 2025-05-07 RX ORDER — POTASSIUM CHLORIDE 1500 MG/1
40 TABLET, EXTENDED RELEASE ORAL ONCE
Status: COMPLETED | OUTPATIENT
Start: 2025-05-07 | End: 2025-05-07

## 2025-05-07 RX ADMIN — ATORVASTATIN CALCIUM 20 MG: 20 TABLET, FILM COATED ORAL at 16:46

## 2025-05-07 RX ADMIN — CEFAZOLIN SODIUM 2000 MG: 2 SOLUTION INTRAVENOUS at 01:52

## 2025-05-07 RX ADMIN — CEFAZOLIN SODIUM 2000 MG: 2 SOLUTION INTRAVENOUS at 08:37

## 2025-05-07 RX ADMIN — ENOXAPARIN SODIUM 40 MG: 40 INJECTION SUBCUTANEOUS at 17:43

## 2025-05-07 RX ADMIN — LEVOTHYROXINE SODIUM 75 MCG: 75 TABLET ORAL at 05:08

## 2025-05-07 RX ADMIN — LOSARTAN POTASSIUM 100 MG: 50 TABLET, FILM COATED ORAL at 08:37

## 2025-05-07 RX ADMIN — POTASSIUM CHLORIDE 40 MEQ: 1500 TABLET, EXTENDED RELEASE ORAL at 08:37

## 2025-05-07 RX ADMIN — FLUTICASONE FUROATE AND VILANTEROL TRIFENATATE 1 PUFF: 200; 25 POWDER RESPIRATORY (INHALATION) at 08:37

## 2025-05-07 RX ADMIN — ENOXAPARIN SODIUM 40 MG: 40 INJECTION SUBCUTANEOUS at 08:37

## 2025-05-07 RX ADMIN — LEVOTHYROXINE SODIUM 200 MCG: 100 TABLET ORAL at 05:08

## 2025-05-07 RX ADMIN — CEFAZOLIN SODIUM 2000 MG: 2 SOLUTION INTRAVENOUS at 16:46

## 2025-05-07 NOTE — ASSESSMENT & PLAN NOTE
Uncertain if this represents a recurrent cellulitis versus persistent symptoms due to suboptimal treatment courses.  Presenting with right medial thigh erythema, warmth, swelling and tenderness with associated fevers.  Ongoing since early March 2025.  Minimal improvement with Keflex 500 mg 4 times daily x 7 days.  PCP concerned for Lyme despite no known tick exposures and treated with 21-day course of p.o. doxycycline with some improvement.  Initial Lyme total antibody positive with negative reflex.  Subsequent testing negative.  Overall suspicion for Lyme is low without any obvious exposure and negative testing.  Appearance and distribution of erythema/cellulitis does appear streptococcal in nature.  He has chronic lower extremity venous stasis and likely underlying lymphedema which will certainly put him at risk for recurrent streptococcal cellulitis.  CT negative for underlying abscess or soft tissue emphysema.  Fortunately blood cultures are negative and cellulitis so far improving with IV cefazolin.  Continue IV cefazolin 2 g every 8 hours  Serial skin exams  Continue to follow-up blood cultures  Encourage lower extremity elevation, compression, weight loss  Anticipate transition to p.o. antibiotics in the next 24 hours

## 2025-05-07 NOTE — NURSING NOTE
AWAKE AND ALERT AND ORIENTED X4. R/A STATUS 02 SAT 94% HR 75/MIN. RIGHT THIGH REDNESS IS GETTING LIGHTER. PAIN LEVEL 0/10. CALL BELL NEAR.

## 2025-05-07 NOTE — PLAN OF CARE
Problem: PAIN - ADULT  Goal: Verbalizes/displays adequate comfort level or baseline comfort level  Description: Interventions:- Encourage patient to monitor pain and request assistance- Assess pain using appropriate pain scale- Administer analgesics based on type and severity of pain and evaluate response- Implement non-pharmacological measures as appropriate and evaluate response- Consider cultural and social influences on pain and pain management- Notify physician/advanced practitioner if interventions unsuccessful or patient reports new pain  Outcome: Progressing     Problem: INFECTION - ADULT  Goal: Absence or prevention of progression during hospitalization  Description: INTERVENTIONS:- Assess and monitor for signs and symptoms of infection- Monitor lab/diagnostic results- Monitor all insertion sites, i.e. indwelling lines, tubes, and drains- Monitor endotracheal if appropriate and nasal secretions for changes in amount and color- [place] appropriate cooling/warming therapies per order- Administer medications as ordered- Instruct and encourage patient and family to use good hand hygiene technique- Identify and instruct in appropriate isolation precautions for identified infection/condition  Outcome: Progressing     Problem: Knowledge Deficit  Goal: Patient/family/caregiver demonstrates understanding of disease process, treatment plan, medications, and discharge instructions  Description: Complete learning assessment and assess knowledge base.Interventions:- Provide teaching at level of understanding- Provide teaching via preferred learning methods  Outcome: Progressing

## 2025-05-07 NOTE — PROGRESS NOTES
Progress Note - Hospitalist   Name: Haja Shen 59 y.o. male I MRN: [id number]  Unit/Bed#: -01 I Date of Admission: 5/5/2025   Date of Service: 5/7/2025 I Hospital Day: 2    Assessment & Plan  Sepsis due to cellulitis (HCC)  Sepsis parameters have improved  Secondary to a right thigh cellulitis  Cellulitis is improving  White blood cell count has normalized, Tmax over the past 24 hours-101 °F  Continue IV cefazolin 2 g every 8 hours day #3  CT right lower extremity reviewed  Blood cultures x 2 sets-no growth at 48 hours  Appreciate ID input  Hopeful discharge planning in 24 hours  Benign essential HTN  Blood pressure is stable  Continue losartan  HLD (hyperlipidemia)  Continue Lipitor  Hypothyroid  Continue levothyroxine  Obesity, Class III, BMI 40-49.9 (morbid obesity)  Actual BMI of 40.63  Dietary, weight loss, and lifestyle modification counseling was provided  Asthma  Currently without exacerbation  Mild and intermittent otherwise  Continue Breo  Sepsis (HCC)    Cellulitis of right leg    Venous insufficiency of both lower extremities    Hypokalemia  Replete and recheck    VTE Pharmacologic Prophylaxis: VTE Score: 4 Moderate Risk (Score 3-4) - Pharmacological DVT Prophylaxis Ordered: enoxaparin (Lovenox).    Mobility:   Basic Mobility Inpatient Raw Score: 24  JH-HLM Goal: 8: Walk 250 feet or more  JH-HLM Achieved: 7: Walk 25 feet or more  JH-HLM Goal achieved. Continue to encourage appropriate mobility.    Patient Centered Rounds: I performed bedside rounds with nursing staff today.   Discussions with Specialists or Other Care Team Provider: Infectious disease    Education and Discussions with Family / Patient: Patient declined call to .     Current Length of Stay: 2 day(s)  Current Patient Status: Inpatient   Certification Statement: The patient will continue to require additional inpatient hospital stay due to need for continued IV antibiotics  Discharge Plan: Anticipate discharge tomorrow  to home.    Code Status: Level 1 - Full Code    Subjective   Patient seen, resting in bed, reports feeling better.  Patient is questioning when he gets to go home, he has some important family events scheduled for Friday this week, and would like to be discharged potentially in the next 24 hours    Objective :  Temp:  [97.8 °F (36.6 °C)-101 °F (38.3 °C)] 98.6 °F (37 °C)  HR:  [68-97] 68  BP: (109-146)/(66-86) 123/66  Resp:  [17-18] 17  SpO2:  [94 %-96 %] 94 %  O2 Device: None (Room air)    Body mass index is 40.63 kg/m².     Input and Output Summary (last 24 hours):     Intake/Output Summary (Last 24 hours) at 5/7/2025 1044  Last data filed at 5/7/2025 0836  Gross per 24 hour   Intake 567 ml   Output --   Net 567 ml       Physical Exam  Vitals and nursing note reviewed.   Constitutional:       General: He is not in acute distress.     Appearance: Normal appearance. He is not ill-appearing.   HENT:      Head: Normocephalic and atraumatic.      Nose: Nose normal.   Eyes:      Extraocular Movements: Extraocular movements intact.      Pupils: Pupils are equal, round, and reactive to light.   Cardiovascular:      Rate and Rhythm: Normal rate and regular rhythm.      Pulses: Normal pulses.      Heart sounds: Normal heart sounds. No murmur heard.     No friction rub. No gallop.   Pulmonary:      Effort: Pulmonary effort is normal.      Breath sounds: Normal breath sounds.   Abdominal:      General: There is no distension.      Palpations: Abdomen is soft. There is no mass.      Tenderness: There is no abdominal tenderness. There is no guarding or rebound.   Musculoskeletal:         General: No swelling or tenderness. Normal range of motion.      Cervical back: Normal range of motion and neck supple. No rigidity. No muscular tenderness.      Right lower leg: No edema.      Left lower leg: No edema.      Comments: Improved right thigh cellulitis, bilateral lower extremity chronic venous stasis changes noted   Skin:      General: Skin is warm.      Capillary Refill: Capillary refill takes less than 2 seconds.      Findings: No erythema or rash.   Neurological:      General: No focal deficit present.      Mental Status: He is alert and oriented to person, place, and time. Mental status is at baseline.   Psychiatric:         Mood and Affect: Mood normal.         Behavior: Behavior normal.           Lab Results: I have reviewed the following results:   Results from last 7 days   Lab Units 05/07/25 0507 05/06/25 0519 05/05/25 0426   WBC Thousand/uL 9.67   < > 14.03*   HEMOGLOBIN g/dL 10.9*   < > 13.4   HEMATOCRIT % 35.6*   < > 44.1   PLATELETS Thousands/uL 116*   < > 169   BANDS PCT %  --   --  6   SEGS PCT % 78*  --   --    LYMPHO PCT % 11*  --  4*   MONO PCT % 8  --  1*   EOS PCT % 2  --  0    < > = values in this interval not displayed.     Results from last 7 days   Lab Units 05/07/25 0507 05/06/25 0519 05/05/25 0426   SODIUM mmol/L 135   < > 136   POTASSIUM mmol/L 3.3*   < > 4.3   CHLORIDE mmol/L 100   < > 99   CO2 mmol/L 27   < > 30   BUN mg/dL 16   < > 16   CREATININE mg/dL 0.78   < > 0.89   ANION GAP mmol/L 8   < > 7   CALCIUM mg/dL 7.7*   < > 9.4   ALBUMIN g/dL  --   --  4.2   TOTAL BILIRUBIN mg/dL  --   --  0.56   ALK PHOS U/L  --   --  60   ALT U/L  --   --  13   AST U/L  --   --  13   GLUCOSE RANDOM mg/dL 113   < > 135    < > = values in this interval not displayed.     Results from last 7 days   Lab Units 05/05/25  0426   INR  0.96             Results from last 7 days   Lab Units 05/06/25 0519 05/05/25  0652 05/05/25 0426   LACTIC ACID mmol/L  --  2.0 2.1*   PROCALCITONIN ng/ml 3.10*  --  0.91*       Recent Cultures (last 7 days):   Results from last 7 days   Lab Units 05/05/25 0426   BLOOD CULTURE  No Growth at 48 hrs.  No Growth at 48 hrs.       Imaging Results Review: No pertinent imaging studies reviewed.  Other Study Results Review: No additional pertinent studies reviewed.    Last 24 Hours Medication List:      Current Facility-Administered Medications:     acetaminophen (TYLENOL) tablet 650 mg, Q4H PRN    acetaminophen (TYLENOL) tablet 975 mg, Once    atorvastatin (LIPITOR) tablet 20 mg, Daily With Dinner    ceFAZolin (ANCEF) IVPB (premix in dextrose) 2,000 mg 50 mL, Q8H, Last Rate: 2,000 mg (05/07/25 0837)    enoxaparin (LOVENOX) subcutaneous injection 40 mg, BID    fluticasone-vilanterol 200-25 mcg/actuation 1 puff, Daily    levothyroxine tablet 200 mcg, Early Morning    levothyroxine tablet 75 mcg, Early Morning    losartan (COZAAR) tablet 100 mg, Daily    ondansetron (ZOFRAN) injection 4 mg, Q6H PRN    Administrative Statements   Today, Patient Was Seen By: Augustin Almeida MD      **Please Note: This note may have been constructed using a voice recognition system.**

## 2025-05-07 NOTE — ASSESSMENT & PLAN NOTE
E/b fever, tachycardia, tachypnea, leukocytosis.  Most likely due to right lower extremity cellulitis.  Fortunately blood cultures x 2 are negative so far.  He is otherwise hemodynamically stable.  Leukocytosis is resolved.  Temp curve is down trending. Clinically right lower extremity also improving.  Continue IV antibiotics as below  Follow-up blood cultures  Trend temps and hemodynamics  Monitor daily labs for response to treatment and for developing toxicities

## 2025-05-07 NOTE — ASSESSMENT & PLAN NOTE
Sepsis parameters have improved  Secondary to a right thigh cellulitis  Cellulitis is improving  White blood cell count has normalized, Tmax over the past 24 hours-101 °F  Continue IV cefazolin 2 g every 8 hours day #3  CT right lower extremity reviewed  Blood cultures x 2 sets-no growth at 48 hours  Appreciate ID input  Hopeful discharge planning in 24 hours

## 2025-05-07 NOTE — PROGRESS NOTES
Progress Note - Infectious Disease   Name: Haja Shen 59 y.o. male I MRN: [id number]  Unit/Bed#: -01 I Date of Admission: 5/5/2025   Date of Service: 5/7/2025 I Hospital Day: 2    Assessment & Plan  Sepsis (HCC)  E/b fever, tachycardia, tachypnea, leukocytosis.  Most likely due to right lower extremity cellulitis.  Fortunately blood cultures x 2 are negative so far.  He is otherwise hemodynamically stable.  Leukocytosis is resolved.  Temp curve is down trending. Clinically right lower extremity also improving.  Continue IV antibiotics as below  Follow-up blood cultures  Trend temps and hemodynamics  Monitor daily labs for response to treatment and for developing toxicities  Cellulitis of right leg  Uncertain if this represents a recurrent cellulitis versus persistent symptoms due to suboptimal treatment courses.  Presenting with right medial thigh erythema, warmth, swelling and tenderness with associated fevers.  Ongoing since early March 2025.  Minimal improvement with Keflex 500 mg 4 times daily x 7 days.  PCP concerned for Lyme despite no known tick exposures and treated with 21-day course of p.o. doxycycline with some improvement.  Initial Lyme total antibody positive with negative reflex.  Subsequent testing negative.  Overall suspicion for Lyme is low without any obvious exposure and negative testing.  Appearance and distribution of erythema/cellulitis does appear streptococcal in nature.  He has chronic lower extremity venous stasis and likely underlying lymphedema which will certainly put him at risk for recurrent streptococcal cellulitis.  CT negative for underlying abscess or soft tissue emphysema.  Fortunately blood cultures are negative and cellulitis so far improving with IV cefazolin.  Continue IV cefazolin 2 g every 8 hours  Serial skin exams  Continue to follow-up blood cultures  Encourage lower extremity elevation, compression, weight loss  Anticipate transition to p.o. antibiotics in the  next 24 hours  Venous insufficiency of both lower extremities  Suspected venous insufficiency of bilateral lower extremities with evidence of chronic nonpitting edema, venous stasis changes to the skin with dermatitis and varicosities on exam.  Obesity, Class III, BMI 40-49.9 (morbid obesity)  BMI 40.63.  Discussed weight loss, lifestyle modifications with patient and daughter at bedside.  Recommended follow-up with PCP for consideration of weight loss drugs.    I have discussed the above management plan in detail with the primary service.     Antibiotics:  D3 IV cefazolin     Subjective   Patient has no fever, chills, sweats; no nausea, vomiting, diarrhea; no cough, shortness of breath; no pain. No new symptoms. Tolerating the abx. Cellulitis slowly improving.    Objective :  Temp:  [97.8 °F (36.6 °C)-101 °F (38.3 °C)] 98.6 °F (37 °C)  HR:  [68-97] 68  BP: (109-146)/(66-86) 123/66  Resp:  [17-18] 17  SpO2:  [94 %-96 %] 94 %  O2 Device: None (Room air)    Physical exam:  General:  No acute distress, sitting in bedside recliner chatting with his daughter  Psychiatric:  Awake and alert, pleasant and cooperative.  HEENT: Neck supple, mucous membranes moist, head normocephalic  Cardiovascular: Regular rate and rhythm no murmur  Pulmonary: On room air, no dyspnea with conversation or labored breathing  Abdomen:  Soft, nontender, obese abdomen  Extremities: Chronic lower extremity nonpitting edema  Skin:  No rashes.  Bilateral lower extremity venous stasis dermatitis.  Right medial thigh erythema noted, sparing the right knee, likely due to vitiligo.      Lab Results: I have reviewed the following results:  Results from last 7 days   Lab Units 05/07/25  0507 05/06/25  0519 05/05/25  0426   WBC Thousand/uL 9.67 12.71* 14.03*   HEMOGLOBIN g/dL 10.9* 12.7 13.4   PLATELETS Thousands/uL 116* 130* 169     Results from last 7 days   Lab Units 05/07/25  0507 05/06/25  0519 05/05/25  0426   SODIUM mmol/L 135 132* 136   POTASSIUM  mmol/L 3.3* 3.7 4.3   CHLORIDE mmol/L 100 98 99   CO2 mmol/L 27 28 30   BUN mg/dL 16 17 16   CREATININE mg/dL 0.78 1.08 0.89   EGFR ml/min/1.73sq m 100 75 94   CALCIUM mg/dL 7.7* 8.0* 9.4   AST U/L  --   --  13   ALT U/L  --   --  13   ALK PHOS U/L  --   --  60   ALBUMIN g/dL  --   --  4.2     Results from last 7 days   Lab Units 05/05/25  0426   BLOOD CULTURE  No Growth at 48 hrs.  No Growth at 48 hrs.     Results from last 7 days   Lab Units 05/06/25  0519 05/05/25  0426   PROCALCITONIN ng/ml 3.10* 0.91*                 Imaging Results Review: No pertinent imaging studies reviewed.  Other Study Results Review: Other studies reviewed include: micro

## 2025-05-07 NOTE — CASE MANAGEMENT
Case Management Assessment & Discharge Planning Note    Patient name Haja Shen  Location /-01 MRN [id number]  : 1965 Date 2025       Current Admission Date: 2025  Current Admission Diagnosis:Sepsis due to cellulitis (HCC)   Patient Active Problem List    Diagnosis Date Noted Date Diagnosed    Sepsis (HCC) 2025     Cellulitis of right leg 2025     Venous insufficiency of both lower extremities 2025     Sepsis due to cellulitis (HCC) 2025     Vitamin B12 deficiency 2024     Prediabetes 2022     Obesity, Class III, BMI 40-49.9 (morbid obesity) 2019     Benign essential HTN 2017     HLD (hyperlipidemia) 2017     Hypothyroid 2017     Asthma 2017     Primary osteoarthritis of both knees 2017       LOS (days): 2  Geometric Mean LOS (GMLOS) (days): 3.5  Days to GMLOS:1.4     OBJECTIVE:    Risk of Unplanned Readmission Score: 8.17         Current admission status: Inpatient  Referral Reason: Other (Discharge planning)    Preferred Pharmacy:   [id number] [place], [place] - [address] [place] [id number]  Phone: [phone] Fax: [phone]    Primary Care Provider: Idalia Peñaloza MD    Primary Insurance: BLUE CROSS  Secondary Insurance:     ASSESSMENT:  Active Health Care Proxies    There are no active Health Care Proxies on file.       Advance Directives  Does patient have a Health Care POA?: No  Was patient offered paperwork?: Yes  Does patient currently have a Health Care decision maker?: Yes, please see Health Care Proxy section  Does patient have Advance Directives?: No  Was patient offered paperwork?: Yes  Primary Contact: Gaby Limaopp - Spouse         Readmission Root Cause  30 Day Readmission: No    Patient Information  Admitted from:: Home  Mental Status: Alert  During Assessment patient was accompanied by: Not accompanied during assessment  Assessment information  provided by:: Patient  Primary Caregiver: Self  Support Systems: Spouse/significant other  County of Residence: [place]  What city do you live in?: [place]  Home entry access options. Select all that apply.: Stairs  Number of steps to enter home.: 10  Do the steps have railings?: Yes  Type of Current Residence: [facility]  Living Arrangements: Lives w/ Spouse/significant other  Is patient a ?: No    Activities of Daily Living Prior to Admission  Functional Status: Independent  Completes ADLs independently?: Yes  Ambulates independently?: Yes  Does patient use assisted devices?: No  Does patient currently own DME?: No  Does patient have a history of Outpatient Therapy (PT/OT)?: No  Does the patient have a history of Short-Term Rehab?: No  Does patient have a history of HHC?: No  Does patient currently have HHC?: No         Patient Information Continued  Income Source: Employed  Does patient have prescription coverage?: Yes  Can the patient afford their medications and any related supplies (such as glucometers or test strips)?: Yes  Does patient receive dialysis treatments?: No  Does patient have a history of substance abuse?: No  Does patient have a history of Mental Health Diagnosis?: No         Means of Transportation  Means of Transport to Appts:: Drives Self          DISCHARGE DETAILS:    Discharge planning discussed with:: Patient  Freedom of Choice: Yes  Comments - Freedom of Choice: Pt completely independent with ADLs prior to admission. Pt has no identified CM dischagre needs at this time. CM to follow as needed.  CM contacted family/caregiver?: Yes  Were Treatment Team discharge recommendations reviewed with patient/caregiver?: Yes  Did patient/caregiver verbalize understanding of patient care needs?: Yes  Were patient/caregiver advised of the risks associated with not following Treatment Team discharge recommendations?: Yes    Contacts  Patient Contacts: Gaby Shen, wife  Relationship to Patient::  Family  Contact Method: Phone  Phone Number: [phone]  Reason/Outcome: Discharge Planning    Requested Home Health Care         Is the patient interested in HHC at discharge?: No    DME Referral Provided  Referral made for DME?: No         Would you like to participate in our [facility] service program?  : No - Declined    Treatment Team Recommendation: Home  Discharge Destination Plan:: Home  Transport at Discharge : Family

## 2025-05-08 VITALS
HEIGHT: 72 IN | HEART RATE: 78 BPM | WEIGHT: 299.61 LBS | TEMPERATURE: 98.5 F | SYSTOLIC BLOOD PRESSURE: 146 MMHG | BODY MASS INDEX: 40.58 KG/M2 | OXYGEN SATURATION: 95 % | RESPIRATION RATE: 20 BRPM | DIASTOLIC BLOOD PRESSURE: 78 MMHG

## 2025-05-08 LAB
ANION GAP SERPL CALCULATED.3IONS-SCNC: 7 MMOL/L (ref 4–13)
BASOPHILS # BLD AUTO: 0.04 THOUSANDS/ÂΜL (ref 0–0.1)
BASOPHILS NFR BLD AUTO: 1 % (ref 0–1)
BUN SERPL-MCNC: 12 MG/DL (ref 5–25)
CALCIUM SERPL-MCNC: 7.9 MG/DL (ref 8.4–10.2)
CHLORIDE SERPL-SCNC: 101 MMOL/L (ref 96–108)
CO2 SERPL-SCNC: 28 MMOL/L (ref 21–32)
CREAT SERPL-MCNC: 0.76 MG/DL (ref 0.6–1.3)
EOSINOPHIL # BLD AUTO: 0.18 THOUSAND/ÂΜL (ref 0–0.61)
EOSINOPHIL NFR BLD AUTO: 2 % (ref 0–6)
ERYTHROCYTE [DISTWIDTH] IN BLOOD BY AUTOMATED COUNT: 17 % (ref 11.6–15.1)
GFR SERPL CREATININE-BSD FRML MDRD: 101 ML/MIN/1.73SQ M
GLUCOSE SERPL-MCNC: 112 MG/DL (ref 65–140)
HCT VFR BLD AUTO: 35.2 % (ref 36.5–49.3)
HGB BLD-MCNC: 10.6 G/DL (ref 12–17)
IMM GRANULOCYTES # BLD AUTO: 0.04 THOUSAND/UL (ref 0–0.2)
IMM GRANULOCYTES NFR BLD AUTO: 1 % (ref 0–2)
LYMPHOCYTES # BLD AUTO: 1.18 THOUSANDS/ÂΜL (ref 0.6–4.47)
LYMPHOCYTES NFR BLD AUTO: 15 % (ref 14–44)
MCH RBC QN AUTO: 25.4 PG (ref 26.8–34.3)
MCHC RBC AUTO-ENTMCNC: 30.1 G/DL (ref 31.4–37.4)
MCV RBC AUTO: 84 FL (ref 82–98)
MONOCYTES # BLD AUTO: 1.04 THOUSAND/ÂΜL (ref 0.17–1.22)
MONOCYTES NFR BLD AUTO: 13 % (ref 4–12)
NEUTROPHILS # BLD AUTO: 5.51 THOUSANDS/ÂΜL (ref 1.85–7.62)
NEUTS SEG NFR BLD AUTO: 68 % (ref 43–75)
NRBC BLD AUTO-RTO: 0 /100 WBCS
PLATELET # BLD AUTO: 125 THOUSANDS/UL (ref 149–390)
PMV BLD AUTO: 9.9 FL (ref 8.9–12.7)
POTASSIUM SERPL-SCNC: 3.7 MMOL/L (ref 3.5–5.3)
RBC # BLD AUTO: 4.17 MILLION/UL (ref 3.88–5.62)
SODIUM SERPL-SCNC: 136 MMOL/L (ref 135–147)
WBC # BLD AUTO: 7.99 THOUSAND/UL (ref 4.31–10.16)

## 2025-05-08 PROCEDURE — 85025 COMPLETE CBC W/AUTO DIFF WBC: CPT | Performed by: HOSPITALIST

## 2025-05-08 PROCEDURE — 99239 HOSP IP/OBS DSCHRG MGMT >30: CPT | Performed by: HOSPITALIST

## 2025-05-08 PROCEDURE — 99232 SBSQ HOSP IP/OBS MODERATE 35: CPT | Performed by: INTERNAL MEDICINE

## 2025-05-08 PROCEDURE — 80048 BASIC METABOLIC PNL TOTAL CA: CPT | Performed by: HOSPITALIST

## 2025-05-08 PROCEDURE — G0545 PR INHERENT VISIT TO INPT: HCPCS | Performed by: INTERNAL MEDICINE

## 2025-05-08 RX ORDER — CEPHALEXIN 500 MG/1
1000 CAPSULE ORAL EVERY 6 HOURS SCHEDULED
Qty: 60 CAPSULE | Refills: 0 | Status: SHIPPED | OUTPATIENT
Start: 2025-05-08 | End: 2025-05-16

## 2025-05-08 RX ORDER — CEPHALEXIN 500 MG/1
1000 CAPSULE ORAL EVERY 6 HOURS SCHEDULED
Status: DISCONTINUED | OUTPATIENT
Start: 2025-05-08 | End: 2025-05-08 | Stop reason: HOSPADM

## 2025-05-08 RX ADMIN — FLUTICASONE FUROATE AND VILANTEROL TRIFENATATE 1 PUFF: 200; 25 POWDER RESPIRATORY (INHALATION) at 10:45

## 2025-05-08 RX ADMIN — LOSARTAN POTASSIUM 100 MG: 50 TABLET, FILM COATED ORAL at 09:31

## 2025-05-08 RX ADMIN — CEFAZOLIN SODIUM 2000 MG: 2 SOLUTION INTRAVENOUS at 09:31

## 2025-05-08 RX ADMIN — ENOXAPARIN SODIUM 40 MG: 40 INJECTION SUBCUTANEOUS at 09:31

## 2025-05-08 RX ADMIN — LEVOTHYROXINE SODIUM 75 MCG: 75 TABLET ORAL at 05:18

## 2025-05-08 RX ADMIN — LEVOTHYROXINE SODIUM 200 MCG: 100 TABLET ORAL at 05:18

## 2025-05-08 RX ADMIN — CEFAZOLIN SODIUM 2000 MG: 2 SOLUTION INTRAVENOUS at 01:01

## 2025-05-08 NOTE — DISCHARGE SUMMARY
Discharge Summary - Hospitalist   Name: Haja Shen 59 y.o. male I MRN: [id number]  Unit/Bed#: -01 I Date of Admission: 5/5/2025   Date of Service: 5/8/2025 I Hospital Day: 3     Assessment & Plan  Sepsis due to cellulitis (HCC)  Sepsis parameters have improved  Secondary to a right thigh cellulitis  Cellulitis is improving  White blood cell count has normalized, Tmax over the past 24 hours- afebrile  Status post 4 days worth of IV cefazolin that was given 2 g every 8 hours  CT right lower extremity reviewed  Blood cultures x 2 sets-no growth at 72 hours  Cleared for discharge by ID  DC home on Keflex 1000 mg p.o. every 6 hours through 5/16/2025  Outpatient follow-up with PCP and ID  No changes to any of his other preadmission medications, and/or to the preadmission doses  Benign essential HTN  Blood pressure is stable  DC home on losartan as the patient was taking prior to arrival  HLD (hyperlipidemia)  DC home on statin therapy as the patient was utilizing prior to arrival  Hypothyroid  Continue levothyroxine in the post discharge setting without any dose changes  Obesity, Class III, BMI 40-49.9 (morbid obesity)  Actual BMI of 40.63  Dietary, weight loss, and lifestyle modification counseling was provided  Asthma  Currently without exacerbation  DC home on preadmit meds at preadmit doses  Hypokalemia  Resolved with repletion     Medical Problems       Resolved Problems  Date Reviewed: 5/5/2025   None       Discharging Physician / Practitioner: Augustin Almeida MD  PCP: Idalia Peñaloza MD  Admission Date:   Admission Orders (From admission, onward)       Ordered        05/05/25 0453  INPATIENT ADMISSION  Once                          Discharge Date: 05/08/25    Consultations During Hospital Stay:  Infectious disease    Procedures Performed:   None    Significant Findings / Test Results:   CT right lower extremity-Diffuse nonspecific subcutaneous edema may represent cellulitis in the appropriate clinical  setting. No evidence of abscess. No evidence of deep soft tissue infection.     Incidental Findings:   None    Test Results Pending at Discharge (will require follow up):   None     Outpatient Tests Requested:  None    Complications: None    Reason for Admission: Sepsis secondary to right thigh cellulitis    Hospital Course:   Haja Shen is a 59 y.o. male patient who originally presented to the hospital on 5/5/2025 due to fevers, body aches, and a rash on his right thigh.  Please refer to the initial history and physical examination completed by Cecilia wolfe PA-C for the initial presenting features and complaints.  In brief, the patient is a 59-year-old male who was admitted to the hospital for a right thigh cellulitis.  Imaging studies were performed with results as outlined above.  Patient was seen in conjunction with infectious disease.  He completed 4 days worth of IV antibiotics here in the hospital and then was ultimately transitioned over to p.o. Keflex at time of discharge.  He will be following up in the near future in the outpatient setting with his PCP and ID.  No other changes were made to any of his preadmission medications, and/or to the preadmission doses.  Please refer to the assessment/plan portion of this discharge summary as outlined above for additional details regarding his stay.      Please see above list of diagnoses and related plan for additional information.     Condition at Discharge: good    Discharge Day Visit / Exam:   Subjective: Patient seen, looks and feels great, wants to be discharged today since it [birth year] tomorrow  Vitals: Blood Pressure: 146/78 (05/08/25 0700)  Pulse: 78 (05/08/25 0700)  Temperature: 98.5 °F (36.9 °C) (05/08/25 0700)  Temp Source: Oral (05/07/25 1815)  Respirations: 20 (05/08/25 0700)  Height: 6' (182.9 cm) (05/05/25 0513)  Weight - Scale: 136 kg (299 lb 9.7 oz) (05/05/25 0513)  SpO2: 95 % (05/08/25 0700)  Physical Exam  Vitals and nursing note  reviewed.   Constitutional:       General: He is not in acute distress.     Appearance: Normal appearance. He is not ill-appearing.   HENT:      Head: Normocephalic and atraumatic.      Nose: Nose normal.   Eyes:      Extraocular Movements: Extraocular movements intact.      Pupils: Pupils are equal, round, and reactive to light.   Cardiovascular:      Rate and Rhythm: Normal rate and regular rhythm.      Pulses: Normal pulses.      Heart sounds: Normal heart sounds. No murmur heard.     No friction rub. No gallop.   Pulmonary:      Effort: Pulmonary effort is normal.      Breath sounds: Normal breath sounds.   Abdominal:      General: There is no distension.      Palpations: Abdomen is soft. There is no mass.      Tenderness: There is no abdominal tenderness. There is no guarding or rebound.   Musculoskeletal:         General: No swelling or tenderness. Normal range of motion.      Cervical back: Normal range of motion and neck supple. No rigidity. No muscular tenderness.      Right lower leg: No edema.      Left lower leg: No edema.      Comments: Significantly improved right thigh cellulitis   Skin:     General: Skin is warm.      Capillary Refill: Capillary refill takes less than 2 seconds.      Findings: No erythema or rash.   Neurological:      General: No focal deficit present.      Mental Status: He is alert and oriented to person, place, and time. Mental status is at baseline.   Psychiatric:         Mood and Affect: Mood normal.         Behavior: Behavior normal.          Discussion with Family: Updated  (daughter) at bedside.    Discharge instructions/Information to patient and family:   See after visit summary for information provided to patient and family.      Provisions for Follow-Up Care:  See after visit summary for information related to follow-up care and any pertinent home health orders.      Mobility at time of Discharge:   Basic Mobility Inpatient Raw Score: 24  -[facility] Goal: 8:  Walk 250 feet or more  [facility]-HLM Achieved: 7: Walk 25 feet or more  HLM Goal achieved. Continue to encourage appropriate mobility.     Disposition:   Home    Planned Readmission: None    Discharge Medications:  See after visit summary for reconciled discharge medications provided to patient and/or family.      Administrative Statements   Discharge Statement:  I have spent a total time of 40 minutes in caring for this patient on the day of the visit/encounter. >30 minutes of time was spent on: Diagnostic results, Prognosis, Risks and benefits of tx options, Instructions for management, Patient and family education, Importance of tx compliance, Risk factor reductions, Impressions, Counseling / Coordination of care, Documenting in the medical record, Reviewing / ordering tests, medicine, procedures  , and Communicating with other healthcare professionals .    **Please Note: This note may have been constructed using a voice recognition system**

## 2025-05-08 NOTE — ASSESSMENT & PLAN NOTE
E/b fever, tachycardia, tachypnea, leukocytosis.  Most likely due to right lower extremity cellulitis.  Fortunately blood cultures x 2 are negative so far.  He is otherwise hemodynamically stable.  Leukocytosis and fever resolved. Clinically right lower extremity also improving.  Continue antibiotics as below  Continue to follow-up blood cultures for completeness   Trend temps and hemodynamics  Monitor daily labs for response to treatment and for developing toxicities

## 2025-05-08 NOTE — ASSESSMENT & PLAN NOTE
Sepsis parameters have improved  Secondary to a right thigh cellulitis  Cellulitis is improving  White blood cell count has normalized, Tmax over the past 24 hours- afebrile  Status post 4 days worth of IV cefazolin that was given 2 g every 8 hours  CT right lower extremity reviewed  Blood cultures x 2 sets-no growth at 72 hours  Cleared for discharge by ID  DC home on Keflex 1000 mg p.o. every 6 hours through 5/16/2025  Outpatient follow-up with PCP and ID  No changes to any of his other preadmission medications, and/or to the preadmission doses

## 2025-05-08 NOTE — ASSESSMENT & PLAN NOTE
Uncertain if this represents a recurrent cellulitis versus persistent symptoms due to suboptimal treatment courses.  Presenting with right medial thigh erythema, warmth, swelling and tenderness with associated fevers.  Ongoing since early March 2025.  Minimal improvement with Keflex 500 mg 4 times daily x 7 days.  PCP concerned for Lyme despite no known tick exposures and treated with 21-day course of p.o. doxycycline with some improvement.  Initial Lyme total antibody positive with negative reflex.  Subsequent testing negative.  Overall suspicion for Lyme is low without any obvious exposure and negative testing.  Appearance and distribution of erythema/cellulitis does appear streptococcal in nature.  He has chronic lower extremity venous stasis and likely underlying lymphedema which will certainly put him at risk for recurrent streptococcal cellulitis.  CT negative for underlying abscess or soft tissue emphysema.  Fortunately blood cultures are negative and cellulitis so far improving with IV cefazolin.  Continue IV cefazolin 2 g every 8 hours  On discharge, can transition to high dose keflex 1g QID to complete 10 day course of total abx   Serial skin exams  Continue to follow-up blood cultures  Encourage lower extremity elevation, compression, weight loss

## 2025-05-08 NOTE — NURSING NOTE
Patient was discharged with daughter. IV was removed without complications. AVS was reviewed with the patient and his daughter, verbalized understanding of. All patient belongings returned with the patient.

## 2025-05-08 NOTE — DISCHARGE INSTR - AVS FIRST PAGE
Dear Haja Shen,     It was our pleasure to care for you here at [facility].  It is our hope that we were always able to exceed the expected standards for your care during your stay.  You were hospitalized due to sepsis secondary to a right thigh cellulitis.  You were cared for on the medical/surgical floor by Augustin Almeida MD with the [facility] who covers for your primary care physician (PCP), Idalia Peñaloza MD, while you were hospitalized.  You were additionally seen by the [facility].  If you have any questions or concerns related to this hospitalization, you may contact us at .  For follow up as well as any medication refills, we recommend that you follow up with your primary care physician.  A registered nurse will reach out to you by phone within a few days after your discharge to answer any additional questions that you may have after going home.  However, at this time we provide for you here, the most important instructions / recommendations at discharge:     Notable Medication Adjustments -   New prescription Keflex 1000 mg every 6 hours to be taken through 5/16/2025  Okay to resume all other preadmission medications at the preadmission doses without change  Testing Required after Discharge -   To be further determined in the near future in the outpatient setting by your primary care provider  Important follow up information -   Please follow-up with the providers as outlined in this discharge packet  Other Instructions -   Please maintain a healthy diabetic diet  Please review this entire after visit summary as additional general instructions including medication list, appointments, activity, diet, any pertinent wound care, and other additional recommendations from your care team that may be provided for you.      Sincerely,     Augustin Almeida MD

## 2025-05-08 NOTE — PROGRESS NOTES
Progress Note - Infectious Disease   Name: Haja Shen 59 y.o. male I MRN: [id number]  Unit/Bed#: -01 I Date of Admission: 5/5/2025   Date of Service: 5/8/2025 I Hospital Day: 3    Assessment & Plan  Sepsis (HCC)  E/b fever, tachycardia, tachypnea, leukocytosis.  Most likely due to right lower extremity cellulitis.  Fortunately blood cultures x 2 are negative so far.  He is otherwise hemodynamically stable.  Leukocytosis and fever resolved. Clinically right lower extremity also improving.  Continue antibiotics as below  Continue to follow-up blood cultures for completeness   Trend temps and hemodynamics  Monitor daily labs for response to treatment and for developing toxicities  Cellulitis of right leg  Uncertain if this represents a recurrent cellulitis versus persistent symptoms due to suboptimal treatment courses.  Presenting with right medial thigh erythema, warmth, swelling and tenderness with associated fevers.  Ongoing since early March 2025.  Minimal improvement with Keflex 500 mg 4 times daily x 7 days.  PCP concerned for Lyme despite no known tick exposures and treated with 21-day course of p.o. doxycycline with some improvement.  Initial Lyme total antibody positive with negative reflex.  Subsequent testing negative.  Overall suspicion for Lyme is low without any obvious exposure and negative testing.  Appearance and distribution of erythema/cellulitis does appear streptococcal in nature.  He has chronic lower extremity venous stasis and likely underlying lymphedema which will certainly put him at risk for recurrent streptococcal cellulitis.  CT negative for underlying abscess or soft tissue emphysema.  Fortunately blood cultures are negative and cellulitis so far improving with IV cefazolin.  Continue IV cefazolin 2 g every 8 hours  On discharge, can transition to high dose keflex 1g QID to complete 10 day course of total abx   Serial skin exams  Continue to follow-up blood cultures  Encourage  lower extremity elevation, compression, weight loss  Venous insufficiency of both lower extremities  Suspected venous insufficiency of bilateral lower extremities with evidence of chronic nonpitting edema, venous stasis changes to the skin with dermatitis and varicosities on exam.  Obesity, Class III, BMI 40-49.9 (morbid obesity)  BMI 40.63.  Discussed weight loss, lifestyle modifications with patient and daughter at bedside.  Recommended follow-up with PCP for consideration of weight loss drugs.    I have discussed the above management plan in detail with the primary service.     Antibiotics:  IV abx D4     Subjective   Patient has no fever, chills, sweats; no nausea, vomiting, diarrhea; no cough, shortness of breath; no pain. No new symptoms. Tolerating abx. Erythema, warmth and swelling improving.     Objective :  Temp:  [98.5 °F (36.9 °C)-99.1 °F (37.3 °C)] 98.5 °F (36.9 °C)  HR:  [68-78] 78  BP: (121-146)/(66-78) 146/78  Resp:  [16-20] 20  SpO2:  [94 %-98 %] 95 %  O2 Device: None (Room air)    Physical exam:  General:  No acute distress, sitting in bedside recliner chatting with his daughter  Psychiatric:  Awake and alert, pleasant and cooperative.  HEENT: Neck supple, mucous membranes moist, head normocephalic  Cardiovascular: Regular rate and rhythm no murmur  Pulmonary: On room air, no dyspnea with conversation or labored breathing  Abdomen:  Soft, nontender, obese abdomen  Extremities: Chronic lower extremity nonpitting edema  Skin:  No rashes.  Bilateral lower extremity venous stasis dermatitis.  Right medial thigh erythema noted with out warmth or tenderness and overall improving with patchiness and areas of improvement, sparing the right knee, likely due to vitiligo      Lab Results: I have reviewed the following results:  Results from last 7 days   Lab Units 05/08/25  0428 05/07/25  0507 05/06/25  0519   WBC Thousand/uL 7.99 9.67 12.71*   HEMOGLOBIN g/dL 10.6* 10.9* 12.7   PLATELETS Thousands/uL 125*  116* 130*     Results from last 7 days   Lab Units 05/08/25  0428 05/07/25  0507 05/06/25  0519 05/05/25  0426   SODIUM mmol/L 136 135 132* 136   POTASSIUM mmol/L 3.7 3.3* 3.7 4.3   CHLORIDE mmol/L 101 100 98 99   CO2 mmol/L 28 27 28 30   BUN mg/dL 12 16 17 16   CREATININE mg/dL 0.76 0.78 1.08 0.89   EGFR ml/min/1.73sq m 101 100 75 94   CALCIUM mg/dL 7.9* 7.7* 8.0* 9.4   AST U/L  --   --   --  13   ALT U/L  --   --   --  13   ALK PHOS U/L  --   --   --  60   ALBUMIN g/dL  --   --   --  4.2     Results from last 7 days   Lab Units 05/05/25  0426   BLOOD CULTURE  No Growth at 72 hrs.  No Growth at 72 hrs.     Results from last 7 days   Lab Units 05/06/25 0519 05/05/25  0426   PROCALCITONIN ng/ml 3.10* 0.91*                 Imaging Results Review: No pertinent imaging studies reviewed.  Other Study Results Review: Other studies reviewed include: micro

## 2025-05-10 LAB
BACTERIA BLD CULT: NORMAL
BACTERIA BLD CULT: NORMAL

## 2025-05-13 ENCOUNTER — TELEPHONE (OUTPATIENT)
Age: 60
End: 2025-05-13

## 2025-05-13 NOTE — TELEPHONE ENCOUNTER
Odalys is patients  with his insurance plan, she was following top make sure patient was scheduled for his TCM apt, confirmed for 05/15.   If we are in need of any help with coordination of care please reach out to her directly at the number provided.   Thank you

## 2025-05-15 ENCOUNTER — TELEPHONE (OUTPATIENT)
Age: 60
End: 2025-05-15

## 2025-05-15 NOTE — TELEPHONE ENCOUNTER
Thank you for letting me know.    Called pt to offer virtual TCM since pt was having car issues.  Pt did not answer and voicemail was full.

## 2025-05-15 NOTE — TELEPHONE ENCOUNTER
Pt cx appt for TCM sched for today, 5/15/25. Pt did not want to resched and will call back. Pt stated he is having vehicle issues.

## 2025-05-21 ENCOUNTER — OFFICE VISIT (OUTPATIENT)
Age: 60
End: 2025-05-21
Payer: COMMERCIAL

## 2025-05-21 VITALS
TEMPERATURE: 98.3 F | DIASTOLIC BLOOD PRESSURE: 76 MMHG | HEART RATE: 63 BPM | WEIGHT: 303.8 LBS | BODY MASS INDEX: 41.2 KG/M2 | SYSTOLIC BLOOD PRESSURE: 157 MMHG | OXYGEN SATURATION: 96 %

## 2025-05-21 DIAGNOSIS — I87.2 VENOUS INSUFFICIENCY OF BOTH LOWER EXTREMITIES: ICD-10-CM

## 2025-05-21 DIAGNOSIS — L03.115 CELLULITIS OF RIGHT LEG: Primary | ICD-10-CM

## 2025-05-21 DIAGNOSIS — E66.813 OBESITY, CLASS III, BMI 40-49.9 (MORBID OBESITY): ICD-10-CM

## 2025-05-21 PROCEDURE — 99213 OFFICE O/P EST LOW 20 MIN: CPT | Performed by: PHYSICIAN ASSISTANT

## 2025-05-21 NOTE — ASSESSMENT & PLAN NOTE
BMI 40.63. Discussed weight loss, lifestyle modifications with patient. Recommended follow-up with PCP for consideration of weight loss drugs or referral to weight management.

## 2025-05-21 NOTE — ASSESSMENT & PLAN NOTE
Recent admission for sepsis 2/2 cellulitis. Uncertain if this represents a recurrent cellulitis versus persistent symptoms due to suboptimal treatment courses.  Reported right medial thigh erythema, warmth, swelling and tenderness with associated fevers since early March 2025.  Minimal improvement with Keflex 500 mg 4 times daily x 7 days.  PCP concerned for Lyme despite no known tick exposures and treated with 21-day course of p.o. doxycycline with some improvement.  Initial Lyme total antibody positive with negative reflex.  Subsequent testing negative.  Overall suspicion for Lyme is low without any obvious exposure and negative testing.  Appearance and distribution of erythema/cellulitis appeared streptococcal.  He has chronic lower extremity venous stasis and likely underlying lymphedema which will certainly put him at risk for recurrent streptococcal cellulitis.  He completed treatment course with PO Keflex. Sx resolved. Some thigh swelling persists but overall Improved.   No additional abx required   Follow up PRN   Encourage lower extremity elevation, compression, weight loss

## 2025-06-04 PROBLEM — L03.90 SEPSIS DUE TO CELLULITIS (HCC): Status: RESOLVED | Noted: 2025-05-05 | Resolved: 2025-06-04

## 2025-06-04 PROBLEM — A41.9 SEPSIS DUE TO CELLULITIS (HCC): Status: RESOLVED | Noted: 2025-05-05 | Resolved: 2025-06-04

## 2025-06-05 PROBLEM — A41.9 SEPSIS (HCC): Status: RESOLVED | Noted: 2025-05-06 | Resolved: 2025-06-05

## 2025-06-16 ENCOUNTER — CLINICAL SUPPORT (OUTPATIENT)
Dept: INTERNAL MEDICINE CLINIC | Age: 60
End: 2025-06-16
Payer: COMMERCIAL

## 2025-06-16 DIAGNOSIS — E53.8 VITAMIN B12 DEFICIENCY: Primary | ICD-10-CM

## 2025-06-16 PROCEDURE — 96372 THER/PROPH/DIAG INJ SC/IM: CPT

## 2025-06-16 RX ADMIN — CYANOCOBALAMIN 1000 MCG: 1000 INJECTION, SOLUTION INTRAMUSCULAR; SUBCUTANEOUS at 15:57

## 2025-07-17 ENCOUNTER — OFFICE VISIT (OUTPATIENT)
Dept: INTERNAL MEDICINE CLINIC | Age: 60
End: 2025-07-17
Payer: COMMERCIAL

## 2025-07-17 VITALS
WEIGHT: 300.2 LBS | HEIGHT: 72 IN | DIASTOLIC BLOOD PRESSURE: 80 MMHG | TEMPERATURE: 98.1 F | OXYGEN SATURATION: 98 % | BODY MASS INDEX: 40.66 KG/M2 | HEART RATE: 67 BPM | SYSTOLIC BLOOD PRESSURE: 138 MMHG

## 2025-07-17 DIAGNOSIS — Z00.00 ANNUAL PHYSICAL EXAM: Primary | ICD-10-CM

## 2025-07-17 DIAGNOSIS — E53.8 VITAMIN B12 DEFICIENCY: ICD-10-CM

## 2025-07-17 PROCEDURE — 96372 THER/PROPH/DIAG INJ SC/IM: CPT | Performed by: INTERNAL MEDICINE

## 2025-07-17 PROCEDURE — 99396 PREV VISIT EST AGE 40-64: CPT | Performed by: INTERNAL MEDICINE

## 2025-07-17 RX ADMIN — CYANOCOBALAMIN 1000 MCG: 1000 INJECTION, SOLUTION INTRAMUSCULAR; SUBCUTANEOUS at 16:43

## 2025-07-17 NOTE — PROGRESS NOTES
Name: Haja Shen      : 1965      MRN: [id number]  Encounter Provider: Leeanna Vickers DO  Encounter Date: 2025   Encounter department: [facility]  :  Assessment & Plan  Annual physical exam  - History and physical examination done  - Pt was counseled to eat a heart healthy diet, to drink at least 2 L of water daily, to take a daily multivitamin and to exercise for at least 30 minutes of cardio exercise daily, for at least 5 days a week.  - CBC, CMP, TSH and lipid panel have been ordered and we will follow-up with the results.  - He is up-to-date with his Td  - He is not up-to-date with his colon cancer screen and has the Cologuard and was urged to get it done  - Patient will receive his monthly B12 shot today as requested  - follow up in 6 months with PCP.    Orders:  •  CBC and differential; Future  •  TSH, 3rd generation with Free T4 reflex; Future  •  Comprehensive metabolic panel; Future  •  Lipid panel; Future    Vitamin B12 deficiency  -Patient will receive his monthly B12 shot today             Depression Screening and Follow-up Plan: Patient was screened for depression during today's encounter. They screened negative with a PHQ-2 score of 0.        History of Present Illness   HPI    Patient presents for an annual physical exam.    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, wisdom tooth extraction     Medications-see list    Allergies-see list    Diet- mixture of balanced and junk food , does not drink much water     Exercise- no daily exercise     Alcohol use- 4 drinks a week     Caffeine and soda use- a pot of coffee daily     Nicotine use- quit smoking about 10 years ago and smoked for 30 years for a max of less  than one pack a day     Recreational drug use-none     Work-[employer]    Sexual history, STD history and HIV testing- not sexually active and has never had an std    Gynecological history/Prostate health/testicular health history-no luts     Colonoscopy-never and has the cologuard at home and will do it     Immunization history- td - 2020    Dental visit- has not gone in 10 years    Vision- glasses for driving    Family history-  DM- mom  Parkinsons ds - dad  Hypothyroidism- sis   MI - PGF  Htn - bro  Ca ? Site - [facility] [place]    Today, patient would like his monthly b 12 shot  He admits to occasional arthralgias in his knee but denies any  fever, chills, night sweats, headache, dizziness, nasal congestion, runny nose, pnd, sore throat, ear ache, sinus pain or pressure, wheezing, cough, chest pain, sob, palpitations, nausea, vomiting, diarrhea, constipation, hematochezia, hematuria, melena stools, abdominal pain, myalgias, feelings of anxiety, depression or insomnia.      Review of Systems   Constitutional:  Negative for activity change, chills, fatigue, fever and unexpected weight change.   HENT:  Negative for ear pain, postnasal drip, rhinorrhea, sinus pressure and sore throat.    Eyes:  Negative for pain.   Respiratory:  Negative for cough, choking, chest tightness, shortness of breath and wheezing.    Cardiovascular:  Negative for chest pain, palpitations and leg swelling.   Gastrointestinal:  Negative for abdominal pain, constipation, diarrhea, nausea and vomiting.   Genitourinary:  Negative for dysuria and hematuria.   Musculoskeletal:  Positive for arthralgias (knee). Negative for back pain, gait problem, joint swelling, myalgias and neck stiffness.   Skin:  Negative for pallor and rash.   Neurological:  Negative for dizziness, tremors, seizures, syncope, light-headedness and headaches.   Hematological:  Negative for adenopathy.   Psychiatric/Behavioral:  Negative for behavioral problems, dysphoric mood  and sleep disturbance. The patient is not nervous/anxious.        Objective   /80 (BP Location: Left arm, Patient Position: Sitting, Cuff Size: Large)   Pulse 67   Temp 98.1 °F (36.7 °C)   Ht 6' (1.829 m)   Wt (!) 136 kg (300 lb 3.2 oz)   SpO2 98%   BMI 40.71 kg/m²      Physical Exam  Constitutional:       General: He is not in acute distress.     Appearance: He is well-developed. He is obese. He is not diaphoretic.      Comments: BMI is 40.71   HENT:      Head: Normocephalic and atraumatic.      Right Ear: External ear normal.      Left Ear: External ear normal.      Nose: Nose normal.      Mouth/Throat:      Mouth: Mucous membranes are dry.      Pharynx: Posterior oropharyngeal erythema present. No oropharyngeal exudate.     Eyes:      General: No scleral icterus.        Right eye: No discharge.         Left eye: No discharge.      Conjunctiva/sclera: Conjunctivae normal.      Pupils: Pupils are equal, round, and reactive to light.     Neck:      Thyroid: No thyromegaly.      Vascular: No JVD.      Trachea: No tracheal deviation.     Cardiovascular:      Rate and Rhythm: Normal rate and regular rhythm.      Heart sounds: Heart sounds are distant. No murmur heard.     No friction rub. No gallop.   Pulmonary:      Effort: Pulmonary effort is normal. No respiratory distress.      Breath sounds: Examination of the right-upper field reveals wheezing. Examination of the left-upper field reveals wheezing. Examination of the right-middle field reveals wheezing. Examination of the left-middle field reveals wheezing. Decreased breath sounds and wheezing present. No rales.   Chest:      Chest wall: No tenderness.   Abdominal:      General: Bowel sounds are normal. There is no distension.      Palpations: Abdomen is soft. There is no mass.      Tenderness: There is no abdominal tenderness. There is no guarding or rebound.     Musculoskeletal:         General: No tenderness or deformity. Normal range of motion.       Cervical back: Normal range of motion and neck supple.      Right lower leg: Pitting Edema (trace pitting pedal edema in the lower legs - has compression stockings on) present.   Lymphadenopathy:      Cervical: No cervical adenopathy.     Skin:     General: Skin is warm and dry.      Coloration: Skin is not pale.      Findings: No erythema or rash.     Neurological:      Mental Status: He is alert and oriented to person, place, and time.      Cranial Nerves: No cranial nerve deficit.      Motor: No abnormal muscle tone.      Coordination: Coordination normal.      Deep Tendon Reflexes: Reflexes are normal and symmetric.     Psychiatric:         Behavior: Behavior normal.

## 2025-07-17 NOTE — PROGRESS NOTES
Adult Annual Physical  Name: Haja Shen      : 1965      MRN: [id number]  Encounter Provider: Leeanna Vickers DO  Encounter Date: 2025   Encounter department: [facility]    :  Assessment & Plan  Annual physical exam    Orders:  •  CBC and differential; Future  •  TSH, 3rd generation with Free T4 reflex; Future  •  Comprehensive metabolic panel; Future  •  Lipid panel; Future    Vitamin B12 deficiency  -Patient will receive his monthly B12 shot today               Immunizations:  - Immunizations due: Prevnar 20, Tdap and Zoster (Shingrix)         History of Present Illness     Adult Annual Physical:  Patient presents for annual physical.     Diet and Physical Activity:  - Diet/Nutrition: no special diet and limited junk food.  - Exercise: walking, moderate cardiovascular exercise, 5-7 times a week on average and 30-60 minutes on average. walking with work and swining    Depression Screening:  - PHQ-2 Score: 0    General Health:  - Sleep: sleeps poorly, 4-6 hours of sleep on average and daytime hypersomnolence.  - Hearing: normal hearing bilateral ears.  - Vision: no vision problems, most recent eye exam > 1 year ago and wears glasses.  - Dental: no dental visits for > 1 year, brushes teeth once daily and does not floss.     Health:  - History of STDs: no.   - Urinary symptoms: none.     Advanced Care Planning:  - Has an advanced directive?: no    - Has a durable medical POA?: no      Review of Systems      Objective   /80 (BP Location: Left arm, Patient Position: Sitting, Cuff Size: Large)   Pulse 67   Temp 98.1 °F (36.7 °C)   Ht 6' (1.829 m)   Wt (!) 136 kg (300 lb 3.2 oz)   SpO2 98%   BMI 40.71 kg/m²     Physical Exam

## 2025-07-17 NOTE — PATIENT INSTRUCTIONS
"Patient Education     Routine physical for adults   The Basics   Written by the doctors and editors at [facility]   What is a physical? -- A physical is a routine visit, or \"check-up,\" with your doctor. You might also hear it called a \"wellness visit\" or \"preventive visit.\"  During each visit, the doctor will:   Ask about your physical and mental health   Ask about your habits, behaviors, and lifestyle   Do an exam   Give you vaccines if needed   Talk to you about any medicines you take   Give advice about your health   Answer your questions  Getting regular check-ups is an important part of taking care of your health. It can help your doctor find and treat any problems you have. But it's also important for preventing health problems.  A routine physical is different from a \"sick visit.\" A sick visit is when you see a doctor because of a health concern or problem. Since physicals are scheduled ahead of time, you can think about what you want to ask the doctor.  How often should I get a physical? -- It depends on your age and health. In general, for people age 21 years and older:   If you are younger than 50 years, you might be able to get a physical every 3 years.   If you are 50 years or older, your doctor might recommend a physical every year.  If you have an ongoing health condition, like diabetes or high blood pressure, your doctor will probably want to see you more often.  What happens during a physical? -- In general, each visit will include:   Physical exam - The doctor or nurse will check your height, weight, heart rate, and blood pressure. They will also look at your eyes and ears. They will ask about how you are feeling and whether you have any symptoms that bother you.   Medicines - It's a good idea to bring a list of all the medicines you take to each doctor visit. Your doctor will talk to you about your medicines and answer any questions. Tell them if you are having any side effects that bother you. You " "should also tell them if you are having trouble paying for any of your medicines.   Habits and behaviors - This includes:   Your diet   Your exercise habits   Whether you smoke, drink alcohol, or use drugs   Whether you are sexually active   Whether you feel safe at home  Your doctor will talk to you about things you can do to improve your health and lower your risk of health problems. They will also offer help and support. For example, if you want to quit smoking, they can give you advice and might prescribe medicines. If you want to improve your diet or get more physical activity, they can help you with this, too.   Lab tests, if needed - The tests you get will depend on your age and situation. For example, your doctor might want to check your:   Cholesterol   Blood sugar   Iron level   Vaccines - The recommended vaccines will depend on your age, health, and what vaccines you already had. Vaccines are very important because they can prevent certain serious or deadly infections.   Discussion of screening - \"Screening\" means checking for diseases or other health problems before they cause symptoms. Your doctor can recommend screening based on your age, risk, and preferences. This might include tests to check for:   Cancer, such as breast, prostate, cervical, ovarian, colorectal, prostate, lung, or skin cancer   Sexually transmitted infections, such as chlamydia and gonorrhea   Mental health conditions like depression and anxiety  Your doctor will talk to you about the different types of screening tests. They can help you decide which screenings to have. They can also explain what the results might mean.   Answering questions - The physical is a good time to ask the doctor or nurse questions about your health. If needed, they can refer you to other doctors or specialists, too.  Adults older than 65 years often need other care, too. As you get older, your doctor will talk to you about:   How to prevent falling at " home   Hearing or vision tests   Memory testing   How to take your medicines safely   Making sure that you have the help and support you need at home  All topics are updated as new evidence becomes available and our peer review process is complete.  This topic retrieved from "[employer]" on: May 02, 2024.  Topic 374764 Version 1.0  Release: 32.4.3 - C32.122  © 2024 UpToDate, Inc. and/or its affiliates. All rights reserved.  Consumer Information Use and Disclaimer   Disclaimer: This generalized information is a limited summary of diagnosis, treatment, and/or medication information. It is not meant to be comprehensive and should be used as a tool to help the user understand and/or assess potential diagnostic and treatment options. It does NOT include all information about conditions, treatments, medications, side effects, or risks that may apply to a specific patient. It is not intended to be medical advice or a substitute for the medical advice, diagnosis, or treatment of a health care provider based on the health care provider's examination and assessment of a patient's specific and unique circumstances. Patients must speak with a health care provider for complete information about their health, medical questions, and treatment options, including any risks or benefits regarding use of medications. This information does not endorse any treatments or medications as safe, effective, or approved for treating a specific patient. UpToDate, Inc. and its affiliates disclaim any warranty or liability relating to this information or the use thereof.The use of this information is governed by the Terms of Use, available at https://www.[employer].com/en/know/clinical-effectiveness-terms. 2024© UpToDate, Inc. and its affiliates and/or licensors. All rights reserved.  Copyright   © 2024 UpToDate, Inc. and/or its affiliates. All rights reserved.

## 2025-08-20 ENCOUNTER — CLINICAL SUPPORT (OUTPATIENT)
Dept: INTERNAL MEDICINE CLINIC | Age: 60
End: 2025-08-20
Payer: COMMERCIAL

## 2025-08-20 DIAGNOSIS — E53.8 VITAMIN B12 DEFICIENCY: Primary | ICD-10-CM

## 2025-08-20 PROCEDURE — 96372 THER/PROPH/DIAG INJ SC/IM: CPT

## 2025-08-20 RX ADMIN — CYANOCOBALAMIN 1000 MCG: 1000 INJECTION, SOLUTION INTRAMUSCULAR; SUBCUTANEOUS at 15:57

## 2025-09-12 NOTE — PROGRESS NOTES
Therapy Activity Session  Performed by Rehab Aide staff     TEP: AcuteTherapy Extention Program, activity plan was established by a licensed therapist and performed under the guidance of a licensed therapist.      Pt seen on 10 nursing unit                                                                                         PT Frequency Frequency Comments: X WF 2/1-2 by 9/17 (TEP daily re-assess 9/17) DOVETAIL/AIDE                                                                                  OT Frequency Frequency Comments: XT W F 3/3-5 by 9/16    SLP Frequency      Availability:  Patient available and per RN report, able to particiate.     Tolerance/Participation  Tolerated well, no shortness of breath or signs of discomfort., PROM completed, no active participation noted.    SESSION    Activities/Exercises/Mobility completed this session:  Physical Therapy Exercises    TEP Follow Up Needed: Yes  Therapy Extender Program Discipline: PT     PT Session Length (min): 10 minutes (09/12/25 1547)  PT Frequency: TEP daily, reassess on 9/17; PT 1-2x/wk FRANCHESCA GARG [phone]    PT Task 1: LE PROM - supine hip flexion  PT Reps for Task 1: 10  PT Sets for Task 1: 2  PT Resistance for Task 1: AAROM LLE, PROM RLE  PT Task 1 Completion: Completed (09/12/25 1547)    PT Task 2: LE PROM - supine hip abduction/adduction  PT Reps for Task 2: 10  PT Sets for Task 2: 2  PT Resistance for Task 2: AAROM LLE, PROM RLE  PT Task 2 Completion: Completed (09/12/25 1547)    PT Task 3: LE PROM - supine knee flexion/extension  PT Reps for Task 3: 10  PT Sets for Task 3: 2  PT Resistance for Task 3: AAROM LLE, PROM RLE  PT Task 3 Completion: Completed (09/12/25 1547)       PT Task 4: LE PROM - supine ankle dorsiflexion/plantar flexion  PT Reps for Task 4: 10  PT Resistance for Task 4: AAROM LLE, PROM RLE  PT Task 4 Completion: Completed (09/12/25 1547)    Occupational Therapy Exercises    TEP Follow Up Needed: Yes  Therapy Extender Program  Name: Haja Shen      : 1965      MRN: [id number]  Encounter Provider: Dilip Botello PA-C  Encounter Date: 2025   Encounter department: [facility]  :  Assessment & Plan  Cellulitis of right leg  Recent admission for sepsis 2/2 cellulitis. Uncertain if this represents a recurrent cellulitis versus persistent symptoms due to suboptimal treatment courses.  Reported right medial thigh erythema, warmth, swelling and tenderness with associated fevers since early 2025.  Minimal improvement with Keflex 500 mg 4 times daily x 7 days.  PCP concerned for Lyme despite no known tick exposures and treated with 21-day course of p.o. doxycycline with some improvement.  Initial Lyme total antibody positive with negative reflex.  Subsequent testing negative.  Overall suspicion for Lyme is low without any obvious exposure and negative testing.  Appearance and distribution of erythema/cellulitis appeared streptococcal.  He has chronic lower extremity venous stasis and likely underlying lymphedema which will certainly put him at risk for recurrent streptococcal cellulitis.  He completed treatment course with PO Keflex. Sx resolved. Some thigh swelling persists but overall Improved.   No additional abx required   Follow up PRN   Encourage lower extremity elevation, compression, weight loss  Venous insufficiency of both lower extremities  Suspected venous insufficiency of bilateral lower extremities with evidence of chronic nonpitting edema, venous stasis changes to the skin with dermatitis and varicosities on exam.   Obesity, Class III, BMI 40-49.9 (morbid obesity)  BMI 40.63. Discussed weight loss, lifestyle modifications with patient. Recommended follow-up with PCP for consideration of weight loss drugs or referral to weight management.     Above assessment and plan discussed in detail with patient and wife during examination.       Antibiotics:  None  "    Subjective:  Patient presents to outpatient ID office for ongoing surveillance of recent RLE cellulitis. Sx resolved, some mild residual swelling of right medial thigh just above the knee. Compression stockings come up to a few inches below the knee which might be contributing to proximal swelling. Otherwise erythema, rash, warmth, etc. Resolved. He is off the keflex and completed course of 1000mg qid. No pain or new wounds. No fevers or chills. Elevates legs after work.     Objective:    Vitals:   Vitals:    05/21/25 1502   BP: 157/76   Pulse: 63   Temp: 98.3 °F (36.8 °C)   SpO2: 96%       Physical Exam:     General Appearance:  Alert, interactive, nontoxic, no acute distress.   HEENT: Oropharynx moist without lesions.    Lungs:   On room air, respirations unlabored   Heart:  RRR; no murmur   Abdomen:   Soft, non-tender, non-distended, obese    Extremities: Chronic b/l LE lymphedema    Skin: No new rashes or lesions. R medial thigh swelling without warmth or SOI.        Labs, Imaging, & Other studies:   All pertinent labs and imaging studies were personally reviewed by me from 5/8.              Invalid input(s): \"ALBUMIN\"                    The following portions of the patient's history were reviewed and updated as appropriate: allergies, current medications, past family history, past medical history, past social history, past surgical history and problem list.                    " Discipline: PT                                                                   Speech Therapy Exercises    TEP Follow Up Needed: Yes